# Patient Record
Sex: FEMALE | Race: WHITE | NOT HISPANIC OR LATINO | Employment: OTHER | URBAN - METROPOLITAN AREA
[De-identification: names, ages, dates, MRNs, and addresses within clinical notes are randomized per-mention and may not be internally consistent; named-entity substitution may affect disease eponyms.]

---

## 2017-01-03 ENCOUNTER — OFFICE VISIT (OUTPATIENT)
Dept: FAMILY MEDICINE | Facility: CLINIC | Age: 57
End: 2017-01-03
Payer: COMMERCIAL

## 2017-01-03 VITALS
HEIGHT: 61 IN | DIASTOLIC BLOOD PRESSURE: 78 MMHG | HEART RATE: 88 BPM | SYSTOLIC BLOOD PRESSURE: 128 MMHG | WEIGHT: 134 LBS | TEMPERATURE: 98.5 F | BODY MASS INDEX: 25.3 KG/M2

## 2017-01-03 DIAGNOSIS — Z11.59 NEED FOR HEPATITIS C SCREENING TEST: ICD-10-CM

## 2017-01-03 DIAGNOSIS — F32.5 MAJOR DEPRESSION IN COMPLETE REMISSION (H): Primary | ICD-10-CM

## 2017-01-03 DIAGNOSIS — Z23 NEED FOR PROPHYLACTIC VACCINATION AND INOCULATION AGAINST INFLUENZA: ICD-10-CM

## 2017-01-03 PROCEDURE — 99213 OFFICE O/P EST LOW 20 MIN: CPT | Performed by: FAMILY MEDICINE

## 2017-01-03 RX ORDER — BUPROPION HYDROCHLORIDE 300 MG/1
300 TABLET ORAL EVERY MORNING
Qty: 90 TABLET | Refills: 3 | Status: SHIPPED | OUTPATIENT
Start: 2017-01-03 | End: 2017-04-06

## 2017-01-03 ASSESSMENT — ANXIETY QUESTIONNAIRES
2. NOT BEING ABLE TO STOP OR CONTROL WORRYING: NOT AT ALL
5. BEING SO RESTLESS THAT IT IS HARD TO SIT STILL: NOT AT ALL
1. FEELING NERVOUS, ANXIOUS, OR ON EDGE: SEVERAL DAYS
7. FEELING AFRAID AS IF SOMETHING AWFUL MIGHT HAPPEN: NOT AT ALL
3. WORRYING TOO MUCH ABOUT DIFFERENT THINGS: SEVERAL DAYS
6. BECOMING EASILY ANNOYED OR IRRITABLE: NOT AT ALL
GAD7 TOTAL SCORE: 2

## 2017-01-03 ASSESSMENT — PATIENT HEALTH QUESTIONNAIRE - PHQ9: 5. POOR APPETITE OR OVEREATING: NOT AT ALL

## 2017-01-03 NOTE — MR AVS SNAPSHOT
After Visit Summary   1/3/2017    Lyn Santana    MRN: [id number]           Patient Information     Date Of Birth          1960        Visit Information        Provider Department      1/3/2017 1:00 PM Ez Corbett MD [facility]        Today's Diagnoses     Major depression in complete remission (H)    -  1     Need for hepatitis C screening test         Need for prophylactic vaccination and inoculation against influenza           Care Instructions    Orders Placed This Encounter     buPROPion (WELLBUTRIN XL) 300 MG 24 hr tablet     Sig: Take 1 tablet (300 mg) by mouth every morning     Dispense:  90 tablet     Refill:  3             Follow-ups after your visit        Who to contact     If you have questions or need follow up information about today's clinic visit or your schedule please contact [facility] directly at [phone].  Normal or non-critical lab and imaging results will be communicated to you by [facility], letter or phone within 4 business days after the clinic has received the results. If you do not hear from us within 7 days, please contact the clinic through [facility] [employer] or phone. If you have a critical or abnormal lab result, we will notify you by phone as soon as possible.  Submit refill requests through [employer] or call your pharmacy and they will forward the refill request to us. Please allow 3 business days for your refill to be completed.          Additional Information About Your Visit        MyChart Information     [employer] gives you secure access to your electronic health record. If you see a primary care provider, you can also send messages to your care team and make appointments. If you have questions, please call your primary care clinic.  If you do not have a primary care provider, please call [phone] and they will assist you.        Care EveryWhere ID     This is your Care EveryWhere ID. This could be used by other  "organizations to access your [facility] medical records  [id number]        Your Vitals Were     Pulse Temperature Height BMI (Body Mass Index)          88 98.5  F (36.9  C) (Oral) 5' 0.5\" (1.537 m) 25.73 kg/m2         Blood Pressure from Last 3 Encounters:   01/03/17 128/78   01/13/16 112/68   01/12/15 112/72    Weight from Last 3 Encounters:   01/03/17 134 lb (60.782 kg)   01/13/16 133 lb 8 oz (60.555 kg)   01/12/15 103 lb 12.8 oz (47.083 kg)              We Performed the Following     DEPRESSION ACTION PLAN (DAP) Order [[id number]]          Where to get your medicines      These medications were sent to [id number] - [place], [place] - [address] AT [facility] [address]  [address], [place] [address]     Phone:  [phone]    - buPROPion 300 MG 24 hr tablet       Primary Care Provider Office Phone # Fax #    Ez Corbett [phone]       [address] [address]        Thank you!     Thank you for choosing [facility]  for your care. Our goal is always to provide you with excellent care. Hearing back from our patients is one way we can continue to improve our services. Please take a few minutes to complete the written survey that you may receive in the mail after your visit with us. Thank you!             Your Updated Medication List - Protect others around you: Learn how to safely use, store and throw away your medicines at www.disposemymeds.org.          This list is accurate as of: 1/3/17  1:16 PM.  Always use your most recent med list.                   Brand Name Dispense Instructions for use    buPROPion 300 MG 24 hr tablet    WELLBUTRIN XL    90 tablet    Take 1 tablet (300 mg) by mouth every morning       CALCIUM + D PO      Take by mouth daily       MULTIVITAMINS PO      Take by mouth daily       vitamin D 2000 UNITS tablet     100 tablet    Take 2,000 Units by " mouth 2 times daily

## 2017-01-03 NOTE — PATIENT INSTRUCTIONS
Orders Placed This Encounter     buPROPion (WELLBUTRIN XL) 300 MG 24 hr tablet     Sig: Take 1 tablet (300 mg) by mouth every morning     Dispense:  90 tablet     Refill:  3

## 2017-01-03 NOTE — PROGRESS NOTES
SUBJECTIVE:                                                    Lyn Santana is a 56 year old female who presents to clinic today for the following health issues:    Patient reports eustress and distress secondary to grandson moving in, but otherwise doing well. Tolerating medications with no side effects. PHQ-9 was 2 and LEONOR-7 was 0.     Hep C screening recommendations and risk factors discussed with patient.     Mammogram on 8/1/2016 showed scattered fibroglandular densities. Recommend yearly mammograms.     Reviewed records from Colonoscopy in 2008. Normal findings and recommend repeat in 2018.     Past/recent records reviewed and discussed for -- immunizations (flu and shingles vaccine) and medications.        Depression and Anxiety Follow-Up    Status since last visit: No change    Other associated symptoms:None    Complicating factors:     Significant life event: Yes-  Son and grandson moved in     Current substance abuse: None    PHQ-9 SCORE 12/23/2014 2/19/2015 1/13/2016   Total Score 13 6 -   Total Score - - 0     LEONOR-7 SCORE 6/17/2014 10/24/2014 1/13/2016   Total Score 9 11 -   Total Score - - 0        PHQ-9  English      PHQ-9   Any Language     GAD7       Amount of exercise or physical activity: 3 days a week    Problems taking medications regularly: No    Medication side effects: none    Diet: regular (no restrictions)        Problem list and histories reviewed & adjusted, as indicated.  Additional history: as documented    Problem list, Medication list, Allergies, and Medical/Social/Surgical histories reviewed in EPIC and updated as appropriate.    ROS:  Constitutional, HEENT, cardiovascular, pulmonary, GI, , musculoskeletal, neuro, skin, endocrine and psych systems are negative, except as otherwise noted.    This document serves as a record of the services and decisions personally performed and made by Taqueria Corbett MD. It was created on their behalf by Leonid Zapata, a trained medical scribe. The  "creation of this document is based the provider's statements to the medical scribe.  Leonid Zapata January 3, 2017 1:02 PM         OBJECTIVE:                                                    /78 mmHg  Pulse 88  Temp(Src) 98.5  F (36.9  C) (Oral)  Ht 1.537 m (5' 0.5\")  Wt 60.782 kg (134 lb)  BMI 25.73 kg/m2  Body mass index is 25.73 kg/(m^2).  GENERAL: healthy, alert and no distress  HENT: ear canals and TM's normal, nose and mouth without ulcers or lesions  RESP: lungs clear to auscultation - no rales, rhonchi or wheezes  CV: regular rate and rhythm, normal S1 S2, no S3 or S4, no murmur, click or rub, no peripheral edema  PSYCH: mentation appears normal, affect normal/bright    Diagnostic Test Results:  none      ASSESSMENT/PLAN:                                                    (F32.5) Major depression in complete remission (H)  (primary encounter diagnosis)  Comment: stable  Plan: buPROPion (WELLBUTRIN XL) 300 MG 24 hr tablet        Continue present medications, medications refilled    (Z11.59) Need for hepatitis C screening test  Comment: risk factors reviewed, patient at low risk  Plan:     (Z23) Need for prophylactic vaccination and inoculation against influenza  Comment: patient given flu vaccine 8/2016  Plan:     Patient Instructions     Orders Placed This Encounter     buPROPion (WELLBUTRIN XL) 300 MG 24 hr tablet     Sig: Take 1 tablet (300 mg) by mouth every morning     Dispense:  90 tablet     Refill:  3         Ez Corbett MD  [facility]    "

## 2017-01-03 NOTE — NURSING NOTE
"Chief Complaint   Patient presents with     Depression       Initial /78 mmHg  Pulse 88  Temp(Src) 98.5  F (36.9  C) (Oral)  Ht 5' 0.5\" (1.537 m)  Wt 134 lb (60.782 kg)  BMI 25.73 kg/m2 Estimated body mass index is 25.73 kg/(m^2) as calculated from the following:    Height as of this encounter: 5' 0.5\" (1.537 m).    Weight as of this encounter: 134 lb (60.782 kg).  BP completed using cuff size: regular    Brigette Carver MA     "

## 2017-01-04 ASSESSMENT — ANXIETY QUESTIONNAIRES: GAD7 TOTAL SCORE: 2

## 2017-01-04 ASSESSMENT — PATIENT HEALTH QUESTIONNAIRE - PHQ9: SUM OF ALL RESPONSES TO PHQ QUESTIONS 1-9: 2

## 2017-03-01 ENCOUNTER — TELEPHONE (OUTPATIENT)
Dept: FAMILY MEDICINE | Facility: CLINIC | Age: 57
End: 2017-03-01

## 2017-03-01 NOTE — TELEPHONE ENCOUNTER
Reason for call:  Patient reporting a symptom    Symptom or request: Patient has not been sleeping for several weeks and has been experiencing a lot of anxiety lately. Patient would like to speak with someone from Dr Corbett's team.    Duration (how long have symptoms been present): 4 weeks of sleeping troubles, anxiety is reoccurring    Have you been treated for this before? Dr Corbett is aware of anxiety issues    Additional comments: Patient uses [employer] [address] in [place]    Phone Number patient can be reached at:  Home number on file [phone] (home)    Best Time:  any    Can we leave a detailed message on this number:  YES    Call taken on 3/1/2017 at 9:03 AM by Leighann Multani

## 2017-03-01 NOTE — TELEPHONE ENCOUNTER
"\"Certainly not in top form\".     Lyn Santana is a 56 year old female who calls with anxiety, trouble sleeping.    NURSING ASSESSMENT:  Description:  See below. Patient states \"I'm certainly not in top form, I think my wellbutrin may have stopped working.\" She denies hallucinations, paranoia, confusion, suicidal thoughts, palpitations, inability to function, extreme anxiety, hyperventilation, profuse sweating, persistent upset stomach, light-headedness, drug/alcohol abuse, recent abrupt cessation of medication/drugs/alcohol/caffiene  Onset/duration:  4 weeks  Precip. factors:  Pre-existing anxiety  Associated symptoms:  insomnia  Improves/worsens symptoms:  n/a  Allergies: No Known Allergies    MEDICATIONS:   Taking medication(s) as prescribed? Yes  Taking over the counter medication(s?) No  Any medication side effects? Not Applicable    Any barriers to taking medication(s) as prescribed?  No  Medication(s) improving/managing symptoms?  No  Medication reconciliation completed: No      NURSING PLAN: Nursing advice to patient schedule appointment to discuss medication changes    RECOMMENDED DISPOSITION:  See in 24 hours - appointment made  Will comply with recommendation: Yes  If further questions/concerns or if symptoms do not improve, worsen or new symptoms develop, call your PCP or [facility] as soon as possible.      Guideline used:  Telephone Triage Protocols for Nurses, Fifth Edition, Stephanie Mcgarry \"anxiety\"    Chapo Wall RN      "

## 2017-03-02 ENCOUNTER — OFFICE VISIT (OUTPATIENT)
Dept: FAMILY MEDICINE | Facility: CLINIC | Age: 57
End: 2017-03-02
Payer: COMMERCIAL

## 2017-03-02 VITALS
TEMPERATURE: 97.6 F | DIASTOLIC BLOOD PRESSURE: 78 MMHG | SYSTOLIC BLOOD PRESSURE: 124 MMHG | HEART RATE: 64 BPM | HEIGHT: 61 IN | BODY MASS INDEX: 23.79 KG/M2 | WEIGHT: 126 LBS

## 2017-03-02 DIAGNOSIS — F51.01 PRIMARY INSOMNIA: ICD-10-CM

## 2017-03-02 DIAGNOSIS — F32.0 MAJOR DEPRESSIVE DISORDER, SINGLE EPISODE, MILD (H): Primary | ICD-10-CM

## 2017-03-02 PROCEDURE — 99214 OFFICE O/P EST MOD 30 MIN: CPT | Performed by: FAMILY MEDICINE

## 2017-03-02 RX ORDER — BUSPIRONE HYDROCHLORIDE 5 MG/1
TABLET ORAL
Qty: 120 TABLET | Refills: 3 | Status: SHIPPED
Start: 2017-03-02 | End: 2017-12-12

## 2017-03-02 RX ORDER — TRAZODONE HYDROCHLORIDE 50 MG/1
50 TABLET, FILM COATED ORAL
Qty: 60 TABLET | Refills: 5 | Status: SHIPPED | OUTPATIENT
Start: 2017-03-02 | End: 2018-01-26

## 2017-03-02 ASSESSMENT — ANXIETY QUESTIONNAIRES
3. WORRYING TOO MUCH ABOUT DIFFERENT THINGS: MORE THAN HALF THE DAYS
2. NOT BEING ABLE TO STOP OR CONTROL WORRYING: MORE THAN HALF THE DAYS
5. BEING SO RESTLESS THAT IT IS HARD TO SIT STILL: SEVERAL DAYS
7. FEELING AFRAID AS IF SOMETHING AWFUL MIGHT HAPPEN: NOT AT ALL
6. BECOMING EASILY ANNOYED OR IRRITABLE: NOT AT ALL
GAD7 TOTAL SCORE: 10
1. FEELING NERVOUS, ANXIOUS, OR ON EDGE: NEARLY EVERY DAY

## 2017-03-02 ASSESSMENT — PATIENT HEALTH QUESTIONNAIRE - PHQ9: 5. POOR APPETITE OR OVEREATING: MORE THAN HALF THE DAYS

## 2017-03-02 NOTE — Clinical Note
Tony  Review this patient with me. She would benefit from seeing you again. Hopefully she will call and set up an appt.  Taqueria Corbett MD

## 2017-03-02 NOTE — PROGRESS NOTES
SUBJECTIVE:                                                    Lyn Santana is a 56 year old female who presents to clinic today for the following health issues:    Anxiety. Mrs Santana reports increased anxiety secondary to her son and grandson recently moving-in. She states that changes in routine and responsibility has made it hard to find time for herself, and that it is hard for her to express her emotions as she tends to internalize her thoughts. Also, notes that she gets extremely anxious and has difficulty problem solving around things at work. Most bothersome symptoms include difficulty falling asleep at night and waking up early in the morning. Taking trazodone does seem to help provide some mental clarity. Discussed doing cognitive therapy and adding Buspar to her medications.     Subjective:  PHQ-9 score was 13 and LEONOR-7 score was 10      Past/recent records reviewed and discussed for -- medications            Depression and Anxiety Follow-Up    Status since last visit: Worsened secondary to situational stressors    Other associated symptoms:insomnia    Complicating factors:     Significant life event: No     Current substance abuse: None    PHQ-9 SCORE 2/19/2015 1/13/2016 1/3/2017   Total Score 6 - -   Total Score - 0 2     LEONOR-7 SCORE 10/24/2014 1/13/2016 1/3/2017   Total Score 11 - -   Total Score - 0 2        PHQ-9  English      PHQ-9   Any Language     GAD7       Amount of exercise or physical activity: usually 3 days a week    Problems taking medications regularly: No    Medication side effects: trouble sleeping  Diet: regular (no restrictions)        Problem list and histories reviewed & adjusted, as indicated.  Additional history: as documented    Labs reviewed in EPIC    Reviewed and updated as needed this visit by clinical staff       Reviewed and updated as needed this visit by Provider         ROS:  Constitutional, HEENT, cardiovascular, pulmonary, GI, , musculoskeletal, neuro, skin,  "endocrine and psych systems are negative, except as otherwise noted.    This document serves as a record of the services and decisions personally performed and made by Taqueria Corbett MD. It was created on their behalf by Leonid Zapata, a trained medical scribe. The creation of this document is based the provider's statements to the medical scribe.  Leonid Zapata March 2, 2017 5:37 PM         OBJECTIVE:                                                    /78 (BP Location: Right arm, Cuff Size: Adult Regular)  Pulse 64  Temp 97.6  F (36.4  C) (Oral)  Ht 1.537 m (5' 0.5\")  Wt 57.2 kg (126 lb)  BMI 24.2 kg/m2  Body mass index is 24.2 kg/(m^2).  GENERAL: healthy, alert and no distress  RESP: lungs clear to auscultation - no rales, rhonchi or wheezes  CV: regular rate and rhythm, normal S1 S2, no S3 or S4, no murmur, click or rub, no peripheral edema   PSYCH: mentation appears normal, flat affect    Diagnostic Test Results:  none      ASSESSMENT/PLAN:                                                    (F32.0) Major depressive disorder, single episode, mild (H)  (primary encounter diagnosis)  Comment: increased anxiety secondary to situational stressors.   Plan: busPIRone (BUSPAR) 5 MG tablet        Communicate with Tony and start on Buspar       Call patient in 1 week to reassess symptoms    (F51.01) Primary insomnia  Comment: Information provided to help improve her sleep hygiene   Plan: traZODone (DESYREL) 50 MG tablet           Patient Instructions   -avoid staying in bed when you're having difficulty sleeping at night.     Try valerian root, lavender oil, or tryptophan    -Create and maintain a routine    -Try to make time (30-60 minutes) for some light physical activity (example: brisk walk)     -keeping diary could help you avoid internalizing your thoughts and feelings.     -you can take up to two tablets of trazdone at bedtime     Tony Lucas [phone]- [place] [facility]    General recommendations for sleep " problems (Insomnia)  Allow 2-4 weeks to see results   Establish a regular sleep schedule   Go to bed at same time each night   Get up at same time each day   Avoid sleeping-in on Sunday morning   Cut down time in bed (if not asleep, get up)   Avoid trying to force yourself to sleep   Use your bed only for sleep and sex   Do not read or watch Television in bed   Make the bedroom comfortable   Keep temperature in your bedroom comfortable   Keep bedroom quiet when sleeping   Consider ear plugs (silicon)   Keep bedroom dark enough   Use dark blinds or wear an eye mask if needed   Relax at bedtime   Relax each muscle group individually   Begin with your feet   Work toward your head   Deal with your worries before bedtime   Set aside a worry time for 30 minutes earlier   Listen to relaxation tapes   Classical Music or Nature sounds   Imagine a tranquil scene (e.g. waterfall or beach)   Back Massage   Gentle 5-minute back rub prior to bedtime   Perform measures to make you tired at bedtime   Get regular Exercise each day (6 hours before bedtime)   Take medications only as directed   Eat a light bedtime snack or warm drink   Warm milk   Warm herbal tea (non-caffeinated)   Special Therapy Measures   Sleep Restriction Therapy   Limit time in bed for 15 minutes more than sleep   Do not set limit under 4 hours   Increase time by 15 minutes per effective sleep trial   Effective sleep suggests >90% of bedtime asleep   Stimulus Control   Do not lie awake for more than 30 minutes   Get out of bed and perform a quiet activity   Return to bed when sleepy   Repeat as many times per night as needed   No Napping during day   Things to avoid   Do not Exercise just before bedtime   No overstimulating activities just before bed   No competitive games before bedtime   No exciting Television programs before bedtime   Avoid caffeine after lunchtime   Avoid chocolate   Avoid coffee, tea, or soda   Do not use alcohol to induce sleep (worsens  Insomnia)   Do not take someone else's sleeping pills   Do not look at the clock when awakening   Do not turn on light when getting up to use bathroom   Read the book Say God Night To Insomnia            Ez Corbett MD  [facility]

## 2017-03-02 NOTE — NURSING NOTE
"Chief Complaint   Patient presents with     Anxiety       Initial /78 (BP Location: Right arm, Cuff Size: Adult Regular)  Pulse 64  Temp 97.6  F (36.4  C) (Oral)  Ht 5' 0.5\" (1.537 m)  Wt 126 lb (57.2 kg)  BMI 24.2 kg/m2 Estimated body mass index is 24.2 kg/(m^2) as calculated from the following:    Height as of this encounter: 5' 0.5\" (1.537 m).    Weight as of this encounter: 126 lb (57.2 kg).  Medication Reconciliation: complete     Brigette Carver MA     "

## 2017-03-02 NOTE — PATIENT INSTRUCTIONS
-avoid staying in bed when you're having difficulty sleeping at night.     Try valerian root, lavender oil, or tryptophan    -Create and maintain a routine    -Try to make time (30-60 minutes) for some light physical activity (example: brisk walk)     -keeping diary could help you avoid internalizing your thoughts and feelings.     -you can take up to two tablets of trazdone at bedtime     Tony Lucas [phone]- [facility]    General recommendations for sleep problems (Insomnia)  Allow 2-4 weeks to see results   Establish a regular sleep schedule   Go to bed at same time each night   Get up at same time each day   Avoid sleeping-in on Sunday morning   Cut down time in bed (if not asleep, get up)   Avoid trying to force yourself to sleep   Use your bed only for sleep and sex   Do not read or watch Television in bed   Make the bedroom comfortable   Keep temperature in your bedroom comfortable   Keep bedroom quiet when sleeping   Consider ear plugs (silicon)   Keep bedroom dark enough   Use dark blinds or wear an eye mask if needed   Relax at bedtime   Relax each muscle group individually   Begin with your feet   Work toward your head   Deal with your worries before bedtime   Set aside a worry time for 30 minutes earlier   Listen to relaxation tapes   Classical Music or Nature sounds   Imagine a tranquil scene (e.g. waterfall or beach)   Back Massage   Gentle 5-minute back rub prior to bedtime   Perform measures to make you tired at bedtime   Get regular Exercise each day (6 hours before bedtime)   Take medications only as directed   Eat a light bedtime snack or warm drink   Warm milk   Warm herbal tea (non-caffeinated)   Special Therapy Measures   Sleep Restriction Therapy   Limit time in bed for 15 minutes more than sleep   Do not set limit under 4 hours   Increase time by 15 minutes per effective sleep trial   Effective sleep suggests >90% of bedtime asleep   Stimulus Control   Do not lie awake for  more than 30 minutes   Get out of bed and perform a quiet activity   Return to bed when sleepy   Repeat as many times per night as needed   No Napping during day   Things to avoid   Do not Exercise just before bedtime   No overstimulating activities just before bed   No competitive games before bedtime   No exciting Television programs before bedtime   Avoid caffeine after lunchtime   Avoid chocolate   Avoid coffee, tea, or soda   Do not use alcohol to induce sleep (worsens Insomnia)   Do not take someone else's sleeping pills   Do not look at the clock when awakening   Do not turn on light when getting up to use bathroom   Read the book Say God Night To Insomnia

## 2017-03-02 NOTE — MR AVS SNAPSHOT
After Visit Summary   3/2/2017    Lyn Santana    MRN: [id number]           Patient Information     Date Of Birth          1960        Visit Information        Provider Department      3/2/2017 5:40 PM Ez Corbett MD [facility]        Today's Diagnoses     Major depressive disorder, single episode, mild (H)    -  1    Primary insomnia          Care Instructions    -avoid staying in bed when you're having difficulty sleeping at night.     Try valerian root, lavender oil, or tryptophan    -Create and maintain a routine    -Try to make time (30-60 minutes) for some light physical activity (example: brisk walk)     -keeping diary could help you avoid internalizing your thoughts and feelings.     -you can take up to two tablets of trazdone at bedtime     Tony Lucas [phone]- [place] Scheduling    General recommendations for sleep problems (Insomnia)  Allow 2-4 weeks to see results   Establish a regular sleep schedule   Go to bed at same time each night   Get up at same time each day   Avoid sleeping-in on Sunday morning   Cut down time in bed (if not asleep, get up)   Avoid trying to force yourself to sleep   Use your bed only for sleep and sex   Do not read or watch Television in bed   Make the bedroom comfortable   Keep temperature in your bedroom comfortable   Keep bedroom quiet when sleeping   Consider ear plugs (silicon)   Keep bedroom dark enough   Use dark blinds or wear an eye mask if needed   Relax at bedtime   Relax each muscle group individually   Begin with your feet   Work toward your head   Deal with your worries before bedtime   Set aside a worry time for 30 minutes earlier   Listen to relaxation tapes   Classical Music or Nature sounds   Imagine a tranquil scene (e.g. waterfall or beach)   Back Massage   Gentle 5-minute back rub prior to bedtime   Perform measures to make you tired at bedtime   Get regular Exercise each day (6 hours before  bedtime)   Take medications only as directed   Eat a light bedtime snack or warm drink   Warm milk   Warm herbal tea (non-caffeinated)   Special Therapy Measures   Sleep Restriction Therapy   Limit time in bed for 15 minutes more than sleep   Do not set limit under 4 hours   Increase time by 15 minutes per effective sleep trial   Effective sleep suggests >90% of bedtime asleep   Stimulus Control   Do not lie awake for more than 30 minutes   Get out of bed and perform a quiet activity   Return to bed when sleepy   Repeat as many times per night as needed   No Napping during day   Things to avoid   Do not Exercise just before bedtime   No overstimulating activities just before bed   No competitive games before bedtime   No exciting Television programs before bedtime   Avoid caffeine after lunchtime   Avoid chocolate   Avoid coffee, tea, or soda   Do not use alcohol to induce sleep (worsens Insomnia)   Do not take someone else's sleeping pills   Do not look at the clock when awakening   Do not turn on light when getting up to use bathroom   Read the book Say God Night To Insomnia                  Follow-ups after your visit        Who to contact     If you have questions or need follow up information about today's clinic visit or your schedule please contact [facility] directly at [phone].  Normal or non-critical lab and imaging results will be communicated to you by MyChart, letter or phone within 4 business days after the clinic has received the results. If you do not hear from us within 7 days, please contact the clinic through "[employer]hart or phone. If you have a critical or abnormal lab result, we will notify you by phone as soon as possible.  Submit refill requests through [employer] or call your pharmacy and they will forward the refill request to us. Please allow 3 business days for your refill to be completed.          Additional Information About Your Visit        MyChart Information      "[employer] gives you secure access to your electronic health record. If you see a primary care provider, you can also send messages to your care team and make appointments. If you have questions, please call your primary care clinic.  If you do not have a primary care provider, please call [phone] and they will assist you.        Care EveryWhere ID     This is your Care EveryWhere ID. This could be used by other organizations to access your [facility] medical records  [id number]        Your Vitals Were     Pulse Temperature Height BMI (Body Mass Index)          64 97.6  F (36.4  C) (Oral) 5' 0.5\" (1.537 m) 24.2 kg/m2         Blood Pressure from Last 3 Encounters:   03/02/17 124/78   01/03/17 128/78   01/13/16 112/68    Weight from Last 3 Encounters:   03/02/17 126 lb (57.2 kg)   01/03/17 134 lb (60.8 kg)   01/13/16 133 lb 8 oz (60.6 kg)              Today, you had the following     No orders found for display         Today's Medication Changes          These changes are accurate as of: 3/2/17  6:04 PM.  If you have any questions, ask your nurse or doctor.               Start taking these medicines.        Dose/Directions    busPIRone 5 MG tablet   Commonly known as:  BUSPAR   Used for:  Major depressive disorder, single episode, mild (H)   Started by:  Ez Corbett MD        Start at 5 mg twice daily for 3 days, , then 10 mg (2 tabs) twice daily for 3 days, then 12.5 mg (2.5 tabs) twice daily for 3 days, then 15 mg (3 tabs) twice daily and stay at that dose   Quantity:  120 tablet   Refills:  3         These medicines have changed or have updated prescriptions.        Dose/Directions    * TRAZODONE HCL PO   This may have changed:  Another medication with the same name was added. Make sure you understand how and when to take each.   Changed by:  Ez Corbett MD        Refills:  0       * traZODone 50 MG tablet   Commonly known as:  DESYREL   This may have changed:  You were already taking a " medication with the same name, and this prescription was added. Make sure you understand how and when to take each.   Used for:  Primary insomnia   Changed by:  Ez Corbett MD        Dose:  50 mg   Take 1 tablet (50 mg) by mouth nightly as needed for sleep   Quantity:  60 tablet   Refills:  5       * Notice:  This list has 2 medication(s) that are the same as other medications prescribed for you. Read the directions carefully, and ask your doctor or other care provider to review them with you.         Where to get your medicines      These medications were sent to [id number] - [place], [place] - [address] AT [facility] [address]  [address], [place] [address]     Phone:  [phone]     busPIRone 5 MG tablet    traZODone 50 MG tablet                Primary Care Provider Office Phone # Fax #    Ez Corbett [phone]       [address] [address]        Thank you!     Thank you for choosing [facility]  for your care. Our goal is always to provide you with excellent care. Hearing back from our patients is one way we can continue to improve our services. Please take a few minutes to complete the written survey that you may receive in the mail after your visit with us. Thank you!             Your Updated Medication List - Protect others around you: Learn how to safely use, store and throw away your medicines at www.disposemymeds.org.          This list is accurate as of: 3/2/17  6:04 PM.  Always use your most recent med list.                   Brand Name Dispense Instructions for use    buPROPion 300 MG 24 hr tablet    WELLBUTRIN XL    90 tablet    Take 1 tablet (300 mg) by mouth every morning       busPIRone 5 MG tablet    BUSPAR    120 tablet    Start at 5 mg twice daily for 3 days, , then 10 mg (2 tabs) twice daily for 3 days, then 12.5 mg (2.5 tabs) twice  daily for 3 days, then 15 mg (3 tabs) twice daily and stay at that dose       CALCIUM + D PO      Take by mouth daily       MULTIVITAMINS PO      Take by mouth daily       * TRAZODONE HCL PO          * traZODone 50 MG tablet    DESYREL    60 tablet    Take 1 tablet (50 mg) by mouth nightly as needed for sleep       vitamin D 2000 UNITS tablet     100 tablet    Take 2,000 Units by mouth 2 times daily       * Notice:  This list has 2 medication(s) that are the same as other medications prescribed for you. Read the directions carefully, and ask your doctor or other care provider to review them with you.

## 2017-03-03 ASSESSMENT — ANXIETY QUESTIONNAIRES: GAD7 TOTAL SCORE: 10

## 2017-03-03 ASSESSMENT — PATIENT HEALTH QUESTIONNAIRE - PHQ9: SUM OF ALL RESPONSES TO PHQ QUESTIONS 1-9: 13

## 2017-04-03 ENCOUNTER — VIRTUAL VISIT (OUTPATIENT)
Dept: FAMILY MEDICINE | Facility: CLINIC | Age: 57
End: 2017-04-03
Payer: COMMERCIAL

## 2017-04-03 DIAGNOSIS — F33.0 MAJOR DEPRESSIVE DISORDER, RECURRENT EPISODE, MILD (H): ICD-10-CM

## 2017-04-03 DIAGNOSIS — F41.9 ANXIETY: ICD-10-CM

## 2017-04-03 PROCEDURE — 99441 ZZC PHYSICIAN TELEPHONE EVALUATION 5-10 MIN: CPT | Performed by: FAMILY MEDICINE

## 2017-04-03 RX ORDER — BUPROPION HYDROCHLORIDE 150 MG/1
150 TABLET, EXTENDED RELEASE ORAL 2 TIMES DAILY
Qty: 14 TABLET | Refills: 0 | Status: SHIPPED | OUTPATIENT
Start: 2017-04-03 | End: 2017-04-03 | Stop reason: DRUGHIGH

## 2017-04-03 RX ORDER — SERTRALINE HYDROCHLORIDE 25 MG/1
25 TABLET, FILM COATED ORAL DAILY
Qty: 90 TABLET | Refills: 3 | Status: SHIPPED | OUTPATIENT
Start: 2017-04-03 | End: 2017-06-13

## 2017-04-03 ASSESSMENT — ANXIETY QUESTIONNAIRES
GAD7 TOTAL SCORE: 15
5. BEING SO RESTLESS THAT IT IS HARD TO SIT STILL: MORE THAN HALF THE DAYS
3. WORRYING TOO MUCH ABOUT DIFFERENT THINGS: NEARLY EVERY DAY
2. NOT BEING ABLE TO STOP OR CONTROL WORRYING: NEARLY EVERY DAY
7. FEELING AFRAID AS IF SOMETHING AWFUL MIGHT HAPPEN: NOT AT ALL
6. BECOMING EASILY ANNOYED OR IRRITABLE: SEVERAL DAYS
1. FEELING NERVOUS, ANXIOUS, OR ON EDGE: NEARLY EVERY DAY

## 2017-04-03 ASSESSMENT — PATIENT HEALTH QUESTIONNAIRE - PHQ9: 5. POOR APPETITE OR OVEREATING: NEARLY EVERY DAY

## 2017-04-03 NOTE — Clinical Note
Call patient and make a follow up appointment in 3-4 weeks. It would be best to see her but if she can only do a phone visit that is ok  Taqueria Corbett MD

## 2017-04-03 NOTE — MR AVS SNAPSHOT
After Visit Summary   4/3/2017    Lyn Santana    MRN: [id number]           Patient Information     Date Of Birth          1960        Visit Information        Provider Department      4/3/2017 10:00 AM Ez Corbett MD [facility]        Today's Diagnoses     Anxiety        Major depressive disorder, recurrent episode, mild (H)           Follow-ups after your visit        Your next 10 appointments already scheduled     Apr 24, 2017  8:40 AM CDT   Office Visit with Ez Corbett MD   [facility] ([facility]    [address] [place] [id number] [phone]           Bring a current list of meds and any records pertaining to this visit.  For Physicals, please bring immunization records and any forms needing to be filled out.  Please arrive 10 minutes early to complete paperwork.            May 08, 2017  9:30 AM CDT   New Visit with [facility] Tr[facility] ([address] [id number] [phone]              Who to contact     If you have questions or need follow up information about today's clinic visit or your schedule please contact [facility] directly at [phone].  Normal or non-critical lab and imaging results will be communicated to you by MyChart, letter or phone within 4 business days after the clinic has received the results. If you do not hear from us within 7 days, please contact the clinic through MyChart or phone. If you have a critical or abnormal lab result, we will notify you by phone as soon as possible.  Submit refill requests through [employer] or call your pharmacy and they will forward the refill request to us. Please allow 3 business days for your refill to be completed.          Additional Information About Your Visit        MyChart Information     [employer] gives you  secure access to your electronic health record. If you see a primary care provider, you can also send messages to your care team and make appointments. If you have questions, please call your primary care clinic.  If you do not have a primary care provider, please call [phone] and they will assist you.        Care EveryWhere ID     This is your Care EveryWhere ID. This could be used by other organizations to access your [facility] medical records  [id number]         Blood Pressure from Last 3 Encounters:   03/02/17 124/78   01/03/17 128/78   01/13/16 112/68    Weight from Last 3 Encounters:   03/02/17 126 lb (57.2 kg)   01/03/17 134 lb (60.8 kg)   01/13/16 133 lb 8 oz (60.6 kg)              Today, you had the following     No orders found for display         Today's Medication Changes          These changes are accurate as of: 4/3/17  4:21 PM.  If you have any questions, ask your nurse or doctor.               Start taking these medicines.        Dose/Directions    sertraline 25 MG tablet   Commonly known as:  ZOLOFT   Used for:  Anxiety, Major depressive disorder, recurrent episode, mild (H)   Started by:  Ez Corbett MD        Dose:  25 mg   Take 1 tablet (25 mg) by mouth daily Start with 1/2 tablet per day for 3-4 weeks then increase to full tablet   Quantity:  90 tablet   Refills:  3         These medicines have changed or have updated prescriptions.        Dose/Directions    * buPROPion 300 MG 24 hr tablet   Commonly known as:  WELLBUTRIN XL   This may have changed:  Another medication with the same name was added. Make sure you understand how and when to take each.   Used for:  Major depression in complete remission (H)   Changed by:  Ez Corbett MD        Dose:  300 mg   Take 1 tablet (300 mg) by mouth every morning   Quantity:  90 tablet   Refills:  3       * buPROPion 150 MG 12 hr tablet   Commonly known as:  WELLBUTRIN SR   This may have changed:  You were already taking a  medication with the same name, and this prescription was added. Make sure you understand how and when to take each.   Used for:  Anxiety, Major depressive disorder, recurrent episode, mild (H)   Changed by:  Ez Corbett MD        Dose:  150 mg   Take 1 tablet (150 mg) by mouth 2 times daily Take 1 tablet per day for 1 week then 1/2 tablet pre day for 1 week then stop   Quantity:  14 tablet   Refills:  0       * Notice:  This list has 2 medication(s) that are the same as other medications prescribed for you. Read the directions carefully, and ask your doctor or other care provider to review them with you.         Where to get your medicines      These medications were sent to [id number] - [place], [place] - [address]  AT [facility] [address]  [facility] [address], [place] [address]     Phone:  [phone]     buPROPion 150 MG 12 hr tablet    sertraline 25 MG tablet                Primary Care Provider Office Phone # Fax #    Ez Corbett [phone]       [address] [address]        Thank you!     Thank you for choosing [facility]  for your care. Our goal is always to provide you with excellent care. Hearing back from our patients is one way we can continue to improve our services. Please take a few minutes to complete the written survey that you may receive in the mail after your visit with us. Thank you!             Your Updated Medication List - Protect others around you: Learn how to safely use, store and throw away your medicines at www.disposemymeds.org.          This list is accurate as of: 4/3/17  4:21 PM.  Always use your most recent med list.                   Brand Name Dispense Instructions for use    * buPROPion 300 MG 24 hr tablet    WELLBUTRIN XL    90 tablet    Take 1 tablet (300 mg) by mouth every morning       * buPROPion 150 MG 12 hr tablet     WELLBUTRIN SR    14 tablet    Take 1 tablet (150 mg) by mouth 2 times daily Take 1 tablet per day for 1 week then 1/2 tablet pre day for 1 week then stop       busPIRone 5 MG tablet    BUSPAR    120 tablet    Start at 5 mg twice daily for 3 days, , then 10 mg (2 tabs) twice daily for 3 days, then 12.5 mg (2.5 tabs) twice daily for 3 days, then 15 mg (3 tabs) twice daily and stay at that dose       CALCIUM + D PO      Take by mouth daily       MULTIVITAMINS PO      Take by mouth daily       sertraline 25 MG tablet    ZOLOFT    90 tablet    Take 1 tablet (25 mg) by mouth daily Start with 1/2 tablet per day for 3-4 weeks then increase to full tablet       traZODone 50 MG tablet    DESYREL    60 tablet    Take 1 tablet (50 mg) by mouth nightly as needed for sleep       vitamin D 2000 UNITS tablet     100 tablet    Take 2,000 Units by mouth 2 times daily       * Notice:  This list has 2 medication(s) that are the same as other medications prescribed for you. Read the directions carefully, and ask your doctor or other care provider to review them with you.

## 2017-04-03 NOTE — TELEPHONE ENCOUNTER
Reason for Call:  Other prescription    Detailed comments:  buPROPion (WELLBUTRIN SR) 150 MG 12 hr tablet, calling for clarification on directions, also pill is a coated pill and can not be split.    Phone Number Patient can be reached at: Other phone number:  Aydin, Pharmacist at [facility]: [phone]    Best Time: any    Can we leave a detailed message on this number? NO, please speak with pharmacy team    Call taken on 4/3/2017 at 11:05 AM by Gisselle Morfin

## 2017-04-03 NOTE — PROGRESS NOTES
Orders Placed This Encounter     sertraline (ZOLOFT) 25 MG tablet     Sig: Take 1 tablet (25 mg) by mouth daily Start with 1/2 tablet per day for 3-4 weeks then increase to full tablet     Dispense:  90 tablet     Refill:  3     buPROPion (WELLBUTRIN SR) 150 MG 12 hr tablet     Sig: Take 1 tablet (150 mg) by mouth 2 times daily Take 1 tablet per day for 1 week then 1/2 tablet pre day for 1 week then stop     Dispense:  14 tablet     Refill:  0

## 2017-04-03 NOTE — PROGRESS NOTES
"Lyn Santana is a 57 year old female who is being evaluated via a telephone visit.      The patient has been notified of following:     \"This telephone visit will be conducted via a call between you and your physician/provider. We have found that certain health care needs can be provided without the need for a physical exam.  This service lets us provide the care you need with a short phone conversation.  If a prescription is necessary we can send it directly to your pharmacy.  If lab work is needed we can place an order for that and you can then stop by our lab to have the test done at a later time.    We will bill your insurance company for this service.  Please check with your medical insurance if this type of visit is covered. You may be responsible for the cost of this type of visit if insurance coverage is denied.  The typical cost is $30 (10min), $59 (11-20min) and $85 (21-30min).  Most often these visits are shorter than 10 minutes.    If during the course of the call the physician/provider feels a telephone visit is not appropriate, you will not be charged for this service.\"       Consent has been obtained for this service by 1 care team member: yes. See the scanned image in the medical record.    Lyn Santana complains of  Anxiety      I have reviewed and updated the patient's Past Medical History, Social History, Family History and Medication List.    ALLERGIES  Review of patient's allergies indicates no known allergies.    Brigette Carver MA  (MA signature)    Additional provider notes:   Patient feels no improvement over pasts month . No suicidal concerns. Is isolating herself. Has appt next month with counselor.    Reviewed meds. Zoloft had worked in the past    After review plan will be to  1. Stop Buspar  2. Taper Wellbuttin  3. Restart Zoloft and increase over the next month    Recheck in 3-4 weeks.     Assessment/Plan:  No diagnosis found.    I have reviewed the note as documented above.  " This accurately captures the substance of my conversation with the patient,  Lyn Santana        Total time of call between patient and provider was 8 minutes

## 2017-04-04 ASSESSMENT — PATIENT HEALTH QUESTIONNAIRE - PHQ9: SUM OF ALL RESPONSES TO PHQ QUESTIONS 1-9: 19

## 2017-04-04 ASSESSMENT — ANXIETY QUESTIONNAIRES: GAD7 TOTAL SCORE: 15

## 2017-04-04 NOTE — TELEPHONE ENCOUNTER
After review she can take the 150 mg for 2 weeks then discontinue    Order pended. Please call pharmacy and notify them of change and ok to send RX that is pended.    Taqueria Corbett MD

## 2017-04-05 ENCOUNTER — DOCUMENTATION ONLY (OUTPATIENT)
Dept: LAB | Facility: CLINIC | Age: 57
End: 2017-04-05

## 2017-04-05 DIAGNOSIS — Z13.6 CARDIOVASCULAR SCREENING; LDL GOAL LESS THAN 130: Primary | ICD-10-CM

## 2017-04-05 DIAGNOSIS — F32.0 MAJOR DEPRESSIVE DISORDER, SINGLE EPISODE, MILD (H): ICD-10-CM

## 2017-04-05 DIAGNOSIS — Z13.1 SCREENING FOR DIABETES MELLITUS: ICD-10-CM

## 2017-04-05 NOTE — TELEPHONE ENCOUNTER
Pharmacy called to follow up on clarification of medication. Please call back, ok to leave message.    .Chapo Harvey  Patient Representative

## 2017-04-05 NOTE — PROGRESS NOTES
This patient has a future lab only appointment on 04/10/2017 and needs orders. Please sign the pended labs taken from the overdue  and make any needed changes. Thanks dejah

## 2017-04-06 RX ORDER — BUPROPION HYDROCHLORIDE 150 MG/1
TABLET ORAL
Qty: 14 TABLET | Refills: 1 | Status: SHIPPED | OUTPATIENT
Start: 2017-04-06 | End: 2017-04-24

## 2017-04-07 ENCOUNTER — MYC MEDICAL ADVICE (OUTPATIENT)
Dept: FAMILY MEDICINE | Facility: CLINIC | Age: 57
End: 2017-04-07

## 2017-04-10 DIAGNOSIS — Z13.6 CARDIOVASCULAR SCREENING; LDL GOAL LESS THAN 130: ICD-10-CM

## 2017-04-10 DIAGNOSIS — Z13.1 SCREENING FOR DIABETES MELLITUS: ICD-10-CM

## 2017-04-10 DIAGNOSIS — F32.0 MAJOR DEPRESSIVE DISORDER, SINGLE EPISODE, MILD (H): ICD-10-CM

## 2017-04-10 LAB
ANION GAP SERPL CALCULATED.3IONS-SCNC: 6 MMOL/L (ref 3–14)
BUN SERPL-MCNC: 23 MG/DL (ref 7–30)
CALCIUM SERPL-MCNC: 9.2 MG/DL (ref 8.5–10.1)
CHLORIDE SERPL-SCNC: 105 MMOL/L (ref 94–109)
CHOLEST SERPL-MCNC: 222 MG/DL
CO2 SERPL-SCNC: 29 MMOL/L (ref 20–32)
CREAT SERPL-MCNC: 0.86 MG/DL (ref 0.52–1.04)
GFR SERPL CREATININE-BSD FRML MDRD: 68 ML/MIN/1.7M2
GLUCOSE SERPL-MCNC: 91 MG/DL (ref 70–99)
HDLC SERPL-MCNC: 117 MG/DL
LDLC SERPL CALC-MCNC: 94 MG/DL
NONHDLC SERPL-MCNC: 105 MG/DL
POTASSIUM SERPL-SCNC: 4.3 MMOL/L (ref 3.4–5.3)
SODIUM SERPL-SCNC: 140 MMOL/L (ref 133–144)
TRIGL SERPL-MCNC: 53 MG/DL
TSH SERPL DL<=0.005 MIU/L-ACNC: 2.22 MU/L (ref 0.4–4)

## 2017-04-10 PROCEDURE — 80048 BASIC METABOLIC PNL TOTAL CA: CPT | Performed by: FAMILY MEDICINE

## 2017-04-10 PROCEDURE — 36415 COLL VENOUS BLD VENIPUNCTURE: CPT | Performed by: FAMILY MEDICINE

## 2017-04-10 PROCEDURE — 84443 ASSAY THYROID STIM HORMONE: CPT | Performed by: FAMILY MEDICINE

## 2017-04-10 PROCEDURE — 80061 LIPID PANEL: CPT | Performed by: FAMILY MEDICINE

## 2017-04-17 ENCOUNTER — MYC MEDICAL ADVICE (OUTPATIENT)
Dept: FAMILY MEDICINE | Facility: CLINIC | Age: 57
End: 2017-04-17

## 2017-04-18 NOTE — TELEPHONE ENCOUNTER
Will route to provider to clarify. This seems correct in tapering down the bupropion and then slowly increasing sertraline, but wanted to double check.    Chapo Wall RN

## 2017-04-24 ENCOUNTER — OFFICE VISIT (OUTPATIENT)
Dept: FAMILY MEDICINE | Facility: CLINIC | Age: 57
End: 2017-04-24
Payer: COMMERCIAL

## 2017-04-24 VITALS
HEIGHT: 61 IN | BODY MASS INDEX: 22.47 KG/M2 | HEART RATE: 80 BPM | DIASTOLIC BLOOD PRESSURE: 70 MMHG | SYSTOLIC BLOOD PRESSURE: 128 MMHG | WEIGHT: 119 LBS | TEMPERATURE: 98.1 F

## 2017-04-24 DIAGNOSIS — F41.9 ANXIETY: Primary | ICD-10-CM

## 2017-04-24 PROCEDURE — 99213 OFFICE O/P EST LOW 20 MIN: CPT | Performed by: FAMILY MEDICINE

## 2017-04-24 ASSESSMENT — PATIENT HEALTH QUESTIONNAIRE - PHQ9: 5. POOR APPETITE OR OVEREATING: MORE THAN HALF THE DAYS

## 2017-04-24 ASSESSMENT — ANXIETY QUESTIONNAIRES
GAD7 TOTAL SCORE: 14
6. BECOMING EASILY ANNOYED OR IRRITABLE: SEVERAL DAYS
2. NOT BEING ABLE TO STOP OR CONTROL WORRYING: NEARLY EVERY DAY
7. FEELING AFRAID AS IF SOMETHING AWFUL MIGHT HAPPEN: NOT AT ALL
5. BEING SO RESTLESS THAT IT IS HARD TO SIT STILL: MORE THAN HALF THE DAYS
1. FEELING NERVOUS, ANXIOUS, OR ON EDGE: NEARLY EVERY DAY
3. WORRYING TOO MUCH ABOUT DIFFERENT THINGS: NEARLY EVERY DAY

## 2017-04-24 NOTE — PROGRESS NOTES
SUBJECTIVE:                                                    Lyn Santana is a 57 year old female who presents to clinic today for the following health issues:      Anxiety Follow-Up    Status since last visit: No change    Other associated symptoms: still feels tense    Complicating factors:   Significant life event: No   Current substance abuse: None  Depression symptoms: Yes  Has been taking half of a 25 MG tablet of Zoloft. This change of dose happened about 2 weeks ago because full tablet was interfering with her sleep.  She recently discontinued Wellbutrin, had no issues getting off of this and since then has not noticed any worsening of symptoms.    LEONOR-7 SCORE 3/2/2017 4/3/2017 4/24/2017   Total Score - - -   Total Score 10 15 14        GAD7         Amount of exercise or physical activity: 2-3 days/week     Problems taking medications regularly: No    Medication side effects: still has insomnia    Diet: regular (no restrictions)      Problem list and histories reviewed & adjusted, as indicated.  Additional history: as documented    Patient Active Problem List   Diagnosis     CARDIOVASCULAR SCREENING; LDL GOAL LESS THAN 130     Mild major depression (H)     Insomnia     Anxiety     Advanced directives, counseling/discussion     Major depression in complete remission (H)     Past Surgical History:   Procedure Laterality Date     SURGICAL HISTORY OF -       cryotherapy for abnormal pap(many years ago)       Social History   Substance Use Topics     Smoking status: Former Smoker     Smokeless tobacco: Never Used     Alcohol use Yes      Comment: 4-6 per week     Family History   Problem Relation Age of Onset     CANCER Mother      blood cancer     Alcohol/Drug Mother      alcoholic     Substance Abuse Mother      C.A.D. Father      Hypertension Father      Psychotic Disorder Father      Bipolar     Bipolar Disorder Father      CANCER Maternal Grandmother      brain cancer     CANCER Maternal Grandfather   "    mouth or throat cancer     HEART DISEASE Paternal Grandfather      Heart attack  from this in 60's     Hypertension Brother      Cardiovascular Brother      Depression Brother      Alcohol/Drug Brother      drug abuse     Suicide Paternal Aunt      Depression Paternal Aunt          Current Outpatient Prescriptions   Medication Sig Dispense Refill     sertraline (ZOLOFT) 25 MG tablet Take 1 tablet (25 mg) by mouth daily Start with 1/2 tablet per day for 3-4 weeks then increase to full tablet 90 tablet 3     traZODone (DESYREL) 50 MG tablet Take 1 tablet (50 mg) by mouth nightly as needed for sleep 60 tablet 5     busPIRone (BUSPAR) 5 MG tablet Start at 5 mg twice daily for 3 days, , then 10 mg (2 tabs) twice daily for 3 days, then 12.5 mg (2.5 tabs) twice daily for 3 days, then 15 mg (3 tabs) twice daily and stay at that dose 120 tablet 3     Cholecalciferol (VITAMIN D) 2000 UNITS tablet Take 2,000 Units by mouth 2 times daily (Patient not taking: Reported on 4/3/2017) 100 tablet 3     Multiple Vitamin (MULTIVITAMINS PO) Take by mouth daily       No Known Allergies    Reviewed and updated as needed this visit by clinical staff  Tobacco  Allergies  Meds  Med Hx  Surg Hx  Fam Hx  Soc Hx      Reviewed and updated as needed this visit by Provider         ROS:  Constitutional, HEENT, cardiovascular, pulmonary, gi and gu systems are negative, except as otherwise noted.    This document serves as a record of the services and decisions personally performed and made by Ez Corbett MD. It was created on his behalf by Debra Yanez, a trained medical scribe. The creation of this document is based the provider's statements to the medical scribe.  Debra Yanez 9:27 AM 2017    OBJECTIVE:                                                    /70  Pulse 80  Temp 98.1  F (36.7  C) (Oral)  Ht 5' 0.5\" (1.537 m)  Wt 119 lb (54 kg)  BMI 22.86 kg/m2  Body mass index is 22.86 kg/(m^2).  GENERAL: " healthy, alert and no distress  CV: regular rate and rhythm, normal S1 S2, no S3 or S4, no murmur, click or rub, no peripheral edema and peripheral pulses strong  PSYCH: mentation appears normal, affect normal/bright but slightly anxious     Diagnostic Test Results:  none      ASSESSMENT/PLAN:                                                    (F41.9) Anxiety  (primary encounter diagnosis)  Comment: Still feels tense, has only been taking half a tablet of Zoloft because full 25 MG tablet was interfering with sleep.  Plan: Recommended to go back to full tablet daily in the morning and update me by the end of the week. If Zoloft continue to interfere with sleep will consider changing to another medication.    Patient Instructions   Restart taking Zoloft full dose (25mg daily)  Update me on how you are doing by the end of the week.      The information in this document, created by the medical scribe for me, accurately reflects the services I personally performed and the decisions made by me. I have reviewed and approved this document for accuracy prior to leaving the patient care area.  Ez Corbett MD, MD  [facility]

## 2017-04-24 NOTE — NURSING NOTE
"Chief Complaint   Patient presents with     Anxiety     f/u       Initial /70  Pulse 80  Temp 98.1  F (36.7  C) (Oral)  Ht 5' 0.5\" (1.537 m)  Wt 119 lb (54 kg)  BMI 22.86 kg/m2 Estimated body mass index is 22.86 kg/(m^2) as calculated from the following:    Height as of this encounter: 5' 0.5\" (1.537 m).    Weight as of this encounter: 119 lb (54 kg).  Medication Reconciliation: complete    "

## 2017-04-24 NOTE — MR AVS SNAPSHOT
After Visit Summary   4/24/2017    Lyn Santana    MRN: [id number]           Patient Information     Date Of Birth          1960        Visit Information        Provider Department      4/24/2017 8:40 AM Ez Corbett MD [facility]        Today's Diagnoses     Anxiety    -  1      Care Instructions    Restart taking Zoloft full dose (25mg daily)  Update me on how you are doing by the end of the week.        Follow-ups after your visit        Your next 10 appointments already scheduled     May 08, 2017  9:30 AM CDT   New Visit with John Tr[facility]    [address] [place] [id number] [phone]              Who to contact     If you have questions or need follow up information about today's clinic visit or your schedule please contact [facility] directly at [phone].  Normal or non-critical lab and imaging results will be communicated to you by MyChart, letter or phone within 4 business days after the clinic has received the results. If you do not hear from us within 7 days, please contact the clinic through [employer] or phone. If you have a critical or abnormal lab result, we will notify you by phone as soon as possible.  Submit refill requests through [employer] or call your pharmacy and they will forward the refill request to us. Please allow 3 business days for your refill to be completed.          Additional Information About Your Visit        MyChart Information     [employer] gives you secure access to your electronic health record. If you see a primary care provider, you can also send messages to your care team and make appointments. If you have questions, please call your primary care clinic.  If you do not have a primary care provider, please call [phone] and they will assist you.        Care EveryWhere ID     This is your Care EveryWhere ID.  "This could be used by other organizations to access your [facility] medical records  [id number]        Your Vitals Were     Pulse Temperature Height BMI (Body Mass Index)          80 98.1  F (36.7  C) (Oral) 5' 0.5\" (1.537 m) 22.86 kg/m2         Blood Pressure from Last 3 Encounters:   04/24/17 128/70   03/02/17 124/78   01/03/17 128/78    Weight from Last 3 Encounters:   04/24/17 119 lb (54 kg)   03/02/17 126 lb (57.2 kg)   01/03/17 134 lb (60.8 kg)              Today, you had the following     No orders found for display       Primary Care Provider Office Phone # Fax #    Ez Corbett [phone]       [address] [address]        Thank you!     Thank you for choosing [facility]  for your care. Our goal is always to provide you with excellent care. Hearing back from our patients is one way we can continue to improve our services. Please take a few minutes to complete the written survey that you may receive in the mail after your visit with us. Thank you!             Your Updated Medication List - Protect others around you: Learn how to safely use, store and throw away your medicines at www.disposemymeds.org.          This list is accurate as of: 4/24/17  3:20 PM.  Always use your most recent med list.                   Brand Name Dispense Instructions for use    busPIRone 5 MG tablet    BUSPAR    120 tablet    Start at 5 mg twice daily for 3 days, , then 10 mg (2 tabs) twice daily for 3 days, then 12.5 mg (2.5 tabs) twice daily for 3 days, then 15 mg (3 tabs) twice daily and stay at that dose       MULTIVITAMINS PO      Take by mouth daily       sertraline 25 MG tablet    ZOLOFT    90 tablet    Take 1 tablet (25 mg) by mouth daily Start with 1/2 tablet per day for 3-4 weeks then increase to full tablet       traZODone 50 MG tablet    DESYREL    60 tablet    Take 1 tablet (50 mg) by mouth nightly as needed for sleep    "    vitamin D 2000 UNITS tablet     100 tablet    Take 2,000 Units by mouth 2 times daily

## 2017-04-25 ASSESSMENT — ANXIETY QUESTIONNAIRES: GAD7 TOTAL SCORE: 14

## 2017-04-25 ASSESSMENT — PATIENT HEALTH QUESTIONNAIRE - PHQ9: SUM OF ALL RESPONSES TO PHQ QUESTIONS 1-9: 19

## 2017-05-07 ENCOUNTER — MYC MEDICAL ADVICE (OUTPATIENT)
Dept: FAMILY MEDICINE | Facility: CLINIC | Age: 57
End: 2017-05-07

## 2017-05-08 ENCOUNTER — OFFICE VISIT (OUTPATIENT)
Dept: BEHAVIORAL HEALTH | Facility: CLINIC | Age: 57
End: 2017-05-08
Payer: COMMERCIAL

## 2017-05-08 DIAGNOSIS — F32.0 MILD MAJOR DEPRESSION (H): Primary | ICD-10-CM

## 2017-05-08 PROCEDURE — 90791 PSYCH DIAGNOSTIC EVALUATION: CPT | Performed by: SOCIAL WORKER

## 2017-05-08 ASSESSMENT — ANXIETY QUESTIONNAIRES
3. WORRYING TOO MUCH ABOUT DIFFERENT THINGS: SEVERAL DAYS
1. FEELING NERVOUS, ANXIOUS, OR ON EDGE: SEVERAL DAYS
2. NOT BEING ABLE TO STOP OR CONTROL WORRYING: SEVERAL DAYS
5. BEING SO RESTLESS THAT IT IS HARD TO SIT STILL: SEVERAL DAYS
GAD7 TOTAL SCORE: 5
7. FEELING AFRAID AS IF SOMETHING AWFUL MIGHT HAPPEN: NOT AT ALL
IF YOU CHECKED OFF ANY PROBLEMS ON THIS QUESTIONNAIRE, HOW DIFFICULT HAVE THESE PROBLEMS MADE IT FOR YOU TO DO YOUR WORK, TAKE CARE OF THINGS AT HOME, OR GET ALONG WITH OTHER PEOPLE: SOMEWHAT DIFFICULT
6. BECOMING EASILY ANNOYED OR IRRITABLE: NOT AT ALL

## 2017-05-08 ASSESSMENT — PATIENT HEALTH QUESTIONNAIRE - PHQ9: 5. POOR APPETITE OR OVEREATING: SEVERAL DAYS
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
## 2017-05-08 NOTE — MR AVS SNAPSHOT
MRN:[id number]                      After Visit Summary   5/8/2017    Lyn Santana    MRN: [id number]           Visit Information        Provider Department      5/8/2017 9:30 AM John Tran, [facility]      Your next 10 appointments already scheduled     May 26, 2017  9:30 AM CDT   Return Visit with KAILA [facility]   [facility] ([facility])    [address] [place] [id number] [phone]              MyChart Information     [employer] gives you secure access to your electronic health record. If you see a primary care provider, you can also send messages to your care team and make appointments. If you have questions, please call your primary care clinic.  If you do not have a primary care provider, please call [phone] and they will assist you.        Care EveryWhere ID     This is your Care EveryWhere ID. This could be used by other organizations to access your [place] medical records  [id number]

## 2017-05-09 ASSESSMENT — PATIENT HEALTH QUESTIONNAIRE - PHQ9: SUM OF ALL RESPONSES TO PHQ QUESTIONS 1-9: 8

## 2017-05-09 ASSESSMENT — ANXIETY QUESTIONNAIRES: GAD7 TOTAL SCORE: 5

## 2017-05-26 ENCOUNTER — OFFICE VISIT (OUTPATIENT)
Dept: BEHAVIORAL HEALTH | Facility: CLINIC | Age: 57
End: 2017-05-26
Payer: COMMERCIAL

## 2017-05-26 DIAGNOSIS — F32.0 MILD MAJOR DEPRESSION (H): Primary | ICD-10-CM

## 2017-05-26 PROCEDURE — 90834 PSYTX W PT 45 MINUTES: CPT | Performed by: SOCIAL WORKER

## 2017-05-26 NOTE — PROGRESS NOTES
"                                             Progress Note    Client Name: Lyn Santana  Date: 5/26/2017          Service Type: Individual      Session Start Time: 930  Session End Time: 1015      Session Length: 45     Session #: 2     Attendees: Client attended alone    Treatment Plan Last Reviewed: 5/26/2017   PHQ-9 / LEONOR-7 :      DATA      Progress Since Last Session (Related to Symptoms / Goals / Homework):   Symptoms: Stable    Homework: Partially completed      Episode of Care Goals: Satisfactory progress - PREPARATION (Decided to change - considering how); Intervened by negotiating a change plan and determining options / strategies for behavior change, identifying triggers, exploring social supports, and working towards setting a date to begin behavior change     Current / Ongoing Stressors and Concerns:   Client notes that she has done a few gym visits this week and has done some cardio while she was there.  Also has managed to do some deep breathing most nights before bed.  Has been trying to be more social lately and went out for lunch with a friend; feels like she was very \"negative\" during this interaction.  Does note that she has been having more negative thoughts lately.          Discussed mindfulness as being aware of what we are experiencing while we are experiencing it.  Contrasted this with avoidance and rumination.  Explored the role of mindfulness as an overall coping strategy for managing depression, anxiety, and strong emotions.  Explained concept of state of mind using SIFT (sensations, images, feelings, thoughts) pneumonic.  Led client in a guided mindfulness exercise focusing on sensations, images, feelings, and thoughts.  Discussed mindfulness as a tool to intentionally shift our awareness and focus as needed.             Treatment Objective(s) Addressed in This Session:    Client will use identified behavioral and cognitive skills to challenge negative self talk 90% of the " time.     Intervention:   CBT: -   Discussed cognitive restructuring and behavioral activation.  Explored the connection between thoughts, feelings, and actions by using examples relative to client's presenting concerns.  Explained major domains of symptoms (cognitive, emotional, somatic, behavioral, interpersonal) and importance of targeted and specific interventions to reduce symptoms of anxiety and depression.  Discussed role of symptom monitoring in cognitive behavioral treatment.       ASSESSMENT: Current Emotional / Mental Status (status of significant symptoms):   Risk status (Self / Other harm or suicidal ideation)   Client denies current fears or concerns for personal safety.   Client denies current or recent suicidal ideation or behaviors.   Client denies current or recent homicidal ideation or behaviors.   Client denies current or recent self injurious behavior or ideation.   Client denies other safety concerns.   A safety and risk management plan has not been developed at this time, however client was given the after-hours number / 911 should there be a change in any of these risk factors.     Appearance:   Appropriate    Eye Contact:   Good    Psychomotor Behavior: Normal    Attitude:   Cooperative    Orientation:   All   Speech    Rate / Production: Normal     Volume:  Normal    Mood:    Depressed    Affect:    Appropriate    Thought Content:  Clear    Thought Form:  Coherent  Logical    Insight:    Good      Medication Review:   No changes to current psychiatric medication(s)     Medication Compliance:   Yes     Changes in Health Issues:   None reported     Chemical Use Review:   Substance Use: Chemical use reviewed, no active concerns identified      Tobacco Use: No current tobacco use.       Collateral Reports Completed:   Not Applicable    PLAN: (Client Tasks / Therapist Tasks / Other)  1.  Client will engage in one mindfulness exercise per day using SIFT method by next session.  Client will also  rate mood and intensity of mood on a SUDS scale (1-10) before and after exercise at least once by next session.  2.  Meet next           Randy Contreras, KAILA                                                         ________________________________________________________________________    Treatment Plan    Client's Name: Lyn Santana  YOB: 1960    Date: 5/26/2017     DSM5 Diagnoses: (Sustained by DSM5 Criteria Listed Above)  Diagnoses: 296.31 Major Depressive Disorder, Recurrent Episode, Mild _  300.02 (F41.1) Generalized Anxiety Disorder  Psychosocial & Contextual Factors: good social support  WHODAS 2.0 (12 item)            This questionnaire asks about difficulties due to health conditions. Health conditions  include  disease or illnesses, other health problems that may be short or long lasting,  injuries, mental health or emotional problems, and problems with alcohol or drugs.                     Think back over the past 30 days and answer these questions, thinking about how much  difficulty you had doing the following activities. For each question, please [place] only  one response.    S1 Standing for long periods such as 30 minutes? None =         1   S2 Taking care of household responsibilities? Moderate =   3   S3 Learning a new task, for example, learning how to get to a new place? Mild =           2   S4 How much of a problem do you have joining community activities (for example, festivals, Christian or other activities) in the same way as anyone else can? Severe =       4   S5 How much have you been emotionally affected by your health problems? Severe =       4     In the past 30 days, how much difficulty did you have in:   S6 Concentrating on doing something for ten minutes? Moderate =   3   S7 Walking a long distance such as a kilometer (or equivalent)? None =         1   S8 Washing your whole body? None =         1   S9 Getting dressed? Mild =           2   S10 Dealing with people  you do not know? Moderate =   3   S11 Maintaining a friendship? Severe =       4   S12 Your day to day work? Severe =       4     H1 Overall, in the past 30 days, how many days were these difficulties present? Record number of days 20   H2 In the past 30 days, for how many days were you totally unable to carry out your usual activities or work because of any health condition? Record number of days  0   H3 In the past 30 days, not counting the days that you were totally unable, for how many days did you cut back or reduce your usual activities or work because of any health condition? Record number of days 15       Referral / Collaboration:  Referral to another professional/service is not indicated at this time..    Anticipated number of session or this episode of care: 12-18      MeasurableTreatment Goal(s) related to diagnosis / functional impairment(s)  Goal-Depression: Client will decrease depressed mood    I will know I've met my goal when I am less depressed.      Objective #A (Client Action)    Status: New - Date: 5/26/2017    Client will use identified behavioral and cognitive skills to challenge negative self talk 90% of the time.    Intervention(s)  Therapist will provide psychoeducation, behavioral activation, and cognitive restructuring.      Objective #B  Client will complete at least 10 minutes of self-regulation practice (e.g.: yoga, meditation, relaxation breathing, etc.) per day.    Status: New - Date: 5/26/2017    Intervention(s)  Therapist will provide psychoeducation, behavioral activation, and cognitive restructuring.      Objective #C  Client will exercise 30 minutes 36 times in the next 12 weeks.  Status: New - Date: 5/26/2017    Intervention(s)  Therapist will provide psychoeducation, behavioral activation, and cognitive restructuring.              Client has reviewed and agreed to the above plan.      Randy Contrersa, [facility]  May 26, 2017

## 2017-05-26 NOTE — MR AVS SNAPSHOT
MRN:[id number]                      After Visit Summary   5/26/2017    Lyn Santana    MRN: [id number]           Visit Information        Provider Department      5/26/2017 9:30 AM John Tran, KAILA [facility]      Your next 10 appointments already scheduled     Jun 26, 2017  8:30 AM CDT   Return Visit with KAILA Whitehead   [facility] ([facility])    [address] [place] [id number] [phone]              MyChart Information     [employer] gives you secure access to your electronic health record. If you see a primary care provider, you can also send messages to your care team and make appointments. If you have questions, please call your primary care clinic.  If you do not have a primary care provider, please call [phone] and they will assist you.        Care EveryWhere ID     This is your Care EveryWhere ID. This could be used by other organizations to access your [place] medical records  [id number]

## 2017-06-10 DIAGNOSIS — F33.0 MAJOR DEPRESSIVE DISORDER, RECURRENT EPISODE, MILD (H): ICD-10-CM

## 2017-06-10 DIAGNOSIS — F41.9 ANXIETY: ICD-10-CM

## 2017-06-12 ENCOUNTER — MYC MEDICAL ADVICE (OUTPATIENT)
Dept: FAMILY MEDICINE | Facility: CLINIC | Age: 57
End: 2017-06-12

## 2017-06-12 DIAGNOSIS — F41.9 ANXIETY: ICD-10-CM

## 2017-06-12 DIAGNOSIS — F33.0 MAJOR DEPRESSIVE DISORDER, RECURRENT EPISODE, MILD (H): ICD-10-CM

## 2017-06-12 RX ORDER — SERTRALINE HYDROCHLORIDE 25 MG/1
TABLET, FILM COATED ORAL
Refills: 0 | OUTPATIENT
Start: 2017-06-12

## 2017-06-12 NOTE — TELEPHONE ENCOUNTER
Sertraline 25mg tablets     Last Written Prescription Date: 4/3/17  Last Fill Quantity: 90, # refills: 3  Last Office Visit with G primary care provider:  4/24/17   Next 5 appointments (look out 90 days)     Jun 26, 2017  8:30 AM CDT   Return Visit with John Tran [facility] ([facility])    [address] [place] [id number] [phone]                   Last PHQ-9 score on record=   PHQ-9 SCORE 5/8/2017   Total Score 8     Duplicate - a year's worth of medication was sent in April. Rx refused with message to pharmacy.     Lydia Shah RN

## 2017-06-13 RX ORDER — SERTRALINE HYDROCHLORIDE 25 MG/1
37.5 TABLET, FILM COATED ORAL DAILY
Qty: 135 TABLET | Refills: 3 | Status: SHIPPED | OUTPATIENT
Start: 2017-06-13 | End: 2017-06-13

## 2017-06-13 RX ORDER — SERTRALINE HYDROCHLORIDE 25 MG/1
37.5 TABLET, FILM COATED ORAL DAILY
Qty: 135 TABLET | Refills: 3 | Status: SHIPPED | OUTPATIENT
Start: 2017-06-13 | End: 2018-03-23

## 2017-06-26 ENCOUNTER — OFFICE VISIT (OUTPATIENT)
Dept: BEHAVIORAL HEALTH | Facility: CLINIC | Age: 57
End: 2017-06-26
Payer: COMMERCIAL

## 2017-06-26 DIAGNOSIS — F32.0 MILD MAJOR DEPRESSION (H): Primary | ICD-10-CM

## 2017-06-26 PROCEDURE — 90834 PSYTX W PT 45 MINUTES: CPT | Performed by: SOCIAL WORKER

## 2017-06-26 NOTE — MR AVS SNAPSHOT
MRN:[id number]                      After Visit Summary   6/26/2017    Lyn Santana    MRN: [id number]           Visit Information        Provider Department      6/26/2017 8:30 AM John Tran, KAILA [facility]      Your next 10 appointments already scheduled     Jul 17, 2017  8:30 AM CDT   Return Visit with KAILA Whitehead   [facility] ([facility])    [address] [place] [id number] [phone]              MyChart Information     [employer] gives you secure access to your electronic health record. If you see a primary care provider, you can also send messages to your care team and make appointments. If you have questions, please call your primary care clinic.  If you do not have a primary care provider, please call [phone] and they will assist you.        Care EveryWhere ID     This is your Care EveryWhere ID. This could be used by other organizations to access your [place] medical records  [id number]        Equal Access to Services     AMITA SQUIRES : Hadii brenda petersen hadasho Soolayinkaali, waaxda luqadaha, qaybta kaalmada adeegyasusanne, trang stover. So [facility] [phone].    ATENCIÓN: Si habla español, tiene a day disposición servicios gratuitos de asistencia lingüística. Llame al [phone].    We comply with applicable federal civil rights laws and [place] laws. We do not discriminate on the basis of race, color, national origin, age, disability sex, sexual orientation or gender identity.

## 2017-07-17 ENCOUNTER — OFFICE VISIT (OUTPATIENT)
Dept: BEHAVIORAL HEALTH | Facility: CLINIC | Age: 57
End: 2017-07-17
Payer: COMMERCIAL

## 2017-07-17 DIAGNOSIS — F32.0 MILD MAJOR DEPRESSION (H): Primary | ICD-10-CM

## 2017-07-17 PROCEDURE — 90834 PSYTX W PT 45 MINUTES: CPT | Performed by: SOCIAL WORKER

## 2017-07-17 NOTE — PROGRESS NOTES
"                                             Progress Note    Client Name: Lyn Santana  Date: 7/17/2017          Service Type: Individual      Session Start Time: 830  Session End Time: 915      Session Length: 45     Session #: 3     Attendees: Client attended alone    Treatment Plan Last Reviewed: 5/26/2017   PHQ-9 / LEONOR-7 :      DATA      Progress Since Last Session (Related to Symptoms / Goals / Homework):   Symptoms: Stable    Homework: Partially completed      Episode of Care Goals: Satisfactory progress - PREPARATION (Decided to change - considering how); Intervened by negotiating a change plan and determining options / strategies for behavior change, identifying triggers, exploring social supports, and working towards setting a date to begin behavior change     Current / Ongoing Stressors and Concerns:   Client notes that things have been \"not very good.\"  Feeling \"empty\", tried to work on her ACEs last week a bit and thinks that this did help.  Would like to get to the point where she is doing her ACEs each day.  Long discussion today about CBT and detailed the use of the CBT thought record sheet.  Discussed recent activating event for client and processed it in session today linking emotions, sensations, negative thoughts and underlying core myths, evidence for and against the beliefs, and alternative explanations.             Treatment Objective(s) Addressed in This Session:    Client will use identified behavioral and cognitive skills to challenge negative self talk 90% of the time.     Intervention:   CBT: -   Discussed cognitive restructuring and behavioral activation.  Explored the connection between thoughts, feelings, and actions by using examples relative to client's presenting concerns.  Explained major domains of symptoms (cognitive, emotional, somatic, behavioral, interpersonal) and importance of targeted and specific interventions to reduce symptoms of anxiety and depression.  Discussed " role of symptom monitoring in cognitive behavioral treatment.       ASSESSMENT: Current Emotional / Mental Status (status of significant symptoms):   Risk status (Self / Other harm or suicidal ideation)   Client denies current fears or concerns for personal safety.   Client denies current or recent suicidal ideation or behaviors.   Client denies current or recent homicidal ideation or behaviors.   Client denies current or recent self injurious behavior or ideation.   Client denies other safety concerns.   A safety and risk management plan has not been developed at this time, however client was given the after-hours number / 911 should there be a change in any of these risk factors.     Appearance:   Appropriate    Eye Contact:   Good    Psychomotor Behavior: Normal    Attitude:   Cooperative    Orientation:   All   Speech    Rate / Production: Normal     Volume:  Normal    Mood:    Depressed    Affect:    Appropriate    Thought Content:  Clear    Thought Form:  Coherent  Logical    Insight:    Good      Medication Review:   No changes to current psychiatric medication(s)     Medication Compliance:   Yes     Changes in Health Issues:   None reported     Chemical Use Review:   Substance Use: Chemical use reviewed, no active concerns identified      Tobacco Use: No current tobacco use.       Collateral Reports Completed:   Not Applicable    PLAN: (Client Tasks / Therapist Tasks / Other)  1.  Client will engage in one mindfulness exercise per day using SIFT method by next session.  Client will also rate mood and intensity of mood on a SUDS scale (1-10) before and after exercise at least once by next session.  2.  Thought record sheet at next session  3.  Meet next           KAILA Quintero                                                         ________________________________________________________________________    Treatment Plan    Client's Name: Lyn Santana  YOB: 1960    Date:  5/26/2017     DSM5 Diagnoses: (Sustained by DSM5 Criteria Listed Above)  Diagnoses: 296.31 Major Depressive Disorder, Recurrent Episode, Mild _  300.02 (F41.1) Generalized Anxiety Disorder  Psychosocial & Contextual Factors: good social support  WHODAS 2.0 (12 item)            This questionnaire asks about difficulties due to health conditions. Health conditions  include  disease or illnesses, other health problems that may be short or long lasting,  injuries, mental health or emotional problems, and problems with alcohol or drugs.                     Think back over the past 30 days and answer these questions, thinking about how much  difficulty you had doing the following activities. For each question, please [place] only  one response.    S1 Standing for long periods such as 30 minutes? None =         1   S2 Taking care of household responsibilities? Moderate =   3   S3 Learning a new task, for example, learning how to get to a new place? Mild =           2   S4 How much of a problem do you have joining community activities (for example, festivals, Taoism or other activities) in the same way as anyone else can? Severe =       4   S5 How much have you been emotionally affected by your health problems? Severe =       4     In the past 30 days, how much difficulty did you have in:   S6 Concentrating on doing something for ten minutes? Moderate =   3   S7 Walking a long distance such as a kilometer (or equivalent)? None =         1   S8 Washing your whole body? None =         1   S9 Getting dressed? Mild =           2   S10 Dealing with people you do not know? Moderate =   3   S11 Maintaining a friendship? Severe =       4   S12 Your day to day work? Severe =       4     H1 Overall, in the past 30 days, how many days were these difficulties present? Record number of days 20   H2 In the past 30 days, for how many days were you totally unable to carry out your usual activities or work because of any health condition?  Record number of days  0   H3 In the past 30 days, not counting the days that you were totally unable, for how many days did you cut back or reduce your usual activities or work because of any health condition? Record number of days 15       Referral / Collaboration:  Referral to another professional/service is not indicated at this time..    Anticipated number of session or this episode of care: 12-18      MeasurableTreatment Goal(s) related to diagnosis / functional impairment(s)  Goal-Depression: Client will decrease depressed mood    I will know I've met my goal when I am less depressed.      Objective #A (Client Action)    Status: New - Date: 5/26/2017    Client will use identified behavioral and cognitive skills to challenge negative self talk 90% of the time.    Intervention(s)  Therapist will provide psychoeducation, behavioral activation, and cognitive restructuring.      Objective #B  Client will complete at least 10 minutes of self-regulation practice (e.g.: yoga, meditation, relaxation breathing, etc.) per day.    Status: New - Date: 5/26/2017    Intervention(s)  Therapist will provide psychoeducation, behavioral activation, and cognitive restructuring.      Objective #C  Client will exercise 30 minutes 36 times in the next 12 weeks.  Status: New - Date: 5/26/2017    Intervention(s)  Therapist will provide psychoeducation, behavioral activation, and cognitive restructuring.              Client has reviewed and agreed to the above plan.      Randy Contreras, [facility]  May 26, 2017

## 2017-07-17 NOTE — MR AVS SNAPSHOT
MRN:[id number]                      After Visit Summary   7/17/2017    Lyn Santana    MRN: [id number]           Visit Information        Provider Department      7/17/2017 8:30 AM Jhon Tran, KAILA [facility]      Your next 10 appointments already scheduled     Aug 01, 2017  7:30 AM CDT   Return Visit with KAILA Whitehead   [facility] ([facility])    [address] [place] [id number] [phone]              MyChart Information     [employer] gives you secure access to your electronic health record. If you see a primary care provider, you can also send messages to your care team and make appointments. If you have questions, please call your primary care clinic.  If you do not have a primary care provider, please call [phone] and they will assist you.        Care EveryWhere ID     This is your Care EveryWhere ID. This could be used by other organizations to access your [facility] medical records  [id number]        Equal Access to Services     AMITA SQUIRES : Hadii brenda petersen hadasho Soolayinkaali, waaxda luqadaha, qaybta kaalmada adeegyasusanne, trang iglesias . So [facility] [phone].    ATENCIÓN: Si habla español, tiene a day disposición servicios gratuitos de asistencia lingüística. Llame al [phone].    We comply with applicable federal civil rights laws and [place] laws. We do not discriminate on the basis of race, color, national origin, age, disability sex, sexual orientation or gender identity.

## 2017-08-07 ENCOUNTER — OFFICE VISIT (OUTPATIENT)
Dept: BEHAVIORAL HEALTH | Facility: CLINIC | Age: 57
End: 2017-08-07
Payer: COMMERCIAL

## 2017-08-07 DIAGNOSIS — F32.5 MAJOR DEPRESSION IN COMPLETE REMISSION (H): Primary | ICD-10-CM

## 2017-08-07 PROCEDURE — 90834 PSYTX W PT 45 MINUTES: CPT | Performed by: SOCIAL WORKER

## 2017-08-07 NOTE — MR AVS SNAPSHOT
MRN:[id number]                      After Visit Summary   8/7/2017    Lyn Santana    MRN: [id number]           Visit Information        Provider Department      8/7/2017 8:30 AM John Tran, KAILA [facility]      Your next 10 appointments already scheduled     Aug 28, 2017  8:30 AM CDT   Return Visit with KAILA Whitehead   [facility] ([facility]    [address] [place] [address] [phone]              MyChart Information     [employer] gives you secure access to your electronic health record. If you see a primary care provider, you can also send messages to your care team and make appointments. If you have questions, please call your primary care clinic.  If you do not have a primary care provider, please call [phone] and they will assist you.        Care EveryWhere ID     This is your Care EveryWhere ID. This could be used by other organizations to access your [place] medical records  [id number]        Equal Access to Services     AMITA SQUIRES : Hadii brenda petersen hadasho Soolayinkaali, waaxda luqadaha, qaybta kaalmada adeegyasusanne, trang iglesias . So [facility] [phone].    ATENCIÓN: Si habla español, tiene a day disposición servicios gratuitos de asistencia lingüística. Llame al [phone].    We comply with applicable federal civil rights laws and [place] laws. We do not discriminate on the basis of race, color, national origin, age, disability sex, sexual orientation or gender identity.

## 2017-08-07 NOTE — PROGRESS NOTES
"                                             Progress Note    Client Name: Lyn Santana  Date: 8/7/2017          Service Type: Individual      Session Start Time: 830  Session End Time: 915      Session Length: 45     Session #: 4     Attendees: Client attended alone    Treatment Plan Last Reviewed: 5/26/2017   PHQ-9 / LEONOR-7 :      DATA      Progress Since Last Session (Related to Symptoms / Goals / Homework):   Symptoms: Stable    Homework: Partially completed   1.  Client will engage in one mindfulness exercise per day using SIFT method by next session.  Client will also rate mood and intensity of mood on a SUDS scale (1-10) before and after exercise at least once by next session.  2.  Thought record sheet at next session  3.  Meet next         Episode of Care Goals: Satisfactory progress - PREPARATION (Decided to change - considering how); Intervened by negotiating a change plan and determining options / strategies for behavior change, identifying triggers, exploring social supports, and working towards setting a date to begin behavior change     Current / Ongoing Stressors and Concerns:    Client notes that things have been \"not sure that things are getting much better.\"  Still struggling with low mood and motivation.  Also having some trouble with negative thinking.  Discussed mindfulness as being aware of what we are experiencing while we are experiencing it.  Contrasted this with avoidance and rumination.  Explored the role of mindfulness as an overall coping strategy for managing depression, anxiety, and strong emotions.  Explained concept of state of mind using SIFT (sensations, images, feelings, thoughts) pneumonic.  Led client in a guided mindfulness exercise focusing on sensations, images, feelings, and thoughts.  Discussed mindfulness as a tool to intentionally shift our awareness and focus as needed.               Treatment Objective(s) Addressed in This Session:    Client will use identified " behavioral and cognitive skills to challenge negative self talk 90% of the time.     Intervention:   CBT: -   Discussed cognitive restructuring and behavioral activation.  Explored the connection between thoughts, feelings, and actions by using examples relative to client's presenting concerns.  Explained major domains of symptoms (cognitive, emotional, somatic, behavioral, interpersonal) and importance of targeted and specific interventions to reduce symptoms of anxiety and depression.  Discussed role of symptom monitoring in cognitive behavioral treatment.       ASSESSMENT: Current Emotional / Mental Status (status of significant symptoms):   Risk status (Self / Other harm or suicidal ideation)   Client denies current fears or concerns for personal safety.   Client denies current or recent suicidal ideation or behaviors.   Client denies current or recent homicidal ideation or behaviors.   Client denies current or recent self injurious behavior or ideation.   Client denies other safety concerns.   A safety and risk management plan has not been developed at this time, however client was given the after-hours number / 911 should there be a change in any of these risk factors.     Appearance:   Appropriate    Eye Contact:   Good    Psychomotor Behavior: Normal    Attitude:   Cooperative    Orientation:   All   Speech    Rate / Production: Normal     Volume:  Normal    Mood:    Depressed    Affect:    Appropriate    Thought Content:  Clear    Thought Form:  Coherent  Logical    Insight:    Good      Medication Review:   No changes to current psychiatric medication(s)     Medication Compliance:   Yes     Changes in Health Issues:   None reported     Chemical Use Review:   Substance Use: Chemical use reviewed, no active concerns identified      Tobacco Use: No current tobacco use.       Collateral Reports Completed:   Not Applicable    PLAN: (Client Tasks / Therapist Tasks / Other)  1.  Client will engage in one  mindfulness exercise per day using SIFT method by next session.  Client will also rate mood and intensity of mood on a SUDS scale (1-10) before and after exercise at least once by next session.  2.  Thought record sheet at next session  3.  Meet next           KAILA Quintero                                                         ________________________________________________________________________    Treatment Plan    Client's Name: Lyn Santana  YOB: 1960    Date: 5/26/2017     DSM5 Diagnoses: (Sustained by DSM5 Criteria Listed Above)  Diagnoses: 296.31 Major Depressive Disorder, Recurrent Episode, Mild _  300.02 (F41.1) Generalized Anxiety Disorder  Psychosocial & Contextual Factors: good social support  WHODAS 2.0 (12 item)            This questionnaire asks about difficulties due to health conditions. Health conditions  include  disease or illnesses, other health problems that may be short or long lasting,  injuries, mental health or emotional problems, and problems with alcohol or drugs.                     Think back over the past 30 days and answer these questions, thinking about how much  difficulty you had doing the following activities. For each question, please [place] only  one response.    S1 Standing for long periods such as 30 minutes? None =         1   S2 Taking care of household responsibilities? Moderate =   3   S3 Learning a new task, for example, learning how to get to a new place? Mild =           2   S4 How much of a problem do you have joining community activities (for example, festivals, L[employer] day or other activities) in the same way as anyone else can? Severe =       4   S5 How much have you been emotionally affected by your health problems? Severe =       4     In the past 30 days, how much difficulty did you have in:   S6 Concentrating on doing something for ten minutes? Moderate =   3   S7 Walking a long distance such as a kilometer (or equivalent)? None =          1   S8 Washing your whole body? None =         1   S9 Getting dressed? Mild =           2   S10 Dealing with people you do not know? Moderate =   3   S11 Maintaining a friendship? Severe =       4   S12 Your day to day work? Severe =       4     H1 Overall, in the past 30 days, how many days were these difficulties present? Record number of days 20   H2 In the past 30 days, for how many days were you totally unable to carry out your usual activities or work because of any health condition? Record number of days  0   H3 In the past 30 days, not counting the days that you were totally unable, for how many days did you cut back or reduce your usual activities or work because of any health condition? Record number of days 15       Referral / Collaboration:  Referral to another professional/service is not indicated at this time..    Anticipated number of session or this episode of care: 12-18      MeasurableTreatment Goal(s) related to diagnosis / functional impairment(s)  Goal-Depression: Client will decrease depressed mood    I will know I've met my goal when I am less depressed.      Objective #A (Client Action)    Status: New - Date: 5/26/2017    Client will use identified behavioral and cognitive skills to challenge negative self talk 90% of the time.    Intervention(s)  Therapist will provide psychoeducation, behavioral activation, and cognitive restructuring.      Objective #B  Client will complete at least 10 minutes of self-regulation practice (e.g.: yoga, meditation, relaxation breathing, etc.) per day.    Status: New - Date: 5/26/2017    Intervention(s)  Therapist will provide psychoeducation, behavioral activation, and cognitive restructuring.      Objective #C  Client will exercise 30 minutes 36 times in the next 12 weeks.  Status: New - Date: 5/26/2017    Intervention(s)  Therapist will provide psychoeducation, behavioral activation, and cognitive restructuring.              Client has reviewed and  agreed to the above plan.      Randy Contreras, [facility]  May 26, 2017

## 2017-08-28 ENCOUNTER — OFFICE VISIT (OUTPATIENT)
Dept: BEHAVIORAL HEALTH | Facility: CLINIC | Age: 57
End: 2017-08-28
Payer: COMMERCIAL

## 2017-08-28 DIAGNOSIS — F32.0 MILD MAJOR DEPRESSION (H): Primary | ICD-10-CM

## 2017-08-28 PROCEDURE — 90834 PSYTX W PT 45 MINUTES: CPT | Performed by: SOCIAL WORKER

## 2017-08-28 NOTE — PROGRESS NOTES
"                                             Progress Note    Client Name: Lyn Santana  Date: 8/28/2017          Service Type: Individual      Session Start Time: 830  Session End Time: 915      Session Length: 45     Session #: 5     Attendees: Client attended alone    Treatment Plan Last Reviewed: 5/26/2017   PHQ-9 / LEONOR-7 :      DATA      Progress Since Last Session (Related to Symptoms / Goals / Homework):   Symptoms: Stable    Homework: Partially completed   1.  Client will engage in one mindfulness exercise per day using SIFT method by next session.  Client will also rate mood and intensity of mood on a SUDS scale (1-10) before and after exercise at least once by next session.  2.  Thought record sheet at next session  3.  Meet next         Episode of Care Goals: Satisfactory progress - PREPARATION (Decided to change - considering how); Intervened by negotiating a change plan and determining options / strategies for behavior change, identifying triggers, exploring social supports, and working towards setting a date to begin behavior change     Current / Ongoing Stressors and Concerns:    Client notes that things have been \"I am not doing the ACE that I should be doing.\"  Finds that she looks back at the end of the day and realizes that she has not accomplished much.  Also has not been doing journaling to keep track of her activities which she knows that she should be.  Closeness and enjoyment seem to be elusive.  Client did go on vacation with her brother and sister-in-law, both of them have some medical limitations.         Treatment Objective(s) Addressed in This Session:    Client will use identified behavioral and cognitive skills to challenge negative self talk 90% of the time.     Intervention:   CBT: -   Discussed cognitive restructuring and behavioral activation.  Explored the connection between thoughts, feelings, and actions by using examples relative to client's presenting concerns.  Explained " major domains of symptoms (cognitive, emotional, somatic, behavioral, interpersonal) and importance of targeted and specific interventions to reduce symptoms of anxiety and depression.  Discussed role of symptom monitoring in cognitive behavioral treatment.       ASSESSMENT: Current Emotional / Mental Status (status of significant symptoms):   Risk status (Self / Other harm or suicidal ideation)   Client denies current fears or concerns for personal safety.   Client denies current or recent suicidal ideation or behaviors.   Client denies current or recent homicidal ideation or behaviors.   Client denies current or recent self injurious behavior or ideation.   Client denies other safety concerns.   A safety and risk management plan has not been developed at this time, however client was given the after-hours number / 911 should there be a change in any of these risk factors.     Appearance:   Appropriate    Eye Contact:   Good    Psychomotor Behavior: Normal    Attitude:   Cooperative    Orientation:   All   Speech    Rate / Production: Normal     Volume:  Normal    Mood:    Depressed    Affect:    Appropriate    Thought Content:  Clear    Thought Form:  Coherent  Logical    Insight:    Good      Medication Review:   No changes to current psychiatric medication(s)     Medication Compliance:   Yes     Changes in Health Issues:   None reported     Chemical Use Review:   Substance Use: Chemical use reviewed, no active concerns identified      Tobacco Use: No current tobacco use.       Collateral Reports Completed:   Not Applicable    PLAN: (Client Tasks / Therapist Tasks / Other)  1.  Client will engage in one mindfulness exercise per day using SIFT method by next session.  Client will also rate mood and intensity of mood on a SUDS scale (1-10) before and after exercise at least once by next session.  2.  Thought record sheet at next session  3.  Meet next           KAILA Quintero                                                          ________________________________________________________________________    Treatment Plan    Client's Name: Lyn Santana  YOB: 1960    Date: 5/26/2017     DSM5 Diagnoses: (Sustained by DSM5 Criteria Listed Above)  Diagnoses: 296.31 Major Depressive Disorder, Recurrent Episode, Mild _  300.02 (F41.1) Generalized Anxiety Disorder  Psychosocial & Contextual Factors: good social support  WHODAS 2.0 (12 item)            This questionnaire asks about difficulties due to health conditions. Health conditions  include  disease or illnesses, other health problems that may be short or long lasting,  injuries, mental health or emotional problems, and problems with alcohol or drugs.                     Think back over the past 30 days and answer these questions, thinking about how much  difficulty you had doing the following activities. For each question, please [place] only  one response.    S1 Standing for long periods such as 30 minutes? None =         1   S2 Taking care of household responsibilities? Moderate =   3   S3 Learning a new task, for example, learning how to get to a new place? Mild =           2   S4 How much of a problem do you have joining community activities (for example, festivals, [employer] or other activities) in the same way as anyone else can? Severe =       4   S5 How much have you been emotionally affected by your health problems? Severe =       4     In the past 30 days, how much difficulty did you have in:   S6 Concentrating on doing something for ten minutes? Moderate =   3   S7 Walking a long distance such as a kilometer (or equivalent)? None =         1   S8 Washing your whole body? None =         1   S9 Getting dressed? Mild =           2   S10 Dealing with people you do not know? Moderate =   3   S11 Maintaining a friendship? Severe =       4   S12 Your day to day work? Severe =       4     H1 Overall, in the past 30 days, how many days were these  difficulties present? Record number of days 20   H2 In the past 30 days, for how many days were you totally unable to carry out your usual activities or work because of any health condition? Record number of days  0   H3 In the past 30 days, not counting the days that you were totally unable, for how many days did you cut back or reduce your usual activities or work because of any health condition? Record number of days 15       Referral / Collaboration:  Referral to another professional/service is not indicated at this time..    Anticipated number of session or this episode of care: 12-18      MeasurableTreatment Goal(s) related to diagnosis / functional impairment(s)  Goal-Depression: Client will decrease depressed mood    I will know I've met my goal when I am less depressed.      Objective #A (Client Action)    Status: New - Date: 5/26/2017    Client will use identified behavioral and cognitive skills to challenge negative self talk 90% of the time.    Intervention(s)  Therapist will provide psychoeducation, behavioral activation, and cognitive restructuring.      Objective #B  Client will complete at least 10 minutes of self-regulation practice (e.g.: yoga, meditation, relaxation breathing, etc.) per day.    Status: New - Date: 5/26/2017    Intervention(s)  Therapist will provide psychoeducation, behavioral activation, and cognitive restructuring.      Objective #C  Client will exercise 30 minutes 36 times in the next 12 weeks.  Status: New - Date: 5/26/2017    Intervention(s)  Therapist will provide psychoeducation, behavioral activation, and cognitive restructuring.              Client has reviewed and agreed to the above plan.      Randy Contreras, [facility]  May 26, 2017

## 2017-08-28 NOTE — MR AVS SNAPSHOT
MRN:[id number]                      After Visit Summary   8/28/2017    Lyn Santana    MRN: [id number]           Visit Information        Provider Department      8/28/2017 8:30 AM John Tran, KAILA [facility]      Your next 10 appointments already scheduled     Sep 18, 2017  8:30 AM CDT   Return Visit with KAILA [place]   [facility] ([facility])    [address] [place] [id number] [phone]              MyChart Information     [employer] gives you secure access to your electronic health record. If you see a primary care provider, you can also send messages to your care team and make appointments. If you have questions, please call your primary care clinic.  If you do not have a primary care provider, please call [phone] and they will assist you.        Care EveryWhere ID     This is your Care EveryWhere ID. This could be used by other organizations to access your [place] medical records  [id number]        Equal Access to Services     AMITA SQUIRES : Hadii brenda petersen hadasho Soolayinkaali, waaxda luqadaha, qaybta kaalmada adeegyasusanne, trang stover. So [facility] [phone].    ATENCIÓN: Si habla español, tiene a day disposición servicios gratuitos de asistencia lingüística. Llame al [phone].    We comply with applicable federal civil rights laws and [place] laws. We do not discriminate on the basis of race, color, national origin, age, disability sex, sexual orientation or gender identity.

## 2017-09-15 ENCOUNTER — TELEPHONE (OUTPATIENT)
Dept: FAMILY MEDICINE | Facility: CLINIC | Age: 57
End: 2017-09-15

## 2017-09-15 NOTE — TELEPHONE ENCOUNTER
Please repeat PHQ-9.  Index date: 3/2/17  Follow up start date:  8/2/17  Follow up end date:  10/2/17    Carolina Lezama MA  '

## 2017-10-02 NOTE — TELEPHONE ENCOUNTER
Chart reviewed. MY CHART message with PHQ9 questionnaire has not been opened by Lyn.  Telephone call to Lyn at home / cell # and left a message asking that she complete the questionnaire that had been sent to her via MY CHART or call back to clinic to speak with a triage nurse to assist her.  Krystina Acosta RN

## 2017-10-04 NOTE — TELEPHONE ENCOUNTER
Chart reviewed. The MY CHART message has not been opened/read.  Krystina Acosta RN  Triage  [facility]

## 2017-10-25 NOTE — TELEPHONE ENCOUNTER
PHQ9 overall unchanged. Slightly worse. She is working with counselor. I sent a my chart message for her to update me on how she thinks she is doing.     Taqueria Corbett MD

## 2017-12-01 ENCOUNTER — OFFICE VISIT (OUTPATIENT)
Dept: OPHTHALMOLOGY | Facility: CLINIC | Age: 57
End: 2017-12-01
Payer: COMMERCIAL

## 2017-12-01 DIAGNOSIS — H43.811 POSTERIOR VITREOUS DETACHMENT OF RIGHT EYE: Primary | ICD-10-CM

## 2017-12-01 DIAGNOSIS — H52.4 PRESBYOPIA: ICD-10-CM

## 2017-12-01 PROCEDURE — 92004 COMPRE OPH EXAM NEW PT 1/>: CPT | Performed by: STUDENT IN AN ORGANIZED HEALTH CARE EDUCATION/TRAINING PROGRAM

## 2017-12-01 PROCEDURE — 92015 DETERMINE REFRACTIVE STATE: CPT | Performed by: STUDENT IN AN ORGANIZED HEALTH CARE EDUCATION/TRAINING PROGRAM

## 2017-12-01 ASSESSMENT — REFRACTION_WEARINGRX
OD_AXIS: 150
OD_CYLINDER: +0.75
OS_SPHERE: -2.75
OS_AXIS: 027
OD_SPHERE: -1.50
OD_ADD: +2.50
SPECS_TYPE: PAL
OS_ADD: +2.50
OS_CYLINDER: +0.75

## 2017-12-01 ASSESSMENT — REFRACTION_MANIFEST
OS_ADD: +2.50
OD_SPHERE: -1.50
OD_CYLINDER: +0.75
OS_CYLINDER: +0.75
OD_ADD: +2.50
OD_AXIS: 157
OS_AXIS: 025
OS_SPHERE: -2.75

## 2017-12-01 ASSESSMENT — TONOMETRY
OD_IOP_MMHG: 16
OS_IOP_MMHG: 16
IOP_METHOD: APPLANATION

## 2017-12-01 ASSESSMENT — VISUAL ACUITY
OD_CC: 20/20
OS_CC: 20/20
METHOD: SNELLEN - LINEAR
OD_CC: J1
OS_CC: J1
CORRECTION_TYPE: GLASSES

## 2017-12-01 ASSESSMENT — SLIT LAMP EXAM - LIDS
COMMENTS: NORMAL
COMMENTS: NORMAL

## 2017-12-01 ASSESSMENT — CONF VISUAL FIELD
OD_NORMAL: 1
OS_NORMAL: 1

## 2017-12-01 ASSESSMENT — CUP TO DISC RATIO
OS_RATIO: 0.3
OD_RATIO: 0.25

## 2017-12-01 NOTE — PROGRESS NOTES
" Current Eye Medications: Naphcon A prn(for redness)     Subjective:  Comprehensive eye exam.  Pt reports she is seeing well, however about a week ago notice a floater in her right eye. She still has this floater but now it is smaller. Pt thinks she may have noticed some flashing, but not entirely sure.     Objective:  See Ophthalmology Exam.      Assessment:  Lyn Santana is a 57 year old female who presents with:     Posterior vitreous detachment of right eye        Plan:  Glasses prescription given - optional   Use artificial tears up to 4 times per day (Refresh Plus, Systane Balance, or generic artificial tears are ok. Avoid \"get the red out\" drops).     Mitra Griffiths MD  [phone]           "

## 2017-12-01 NOTE — PATIENT INSTRUCTIONS
"Glasses prescription given - optional   Possible posterior vitreous detachment (sudden onset large floater and/or flashing lights) discussed. Left eye   Use artificial tears up to 4 times per day (Refresh Plus, Systane Balance, or generic artificial tears are ok. Avoid \"get the red out\" drops).     Mitra Griffiths MD  [phone]    "

## 2017-12-01 NOTE — MR AVS SNAPSHOT
"              After Visit Summary   12/1/2017    Lyn Santana    MRN: [id number]           Patient Information     Date Of Birth          1960        Visit Information        Provider Department      12/1/2017 2:30 PM Mitra Griffiths MD [facility]        Today's Diagnoses     Posterior vitreous detachment of right eye    -  1    Presbyopia          Care Instructions    Glasses prescription given - optional   Possible posterior vitreous detachment (sudden onset large floater and/or flashing lights) discussed. Left eye   Use artificial tears up to 4 times per day (Refresh Plus, Systane Balance, or generic artificial tears are ok. Avoid \"get the red out\" drops).     Mitra Griffiths MD  [phone]            Follow-ups after your visit        Follow-up notes from your care team     Return in about 1 year (around 12/1/2018) for Complete Exam.      Who to contact     If you have questions or need follow up information about today's clinic visit or your schedule please contact [facility] directly at [phone].  Normal or non-critical lab and imaging results will be communicated to you by [employer], letter or phone within 4 business days after the clinic has received the results. If you do not hear from us within 7 days, please contact the clinic through [employer] or phone. If you have a critical or abnormal lab result, we will notify you by phone as soon as possible.  Submit refill requests through [employer] or call your pharmacy and they will forward the refill request to us. Please allow 3 business days for your refill to be completed.          Additional Information About Your Visit        MyChart Information     [employer] gives you secure access to your electronic health record. If you see a primary care provider, you can also send messages to your care team and make appointments. If you have questions, please call your primary care clinic.  If you do not have a primary care " provider, please call [phone] and they will assist you.        Care EveryWhere ID     This is your Care EveryWhere ID. This could be used by other organizations to access your [place] medical records  [id number]         Blood Pressure from Last 3 Encounters:   04/24/17 128/70   03/02/17 124/78   01/03/17 128/78    Weight from Last 3 Encounters:   04/24/17 54 kg (119 lb)   03/02/17 57.2 kg (126 lb)   01/03/17 60.8 kg (134 lb)              We Performed the Following     EYE EXAM (SIMPLE-NONBILLABLE)     REFRACTIVE STATUS        Primary Care Provider Office Phone # Fax #    Ez Corbett [phone]       [facility] [address]        Equal Access to Services     AMITA SQUIRES : Hadii aad ku hadasho Somauricio, waaxda luqadaha, qaybta kaalmada adeegyada, trang iglesias . So [facility] [phone].    ATENCIÓN: Si habla español, tiene a day disposición servicios gratuitos de asistencia lingüística. [place] al [phone].    We comply with applicable federal civil rights laws and [place] laws. We do not discriminate on the basis of race, color, national origin, age, disability, sex, sexual orientation, or gender identity.            Thank you!     Thank you for choosing [facility]  for your care. Our goal is always to provide you with excellent care. Hearing back from our patients is one way we can continue to improve our services. Please take a few minutes to complete the written survey that you may receive in the mail after your visit with us. Thank you!             Your Updated Medication List - Protect others around you: Learn how to safely use, store and throw away your medicines at www.disposemymeds.org.          This list is accurate as of: 12/1/17  3:52 PM.  Always use your most recent med list.                   Brand Name Dispense Instructions for use Diagnosis    busPIRone 5 MG tablet    BUSPAR    120 tablet    Start at 5 mg  twice daily for 3 days, , then 10 mg (2 tabs) twice daily for 3 days, then 12.5 mg (2.5 tabs) twice daily for 3 days, then 15 mg (3 tabs) twice daily and stay at that dose    Major depressive disorder, single episode, mild (H)       MULTIVITAMINS PO      Take by mouth daily        sertraline 25 MG tablet    ZOLOFT    135 tablet    Take 1.5 tablets (37.5 mg) by mouth daily    Anxiety, Major depressive disorder, recurrent episode, mild (H)       traZODone 50 MG tablet    DESYREL    60 tablet    Take 1 tablet (50 mg) by mouth nightly as needed for sleep    Primary insomnia       vitamin D 2000 UNITS tablet     100 tablet    Take 2,000 Units by mouth 2 times daily    Anxiety, Mild major depression (H)

## 2017-12-04 ENCOUNTER — MYC MEDICAL ADVICE (OUTPATIENT)
Dept: FAMILY MEDICINE | Facility: CLINIC | Age: 57
End: 2017-12-04

## 2017-12-04 NOTE — TELEPHONE ENCOUNTER
See bote sent to patient. Please call and make follow up appt.      We may change. It would be good to review. I will have our team call to set up an appointment. This could be either a phone encounter or an office visit.  There is also new testing that can be done based on genetic that helps with medication selection. We cannot do it here but I could refer.     Taqueria Corbett MD

## 2017-12-12 ENCOUNTER — VIRTUAL VISIT (OUTPATIENT)
Dept: FAMILY MEDICINE | Facility: CLINIC | Age: 57
End: 2017-12-12
Payer: COMMERCIAL

## 2017-12-12 DIAGNOSIS — F32.0 MAJOR DEPRESSIVE DISORDER, SINGLE EPISODE, MILD (H): ICD-10-CM

## 2017-12-12 PROCEDURE — 99441 ZZC PHYSICIAN TELEPHONE EVALUATION 5-10 MIN: CPT | Performed by: FAMILY MEDICINE

## 2017-12-12 RX ORDER — BUSPIRONE HYDROCHLORIDE 5 MG/1
TABLET ORAL
Qty: 120 TABLET | Refills: 3 | Status: SHIPPED | OUTPATIENT
Start: 2017-12-12 | End: 2018-01-26 | Stop reason: DRUGHIGH

## 2017-12-12 ASSESSMENT — PATIENT HEALTH QUESTIONNAIRE - PHQ9
5. POOR APPETITE OR OVEREATING: NEARLY EVERY DAY
SUM OF ALL RESPONSES TO PHQ QUESTIONS 1-9: 14

## 2017-12-12 ASSESSMENT — ANXIETY QUESTIONNAIRES
1. FEELING NERVOUS, ANXIOUS, OR ON EDGE: NEARLY EVERY DAY
2. NOT BEING ABLE TO STOP OR CONTROL WORRYING: NEARLY EVERY DAY
5. BEING SO RESTLESS THAT IT IS HARD TO SIT STILL: SEVERAL DAYS
7. FEELING AFRAID AS IF SOMETHING AWFUL MIGHT HAPPEN: SEVERAL DAYS
3. WORRYING TOO MUCH ABOUT DIFFERENT THINGS: NEARLY EVERY DAY
6. BECOMING EASILY ANNOYED OR IRRITABLE: SEVERAL DAYS
GAD7 TOTAL SCORE: 15

## 2017-12-12 NOTE — PROGRESS NOTES
"Lyn Santana is a 57 year old female who is being evaluated via a telephone visit.      The patient has been notified of following:     \"This telephone visit will be conducted via a call between you and your physician/provider. We have found that certain health care needs can be provided without the need for a physical exam.  This service lets us provide the care you need with a short phone conversation.  If a prescription is necessary we can send it directly to your pharmacy.  If lab work is needed we can place an order for that and you can then stop by our lab to have the test done at a later time.    We will bill your insurance company for this service.  Please check with your medical insurance if this type of visit is covered. You may be responsible for the cost of this type of visit if insurance coverage is denied.  The typical cost is $30 (10min), $59(11-20min) and $85 (21-30min).  Most often these visits are shorter than 10 minutes.    If during the course of the call the physician/provider feels a telephone visit is not appropriate, you will not be charged for this service. \"     Consent has been obtained for this service by 1 care team member:yes. See the scanned image in the medical record.    Preferred patient phone number to be used for this call: [phone]     Lyn Santana complains of  Depression      Past Medical History:   Diagnosis Date     Depressive disorder Dec. 2013    Have been taking an antidepressant since Jan. 2015; OK now      Social History     Social History     Marital status:      Spouse name: N/A     Number of children: N/A     Years of education: N/A     Occupational History     Not on file.     Social History Main Topics     Smoking status: Former Smoker     Packs/day: 2.00     Years: 12.00     Types: Cigarettes     Start date: 6/1/1971     Quit date: 6/23/1984     Smokeless tobacco: Never Used     Alcohol use Yes      Comment: 4-6 per week     Drug use: No     " "Sexual activity: Yes     Partners: Male     Birth control/ protection: Post-menopausal     Other Topics Concern     Parent/Sibling W/ Cabg, Mi Or Angioplasty Before 65f 55m? No     Social History Narrative     ALLERGIES  Review of patient's allergies indicates no known allergies.        Kendra Freitas, Certified Medical Assistant (AAMA)  (MA signature)    Additional provider notes:patient does not feel SSRI is helping. At this time does not feel it may have \"ever helped\"   She did not start Buspar. She did not remember it being recommended or ordered. She has been on wellbutrin in the past but stopped  Working. We discussed medication option and possible evaluation by psych and possible genetic testing for medications.   Primary concern is depression. She is not sucidal.   Assessment/Plan:    (F32.0) Major depressive disorder, single episode, mild (H)  Comment: difficulty with medications.   Plan:trial  busPIRone (BUSPAR) 5 MG tablet, MENTAL HEALTH         REFERRAL  - Adult; Psychiatry and Medication         Management; Psychiatry; [facility]: Psychiatry Clinic         [phone]; We will contact you to schedule        the appointment or please call with any         questions,       Total time spent on this phone visit with the patient = 5 minutes     I have reviewed the note as documented above.  This accurately captures the substance of my conversation with the patient,  Lyn Santana      "

## 2017-12-12 NOTE — MR AVS SNAPSHOT
After Visit Summary   12/12/2017    Lyn Santana    MRN: [id number]           Patient Information     Date Of Birth          1960        Visit Information        Provider Department      12/12/2017 9:40 AM Ez Corbett MD [facility]        Today's Diagnoses     Major depressive disorder, single episode, mild (H)           Follow-ups after your visit        Additional Services     MENTAL HEALTH REFERRAL  - Adult; Psychiatry and Medication Management; Psychiatry; [facility]: Psychiatry Clinic [phone]; We will contact you to schedule the appointment or please call with any questions       All scheduling is subject to the client's specific insurance plan & benefits, provider/location availability, and provider clinical specialities.  Please arrive 15 minutes early for your first appointment and bring your completed paperwork.    Please be aware that coverage of these services is subject to the terms and limitations of your health insurance plan.  Call member services at your health plan with any benefit or coverage questions.  Recommend Dr Gonzalez to review possible genetic testing for medication management                  Who to contact     If you have questions or need follow up information about today's clinic visit or your schedule please contact [facility] [place] directly at [phone].  Normal or non-critical lab and imaging results will be communicated to you by MyChart, letter or phone within 4 business days after the clinic has received the results. If you do not hear from us within 7 days, please contact the clinic through MyChart or phone. If you have a critical or abnormal lab result, we will notify you by phone as soon as possible.  Submit refill requests through [employer] or call your pharmacy and they will forward the refill request to us. Please allow 3 business days for your refill to be completed.          Additional Information About Your  Visit        [employer] Information     [employer] gives you secure access to your electronic health record. If you see a primary care provider, you can also send messages to your care team and make appointments. If you have questions, please call your primary care clinic.  If you do not have a primary care provider, please call [phone] and they will assist you.        Care EveryWhere ID     This is your Care EveryWhere ID. This could be used by other organizations to access your [facility] medical records  [id number]         Blood Pressure from Last 3 Encounters:   04/24/17 128/70   03/02/17 124/78   01/03/17 128/78    Weight from Last 3 Encounters:   04/24/17 119 lb (54 kg)   03/02/17 126 lb (57.2 kg)   01/03/17 134 lb (60.8 kg)              We Performed the Following     MENTAL HEALTH REFERRAL  - Adult; Psychiatry and Medication Management; Psychiatry; [facility]: Psychiatry Clinic [phone]; We will contact you to schedule the appointment or please call with any questions          Today's Medication Changes          These changes are accurate as of: 12/12/17  4:57 PM.  If you have any questions, ask your nurse or doctor.               These medicines have changed or have updated prescriptions.        Dose/Directions    busPIRone 5 MG tablet   Commonly known as:  BUSPAR   This may have changed:  additional instructions   Used for:  Major depressive disorder, single episode, mild (H)   Changed by:  Ez Corbett MD        Start at 5 mg twice daily for 3 days, , then 10 mg (2 tabs) twice daily for 3 days,then 15 mg (3 tabs) twice daily and stay at that dose   Quantity:  120 tablet   Refills:  3            Where to get your medicines      These medications were sent to [id number] - [place], [place] - [address] AT [facility] [address] [place] [address]     Phone:  [phone]     busPIRone 5 MG tablet                Primary Care Provider Office  Phone # Fax #    Ez Corbett [phone] [address] [facility] [address]        Equal Access to Services     AMITA SQUIRES : Vamsi Jaquez, wacarlitada luqadaha, qaybta kaalmada leilaterrell, trang juanitoin hayaateto fossjohnathonglo stover. So [facility] [phone].    ATENCIÓN: Si habla español, tiene a day disposición servicios gratuitos de asistencia lingüística. Llame al [phone].    We comply with applicable federal civil rights laws and [place] laws. We do not discriminate on the basis of race, color, national origin, age, disability, sex, sexual orientation, or gender identity.            Thank you!     Thank you for choosing [facility]  for your care. Our goal is always to provide you with excellent care. Hearing back from our patients is one way we can continue to improve our services. Please take a few minutes to complete the written survey that you may receive in the mail after your visit with us. Thank you!             Your Updated Medication List - Protect others around you: Learn how to safely use, store and throw away your medicines at www.disposemymeds.org.          This list is accurate as of: 12/12/17  4:57 PM.  Always use your most recent med list.                   Brand Name Dispense Instructions for use Diagnosis    busPIRone 5 MG tablet    BUSPAR    120 tablet    Start at 5 mg twice daily for 3 days, , then 10 mg (2 tabs) twice daily for 3 days,then 15 mg (3 tabs) twice daily and stay at that dose    Major depressive disorder, single episode, mild (H)       MULTIVITAMINS PO      Take by mouth daily        sertraline 25 MG tablet    ZOLOFT    135 tablet    Take 1.5 tablets (37.5 mg) by mouth daily    Anxiety, Major depressive disorder, recurrent episode, mild (H)       traZODone 50 MG tablet    DESYREL    60 tablet    Take 1 tablet (50 mg) by mouth nightly as needed for sleep    Primary insomnia

## 2017-12-13 ASSESSMENT — ANXIETY QUESTIONNAIRES: GAD7 TOTAL SCORE: 15

## 2018-01-04 ENCOUNTER — OFFICE VISIT (OUTPATIENT)
Dept: PSYCHIATRY | Facility: CLINIC | Age: 58
End: 2018-01-04
Attending: NURSE PRACTITIONER
Payer: COMMERCIAL

## 2018-01-04 VITALS
SYSTOLIC BLOOD PRESSURE: 112 MMHG | BODY MASS INDEX: 21.28 KG/M2 | HEART RATE: 92 BPM | DIASTOLIC BLOOD PRESSURE: 71 MMHG | WEIGHT: 110.8 LBS

## 2018-01-04 DIAGNOSIS — F32.0 MAJOR DEPRESSIVE DISORDER, SINGLE EPISODE, MILD (H): ICD-10-CM

## 2018-01-04 PROCEDURE — G0463 HOSPITAL OUTPT CLINIC VISIT: HCPCS | Mod: ZF

## 2018-01-04 RX ORDER — DESVENLAFAXINE 25 MG/1
25 TABLET, EXTENDED RELEASE ORAL DAILY
Qty: 30 TABLET | Refills: 0 | Status: SHIPPED | OUTPATIENT
Start: 2018-01-04 | End: 2018-02-09

## 2018-01-04 ASSESSMENT — PATIENT HEALTH QUESTIONNAIRE - PHQ9: SUM OF ALL RESPONSES TO PHQ QUESTIONS 1-9: 16

## 2018-01-04 NOTE — MR AVS SNAPSHOT
After Visit Summary   1/4/2018    Lyn Santana    MRN: [id number]           Patient Information     Date Of Birth          1960        Visit Information        Provider Department      1/4/2018 2:00 PM Verónica Leo APRN CNP Psychiatry Clinic        Today's Diagnoses     Major depressive disorder, single episode, mild (H)           Follow-ups after your visit        Follow-up notes from your care team     Return in about 4 weeks (around 2/1/2018), or if symptoms worsen or fail to improve, for Routine Visit.      Your next 10 appointments already scheduled     Feb 09, 2018  3:30 PM CST   Adult Med Follow UP with TYRONE Ashby CNP   Psychiatry Clinic ([facility])    [address] [facility] [address] [phone]              Who to contact     Please call your clinic at [phone] to:    Ask questions about your health    Make or cancel appointments    Discuss your medicines    Learn about your test results    Speak to your doctor   If you have compliments or concerns about an experience at your clinic, or if you wish to file a complaint, please contact [facility] Patient Relations at [phone] or email us at [email or web address]         Additional Information About Your Visit        MyChart Information     [employer] gives you secure access to your electronic health record. If you see a primary care provider, you can also send messages to your care team and make appointments. If you have questions, please call your primary care clinic.  If you do not have a primary care provider, please call [phone] and they will assist you.      [employer] is an electronic gateway that provides easy, online access to your medical records. With [employer], you can request a clinic appointment, read your test results, renew a prescription or communicate with your care team.     To access your  existing account, please contact your [facility] or call [phone] for assistance.        Care EveryWhere ID     This is your Care EveryWhere ID. This could be used by other organizations to access your [facility] medical records  [id number]        Your Vitals Were     Pulse BMI (Body Mass Index)                92 21.28 kg/m2           Blood Pressure from Last 3 Encounters:   01/04/18 112/71   04/24/17 128/70   03/02/17 124/78    Weight from Last 3 Encounters:   01/04/18 50.3 kg (110 lb 12.8 oz)   04/24/17 54 kg (119 lb)   03/02/17 57.2 kg (126 lb)              Today, you had the following     No orders found for display         Today's Medication Changes          These changes are accurate as of: 1/4/18 11:59 PM.  If you have any questions, ask your nurse or doctor.               Start taking these medicines.        Dose/Directions    desvenlafaxine succinate 25 MG 24 hr tablet   Commonly known as:  PRISTIQ   Used for:  Major depressive disorder, single episode, mild (H)   Started by:  Verónica Leo APRN CNP        Dose:  25 mg   Take 1 tablet (25 mg) by mouth daily   Quantity:  30 tablet   Refills:  0            Where to get your medicines      These medications were sent to [id number] - [place], [place] - [address] AT [facility] [address]  [facility] [address] [place] [address]     Phone:  [phone]     desvenlafaxine succinate 25 MG 24 hr tablet                Primary Care Provider Office Phone # Fax #    Ez Corbett [phone]       [facility] [facility] [address]        Equal Access to Services     AMITA SQUIRES : Hadii brenda Jaquez, wacarlitada luqadaha, qaybta kaalmada taj, trang stover. So [facility] [phone].    ATENCIÓN: Si habla español, tiene a day disposición servicios gratuitos de asistencia lingüística. Llame al [phone].    We comply  with applicable federal civil rights laws and [place] laws. We do not discriminate on the basis of race, color, national origin, age, disability, sex, sexual orientation, or gender identity.            Thank you!     Thank you for choosing PSYCHIATRY CLINIC  for your care. Our goal is always to provide you with excellent care. Hearing back from our patients is one way we can continue to improve our services. Please take a few minutes to complete the written survey that you may receive in the mail after your visit with us. Thank you!             Your Updated Medication List - Protect others around you: Learn how to safely use, store and throw away your medicines at www.disposemymeds.org.          This list is accurate as of: 1/4/18 11:59 PM.  Always use your most recent med list.                   Brand Name Dispense Instructions for use Diagnosis    busPIRone 5 MG tablet    BUSPAR    120 tablet    Start at 5 mg twice daily for 3 days, , then 10 mg (2 tabs) twice daily for 3 days,then 15 mg (3 tabs) twice daily and stay at that dose    Major depressive disorder, single episode, mild (H)       desvenlafaxine succinate 25 MG 24 hr tablet    PRISTIQ    30 tablet    Take 1 tablet (25 mg) by mouth daily    Major depressive disorder, single episode, mild (H)       MULTIVITAMINS PO      Take by mouth daily        sertraline 25 MG tablet    ZOLOFT    135 tablet    Take 1.5 tablets (37.5 mg) by mouth daily    Anxiety, Major depressive disorder, recurrent episode, mild (H)       traZODone 50 MG tablet    DESYREL    60 tablet    Take 1 tablet (50 mg) by mouth nightly as needed for sleep    Primary insomnia

## 2018-01-04 NOTE — NURSING NOTE
Chief Complaint   Patient presents with     Eval/Assessment     Major depressive disorder     Reviewed allergies, smoking status, and pharmacy preference  Administered abuse screening questions   Obtained weight, blood pressure and heart rate
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
## 2018-01-26 ENCOUNTER — MYC MEDICAL ADVICE (OUTPATIENT)
Dept: FAMILY MEDICINE | Facility: CLINIC | Age: 58
End: 2018-01-26

## 2018-01-26 DIAGNOSIS — F32.0 MAJOR DEPRESSIVE DISORDER, SINGLE EPISODE, MILD (H): ICD-10-CM

## 2018-01-26 DIAGNOSIS — F51.01 PRIMARY INSOMNIA: ICD-10-CM

## 2018-01-26 RX ORDER — BUSPIRONE HYDROCHLORIDE 15 MG/1
15 TABLET ORAL 2 TIMES DAILY
Qty: 180 TABLET | Refills: 3 | Status: SHIPPED | OUTPATIENT
Start: 2018-01-26 | End: 2018-03-23

## 2018-01-26 RX ORDER — TRAZODONE HYDROCHLORIDE 50 MG/1
50 TABLET, FILM COATED ORAL AT BEDTIME
Qty: 60 TABLET | Refills: 5 | Status: SHIPPED | OUTPATIENT
Start: 2018-01-26 | End: 2018-03-23

## 2018-01-26 NOTE — TELEPHONE ENCOUNTER
Orders Placed This Encounter     traZODone (DESYREL) 50 MG tablet     Sig: Take 1 tablet (50 mg) by mouth At Bedtime     Dispense:  60 tablet     Refill:  5     busPIRone (BUSPAR) 15 MG tablet     Sig: Take 1 tablet (15 mg) by mouth 2 times daily     Dispense:  180 tablet     Refill:  3

## 2018-01-29 ENCOUNTER — FCC EXTENDED DOCUMENTATION (OUTPATIENT)
Dept: BEHAVIORAL HEALTH | Facility: CLINIC | Age: 58
End: 2018-01-29

## 2018-01-29 NOTE — PROGRESS NOTES
Discharge Summary  Multiple Sessions    Client Name: Lyn Santana MRN#: [id number] YOB: 1960      Intake / Discharge Date: 5/8/17  -  1/29/2018       DSM5 Diagnoses: (Sustained by DSM5 Criteria Listed Above)  DSM5 Diagnoses: (Sustained by DSM5 Criteria Listed Above)  Diagnoses: 296.31 Major Depressive Disorder, Recurrent Episode, Mild _  300.02 (F41.1) Generalized Anxiety Disorder  Psychosocial & Contextual Factors: good social support  WHODAS 2.0 (12 item)            This questionnaire asks about difficulties due to health conditions. Health conditions  include  disease or illnesses, other health problems that may be short or long lasting,  injuries, mental health or emotional problems, and problems with alcohol or drugs.                     Think back over the past 30 days and answer these questions, thinking about how much  difficulty you had doing the following activities. For each question, please [place] only  one response.    S1 Standing for long periods such as 30 minutes? None =         1   S2 Taking care of household responsibilities? Moderate =   3   S3 Learning a new task, for example, learning how to get to a new place? Mild =           2   S4 How much of a problem do you have joining community activities (for example, festivals, Mandaeism or other activities) in the same way as anyone else can? Severe =       4   S5 How much have you been emotionally affected by your health problems? Severe =       4     In the past 30 days, how much difficulty did you have in:   S6 Concentrating on doing something for ten minutes? Moderate =   3   S7 Walking a long distance such as a kilometer (or equivalent)? None =         1   S8 Washing your whole body? None =         1   S9 Getting dressed? Mild =           2   S10 Dealing with people you do not know? Moderate =   3   S11 Maintaining a friendship? Severe =       4   S12 Your day to day work? Severe =       4     H1 Overall, in  the past 30 days, how many days were these difficulties present? Record number of days 20   H2 In the past 30 days, for how many days were you totally unable to carry out your usual activities or work because of any health condition? Record number of days  0   H3 In the past 30 days, not counting the days that you were totally unable, for how many days did you cut back or reduce your usual activities or work because of any health condition? Record number of days 15             Presenting Concern:  anxiety      Reason for Discharge:  Client did not return      Disposition at Time of Last Encounter:   Comments:   na     Risk Management:   Client denies a history of suicidal ideation, suicide attempts, self-injurious behavior, homicidal ideation, homicidal behavior and and other safety concerns  A safety and risk management plan has not been developed at this time, however client was given the after-hours number / 911 should there be a change in any of these risk factors.      Referred To:  PCP.  Client can return to [facility] at any point in the future if desired.         Randy Contreras, LICSW   1/29/2018

## 2018-02-05 ENCOUNTER — MYC MEDICAL ADVICE (OUTPATIENT)
Dept: FAMILY MEDICINE | Facility: CLINIC | Age: 58
End: 2018-02-05

## 2018-02-05 NOTE — LETTER
[address] [address]  [phone]          February 7, 2018      RE: Lyn Santana                                                                       To Whom it May Concern:    Lyn Santana is a patient of mine at [facility] clinic. I have been seeing her here since 2012. I have treated her for anxiety and depression and she has responded well and has been very stable with the treatment given. She is asking me to write this letter in appeal to her denial of medical insurance. I am not sure why she was denied and given the it is illegal to deny based on preexisting conditions.  I would ask you to review and reconsider her application for insurance.             Sincerely,    Ez Corbett MD

## 2018-02-07 NOTE — TELEPHONE ENCOUNTER
Letter in completed folder   Please call patient and have her pick it up and also review.    Taqueria Corbett MD

## 2018-02-09 ENCOUNTER — OFFICE VISIT (OUTPATIENT)
Dept: PSYCHIATRY | Facility: CLINIC | Age: 58
End: 2018-02-09
Attending: NURSE PRACTITIONER
Payer: COMMERCIAL

## 2018-02-09 VITALS
BODY MASS INDEX: 20.98 KG/M2 | DIASTOLIC BLOOD PRESSURE: 80 MMHG | HEART RATE: 101 BPM | WEIGHT: 109.2 LBS | SYSTOLIC BLOOD PRESSURE: 132 MMHG

## 2018-02-09 DIAGNOSIS — F33.1 MAJOR DEPRESSIVE DISORDER, RECURRENT EPISODE, MODERATE (H): Primary | ICD-10-CM

## 2018-02-09 PROCEDURE — G0463 HOSPITAL OUTPT CLINIC VISIT: HCPCS | Mod: ZF

## 2018-02-09 RX ORDER — VENLAFAXINE HYDROCHLORIDE 37.5 MG/1
CAPSULE, EXTENDED RELEASE ORAL
Qty: 30 CAPSULE | Refills: 0 | Status: SHIPPED | OUTPATIENT
Start: 2018-02-09 | End: 2018-02-26

## 2018-02-09 ASSESSMENT — PAIN SCALES - GENERAL: PAINLEVEL: NO PAIN (0)

## 2018-02-09 NOTE — PROGRESS NOTES
"  Outpatient Psychiatry Progress Note     Provider: TYRONE Boss CNP  Date: 2018  Service:  Medication management.   Patient Identification: Lyn Santana  : 1960   MRN: [id number]    Lyn Santana is a 57 year old female who presents for ongoing psychiatric care.  Lyn was last seen in clinic on 2018 for a psychiatric evaluation. At that time, she chose to continue Zoloft 25mg daily, buspar 15mg BID, trazodone 50mg QHS PRN and trial Pristiq 25mg daily.     She presents alone and on time.    2018  Today Lyn reports she is OK.  - she feels more energetic but still feels depressed  - she is focused on trying to be more social and interactive.  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 [employer] faith  - considers applying for a new job in the [employer]  - recalls MDD worsened in  and one brief period since then of respite from chronic depression    Compliance: daily  Side effects: denies    Psychiatric Review of Systems:  Depression: she feels down and depressed, persistent sense of worthlessness; describes herself as \"grouchy\" with her 4yo grandson \"Jair\"  Anxiety: anxious about finances, insurance, her job    Lethality: denies    Review of Medical Systems:  Appetite: minor weight loss since psych eval in 2018 but 10# loss since 2017  Sleep: sleeping better with Pristiq, with trazodone, averaging 1030p-6a, can wake intermittently at 3a or 5a  Self Care: encouraged, traveled with a friend in  to [place] to find a particular bird; she used to enjoy bird watching, baking, cooking  Housework and hygiene: managing as is normal for her  Energy: " adequate  Concentration: intact    Current Substance Use:    Social History     Social History     Marital status:      Spouse name: N/A     Number of children: N/A     Years of education: N/A     Social History Main Topics     Smoking status: Former Smoker     Packs/day: 2.00     Years: 12.00     Types: Cigarettes     Start date: 6/1/1971     Quit date: 6/23/1984     Smokeless tobacco: Never Used     Alcohol use Yes      Comment: 4-6 per week     Drug use: No     Sexual activity: Yes     Partners: Male     Birth control/ protection: Post-menopausal     Other Topics Concern     Parent/Sibling W/ Cabg, Mi Or Angioplasty Before 65f 55m? No     Social History Narrative     Limits coffee, prefers red wine and cider but limits 1-2x week    Past Medical History:   Past Medical History:   Diagnosis Date     Depressive disorder Dec. 2013    Have been taking an antidepressant since Jan. 2015; OK now     Medical health update: none today    Allergies: No Known Allergies       Current Medications     Current Outpatient Prescriptions Ordered in [facility]   Medication Sig Dispense Refill     traZODone (DESYREL) 50 MG tablet Take 1 tablet (50 mg) by mouth At Bedtime 60 tablet 5     busPIRone (BUSPAR) 15 MG tablet Take 1 tablet (15 mg) by mouth 2 times daily 180 tablet 3     desvenlafaxine succinate (PRISTIQ) 25 MG 24 hr tablet Take 1 tablet (25 mg) by mouth daily 30 tablet 0     sertraline (ZOLOFT) 25 MG tablet Take 1.5 tablets (37.5 mg) by mouth daily 135 tablet 3     Multiple Vitamin (MULTIVITAMINS PO) Take by mouth daily       No current [facility]-ordered facility-administered medications on file.       Mental Status Exam     Appearance:  Formally dressed, Well groomed, Dressed appropriately for weather and Appears stated age  Behavior/relationship to examiner/demeanor:  Cooperative, Engaged and Pleasant  Orientation: Oriented to person, place, time and situation  Psychomotor: WNL  Speech Rate:  Normal  Speech Spontaneity:   Normal  Mood:  A bit better  Affect:  Appropriate/mood-congruent and Restricted  Thought Process (Associations):  Logical, Linear and Goal directed  Thought Content:  no evidence of suicidal or homicidal ideation, no overt psychosis, denies suicidal ideation, intent or thoughts, patient denies auditory and visual hallucinations, patient does not appear to be responding to internal stimuli and denies suicidal intent or plan  Abnormal Perception:  None  Attention/Concentration:  Normal  Language:  Intact  Insight:  Limited  Judgment:  Good      Results     Vital signs: /80  Pulse 101  Wt 49.5 kg (109 lb 3.2 oz)  BMI 20.98 kg/m2    Laboratory Data:   Lab Results   Component Value Date    WBC 6.5 09/11/2012    HGB 14.9 09/11/2012    HCT 44.1 09/11/2012     09/11/2012    CHOL 222 (H) 04/10/2017    TRIG 53 04/10/2017     04/10/2017     04/10/2017    BUN 23 04/10/2017    CO2 29 04/10/2017    TSH 2.22 04/10/2017       Assessment & Plan     Lyn is seen today for follow up.  - encouraged exercise, good nutrition, activities she enjoys  - encouraged her to consider applying for jobs that interest her  - monitor weight  - discussed mood in context of chronic stress     Diagnosis  Axis 1: MDD, recurrent, moderate  Axis 2: none     Plan:   Medication: continue Zoloft 25mg daily, buspar 15mg BID, trazodone 50mg QHS PRN (has all); stop Pristiq 25mg daily, start Effexor XR 37.5mg QAM (needs)  OTC Recommendations: none  Lab Orders: none  Referrals: none  Release of Information: none  Future Treatment Considerations: monitor ASE including sweating, potential taper off SSRI if Effexor is tolerated and effective, she is monitoring costs of rx, she may want 90 day fill at [facility] if med is effective and tolerated  Return for Follow Up: 2/27/18, sooner as needed     Lyn voiced understanding of the treatment plan including discussion of options, risks/ benefits. Lyn has clinic contact information and  will seek emergency services if urgent or life threatening symptoms present. She understands risks associated with street drugs or alcohol.    PHQ-9 score:  10  PHQ-9 SCORE 1/4/2018   Total Score -   Total Score MyChart -   Total Score 16     GAD7:   LEONOR-7 SCORE 4/24/2017 5/8/2017 12/12/2017   Total Score - - -   Total Score 14 5 15     : 01/2017  Verónica Leo, APRN CNP 2/9/2018

## 2018-02-09 NOTE — TELEPHONE ENCOUNTER
An Item was picked up at the [facility] :    Date it was picked up? 02/09/2018     What was picked up?  Envelope/Letter    Who picked them up?  Lyn Santana    Relationship to patient? patient    Did they show ID?  Yes    Was it logged in book? Yes    Who gave it to the patient?  Cathy Williamson, Patient Representative

## 2018-02-09 NOTE — MR AVS SNAPSHOT
After Visit Summary   2/9/2018    Lyn Santana    MRN: [id number]           Patient Information     Date Of Birth          1960        Visit Information        Provider Department      2/9/2018 12:00 PM Verónica Leo APRN CNP Psychiatry Clinic        Today's Diagnoses     Major depressive disorder, recurrent episode, moderate (H)    -  1       Follow-ups after your visit        Follow-up notes from your care team     Return in about 3 weeks (around 3/2/2018), or if symptoms worsen or fail to improve, for Routine Visit, BP Recheck.      Your next 10 appointments already scheduled     Feb 27, 2018  3:30 PM CST   Adult Med Follow UP with YTRONE Ashby CNP   Psychiatry Clinic ([facility])    [place] [id number] [facility] [address] [phone]              Who to contact     Please call your clinic at [phone] to:    Ask questions about your health    Make or cancel appointments    Discuss your medicines    Learn about your test results    Speak to your doctor            Additional Information About Your Visit        [employer] Information     [employer] gives you secure access to your electronic health record. If you see a primary care provider, you can also send messages to your care team and make appointments. If you have questions, please call your primary care clinic.  If you do not have a primary care provider, please call [phone] and they will assist you.      [employer] is an electronic gateway that provides easy, online access to your medical records. With [employer], you can request a clinic appointment, read your test results, renew a prescription or communicate with your care team.     To access your existing account, please contact your [facility] or call [phone] for assistance.        Care EveryWhere ID     This is your Care EveryWhere ID. This could be used by other organizations to  access your [facility] medical records  [id number]        Your Vitals Were     Pulse BMI (Body Mass Index)                101 20.98 kg/m2           Blood Pressure from Last 3 Encounters:   02/09/18 132/80   01/04/18 112/71   04/24/17 128/70    Weight from Last 3 Encounters:   02/09/18 49.5 kg (109 lb 3.2 oz)   01/04/18 50.3 kg (110 lb 12.8 oz)   04/24/17 54 kg (119 lb)              Today, you had the following     No orders found for display         Today's Medication Changes          These changes are accurate as of 2/9/18 11:59 PM.  If you have any questions, ask your nurse or doctor.               Start taking these medicines.        Dose/Directions    venlafaxine 37.5 MG 24 hr capsule   Commonly known as:  EFFEXOR-XR   Used for:  Major depressive disorder, recurrent episode, moderate (H)   Started by:  Verónica Leo APRN CNP        Take 1 capsule daily   Quantity:  30 capsule   Refills:  0         Stop taking these medicines if you haven't already. Please contact your care team if you have questions.     desvenlafaxine succinate 25 MG 24 hr tablet   Commonly known as:  PRISTIQ   Stopped by:  Verónica Leo APRN CNP                Where to get your medicines      These medications were sent to [id number] - [place], [place] - [address]  AT [facility] [address]  [facility] [address], [place] [address]     Phone:  [phone]     venlafaxine 37.5 MG 24 hr capsule                Primary Care Provider Office Phone # Fax #    Ez Corbett [phone]       [facility] [address]        Equal Access to Services     AMITA SQUIRES AH: Hadii brenda sumner Somauricio, waaxda luqadaha, qaybta kaalmada adeegyada, trang stover. So [facility] [phone].    ATENCIÓN: Si habla español, tiene a day disposición servicios gratuitos de asistencia lingüística. Llame al [phone].    We comply with applicable  federal civil rights laws and [place] laws. We do not discriminate on the basis of race, color, national origin, age, disability, sex, sexual orientation, or gender identity.            Thank you!     Thank you for choosing PSYCHIATRY CLINIC  for your care. Our goal is always to provide you with excellent care. Hearing back from our patients is one way we can continue to improve our services. Please take a few minutes to complete the written survey that you may receive in the mail after your visit with us. Thank you!             Your Updated Medication List - Protect others around you: Learn how to safely use, store and throw away your medicines at www.disposemymeds.org.          This list is accurate as of 2/9/18 11:59 PM.  Always use your most recent med list.                   Brand Name Dispense Instructions for use Diagnosis    busPIRone 15 MG tablet    BUSPAR    180 tablet    Take 1 tablet (15 mg) by mouth 2 times daily    Major depressive disorder, single episode, mild (H)       MULTIVITAMINS PO      Take by mouth daily        sertraline 25 MG tablet    ZOLOFT    135 tablet    Take 1.5 tablets (37.5 mg) by mouth daily    Anxiety, Major depressive disorder, recurrent episode, mild (H)       traZODone 50 MG tablet    DESYREL    60 tablet    Take 1 tablet (50 mg) by mouth At Bedtime    Primary insomnia       venlafaxine 37.5 MG 24 hr capsule    EFFEXOR-XR    30 capsule    Take 1 capsule daily    Major depressive disorder, recurrent episode, moderate (H)

## 2018-02-10 ASSESSMENT — PATIENT HEALTH QUESTIONNAIRE - PHQ9: SUM OF ALL RESPONSES TO PHQ QUESTIONS 1-9: 10

## 2018-02-26 DIAGNOSIS — F33.1 MAJOR DEPRESSIVE DISORDER, RECURRENT EPISODE, MODERATE (H): ICD-10-CM

## 2018-02-26 RX ORDER — VENLAFAXINE HYDROCHLORIDE 37.5 MG/1
CAPSULE, EXTENDED RELEASE ORAL
Qty: 30 CAPSULE | Refills: 0 | Status: SHIPPED | OUTPATIENT
Start: 2018-02-26 | End: 2018-03-23

## 2018-03-23 ENCOUNTER — OFFICE VISIT (OUTPATIENT)
Dept: PSYCHIATRY | Facility: CLINIC | Age: 58
End: 2018-03-23
Attending: NURSE PRACTITIONER
Payer: COMMERCIAL

## 2018-03-23 VITALS
WEIGHT: 108.6 LBS | SYSTOLIC BLOOD PRESSURE: 125 MMHG | BODY MASS INDEX: 20.86 KG/M2 | DIASTOLIC BLOOD PRESSURE: 81 MMHG | HEART RATE: 77 BPM

## 2018-03-23 DIAGNOSIS — F33.1 MAJOR DEPRESSIVE DISORDER, RECURRENT EPISODE, MODERATE (H): ICD-10-CM

## 2018-03-23 DIAGNOSIS — F51.01 PRIMARY INSOMNIA: ICD-10-CM

## 2018-03-23 DIAGNOSIS — F32.0 MAJOR DEPRESSIVE DISORDER, SINGLE EPISODE, MILD (H): ICD-10-CM

## 2018-03-23 DIAGNOSIS — F33.0 MAJOR DEPRESSIVE DISORDER, RECURRENT EPISODE, MILD (H): ICD-10-CM

## 2018-03-23 DIAGNOSIS — F41.9 ANXIETY: ICD-10-CM

## 2018-03-23 PROCEDURE — G0463 HOSPITAL OUTPT CLINIC VISIT: HCPCS | Mod: ZF

## 2018-03-23 RX ORDER — TRAZODONE HYDROCHLORIDE 50 MG/1
50 TABLET, FILM COATED ORAL AT BEDTIME
Qty: 90 TABLET | Refills: 0 | Status: SHIPPED | OUTPATIENT
Start: 2018-03-23 | End: 2018-06-22

## 2018-03-23 RX ORDER — VENLAFAXINE HYDROCHLORIDE 75 MG/1
CAPSULE, EXTENDED RELEASE ORAL
Qty: 90 CAPSULE | Refills: 0 | Status: SHIPPED | OUTPATIENT
Start: 2018-03-23 | End: 2018-06-22

## 2018-03-23 RX ORDER — BUSPIRONE HYDROCHLORIDE 10 MG/1
TABLET ORAL
Qty: 360 TABLET | Refills: 0 | Status: SHIPPED | OUTPATIENT
Start: 2018-03-23 | End: 2018-06-22

## 2018-03-23 RX ORDER — SERTRALINE HYDROCHLORIDE 25 MG/1
37.5 TABLET, FILM COATED ORAL DAILY
Qty: 90 TABLET | Refills: 0 | Status: SHIPPED | OUTPATIENT
Start: 2018-03-23 | End: 2018-06-22

## 2018-03-23 ASSESSMENT — PAIN SCALES - GENERAL: PAINLEVEL: NO PAIN (0)

## 2018-03-23 NOTE — MR AVS SNAPSHOT
After Visit Summary   3/23/2018    Lyn Santana    MRN: [id number]           Patient Information     Date Of Birth          1960        Visit Information        Provider Department      3/23/2018 3:00 PM Verónica Leo APRN CNP Psychiatry Clinic        Today's Diagnoses     Primary insomnia        Anxiety        Major depressive disorder, recurrent episode, mild (H)        Major depressive disorder, recurrent episode, moderate (H)        Major depressive disorder, single episode, mild (H)           Follow-ups after your visit        Follow-up notes from your care team     Return in about 4 weeks (around 4/20/2018) for Routine Visit.      Your next 10 appointments already scheduled     Jun 22, 2018  3:00 PM CDT   Adult Med Follow UP with TYRONE Ashby CNP   Psychiatry Clinic ([facility])    [place] [id number] [address] [phone]              Who to contact     Please call your clinic at [phone] to:    Ask questions about your health    Make or cancel appointments    Discuss your medicines    Learn about your test results    Speak to your doctor            Additional Information About Your Visit        MyChart Information     [employer] gives you secure access to your electronic health record. If you see a primary care provider, you can also send messages to your care team and make appointments. If you have questions, please call your primary care clinic.  If you do not have a primary care provider, please call [phone] and they will assist you.      [employer] is an electronic gateway that provides easy, online access to your medical records. With [employer], you can request a clinic appointment, read your test results, renew a prescription or communicate with your care team.     To access your existing account, please contact your [facility] or call [phone] for assistance.         Care EveryWhere ID     This is your Care EveryWhere ID. This could be used by other organizations to access your [facility] [facility] medical records  [id number]        Your Vitals Were     Pulse BMI (Body Mass Index)                77 20.86 kg/m2           Blood Pressure from Last 3 Encounters:   03/23/18 125/81   02/09/18 132/80   01/04/18 112/71    Weight from Last 3 Encounters:   03/23/18 49.3 kg (108 lb 9.6 oz)   02/09/18 49.5 kg (109 lb 3.2 oz)   01/04/18 50.3 kg (110 lb 12.8 oz)              Today, you had the following     No orders found for display         Today's Medication Changes          These changes are accurate as of 3/23/18 11:59 PM.  If you have any questions, ask your nurse or doctor.               These medicines have changed or have updated prescriptions.        Dose/Directions    busPIRone 10 MG tablet   Commonly known as:  BUSPAR   This may have changed:    - medication strength  - how much to take  - how to take this  - when to take this  - additional instructions   Used for:  Major depressive disorder, single episode, mild (H)   Changed by:  Verónica Leo APRN CNP        Take two 10mg tabs twice daily   Quantity:  360 tablet   Refills:  0       venlafaxine 75 MG 24 hr capsule   Commonly known as:  EFFEXOR-XR   This may have changed:  medication strength   Used for:  Major depressive disorder, recurrent episode, moderate (H)   Changed by:  Verónica Leo APRN CNP        Take 1 capsule daily   Quantity:  90 capsule   Refills:  0            Where to get your medicines      These medications were sent to [id number] - [place], [place] - [address] [facility] [address]  [facility] [address], [place] [address]     Phone:  [phone]     busPIRone 10 MG tablet    sertraline 25 MG tablet    traZODone 50 MG tablet    venlafaxine 75 MG 24 hr capsule                Primary Care Provider Office Phone # Fax #    Ez Corbett [phone]  [phone]       [address] [facility] [address]        Equal Access to Services     GIANCARLOKERMIT TAVIA : Hadii aad ku haddaytoncristina Somauricio, wacarlitada luqadaha, qaybta kaalmada zeferinogalasusanne, waxay idiin hayaxelteto fossjohnathonglo stover. So [facility] [phone].    ATENCIÓN: Si habla español, tiene a day disposición servicios gratuitos de asistencia lingüística. Llame al [phone].    We comply with applicable federal civil rights laws and [place] laws. We do not discriminate on the basis of race, color, national origin, age, disability, sex, sexual orientation, or gender identity.            Thank you!     Thank you for choosing PSYCHIATRY CLINIC  for your care. Our goal is always to provide you with excellent care. Hearing back from our patients is one way we can continue to improve our services. Please take a few minutes to complete the written survey that you may receive in the mail after your visit with us. Thank you!             Your Updated Medication List - Protect others around you: Learn how to safely use, store and throw away your medicines at www.disposemymeds.org.          This list is accurate as of 3/23/18 11:59 PM.  Always use your most recent med list.                   Brand Name Dispense Instructions for use Diagnosis    busPIRone 10 MG tablet    BUSPAR    360 tablet    Take two 10mg tabs twice daily    Major depressive disorder, single episode, mild (H)       MULTIVITAMINS PO      Take by mouth daily        sertraline 25 MG tablet    ZOLOFT    90 tablet    Take 1.5 tablets (37.5 mg) by mouth daily    Anxiety, Major depressive disorder, recurrent episode, mild (H)       traZODone 50 MG tablet    DESYREL    90 tablet    Take 1 tablet (50 mg) by mouth At Bedtime    Primary insomnia       venlafaxine 75 MG 24 hr capsule    EFFEXOR-XR    90 capsule    Take 1 capsule daily    Major depressive disorder, recurrent episode, moderate (H)

## 2018-03-23 NOTE — PROGRESS NOTES
Outpatient Psychiatry Progress Note     Provider: TYRONE Boss CNP  Date: 3/23/2018  Service:  Medication management.   Patient Identification: Lyn Santana  : 1960   MRN: [id number]    Lyn Santana is a 58 year old female who presents for ongoing psychiatric care.  Lyn was last seen in clinic on 18. At that time, continue Zoloft 25mg daily, buspar 15mg BID, trazodone 50mg QHS PRN, start Effexor XR 37.5mg QAM.     3/23/2018  Today Lyn reports she is OK but feeling anxious  - she's anxious about her 's job loss and his medical insurance.  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 [place]  - on low dose Effexor, she feels more energetic but still struggling with anxiety and high self doubt in social situations  - discusses her lack of sense of self or sense of worth  - working with a new interim  who isn't moving as fast as she'd like in the process to seek the final   - she is starting and finishing projects at work and waiting for her boss to work with her on some projects  - she considers asking for extra projects, she doesn't feel she has enough to do  - her  threw a 58th birthday party for her and she thought half the people would show, but all 57 came    Compliance: daily  Side effects: denies    Psychiatric Review of Systems:  Depression: sense of guilt and shame,  lonely  Anxiety: sense of doom, anxious socially    Lethality: denies    Review of Medical Systems:  Appetite: OK, weight stable  Sleep: falls asleep and wakes at 3a, then 4am, intermittent vivid dreams  Self Care: encouraged, active in her book club  Housework and hygiene: managing normally  Energy: adequate to intact  Concentration: intact    Current Substance Use:    Social History     Social History     Marital status:      Spouse name: N/A     Number of children: N/A      "Years of education: N/A     Social History Main Topics     Smoking status: Former Smoker     Packs/day: 2.00     Years: 12.00     Types: Cigarettes     Start date: 6/1/1971     Quit date: 6/23/1984     Smokeless tobacco: Never Used     Alcohol use Yes      Comment: 4-6 per week     Drug use: No     Sexual activity: Yes     Partners: Male     Birth control/ protection: Post-menopausal     Other Topics Concern     Parent/Sibling W/ Cabg, Mi Or Angioplasty Before 65f 55m? No     Social History Narrative     Limits coffee, prefers red wine and cider but limits 1-2x week    Past Medical History:   Past Medical History:   Diagnosis Date     Depressive disorder Dec. 2013    Have been taking an antidepressant since Jan. 2015; OK now     Medical health update: none today    Allergies: No Known Allergies       Current Medications     Current Outpatient Prescriptions Ordered in [facility]   Medication Sig Dispense Refill     venlafaxine (EFFEXOR-XR) 37.5 MG 24 hr capsule Take 1 capsule daily 30 capsule 0     traZODone (DESYREL) 50 MG tablet Take 1 tablet (50 mg) by mouth At Bedtime 60 tablet 5     busPIRone (BUSPAR) 15 MG tablet Take 1 tablet (15 mg) by mouth 2 times daily 180 tablet 3     sertraline (ZOLOFT) 25 MG tablet Take 1.5 tablets (37.5 mg) by mouth daily 135 tablet 3     Multiple Vitamin (MULTIVITAMINS PO) Take by mouth daily       No current [facility]-ordered facility-administered medications on file.       Mental Status Exam     Appearance:  Formally dressed, Well groomed, Dressed appropriately for weather and Appears stated age  Behavior/relationship to examiner/demeanor:  Cooperative, Engaged and Pleasant  Orientation: Oriented to person, place, time and situation  Psychomotor: WNL  Speech Rate:  Normal  Speech Spontaneity:  Normal  Mood:  \"okay\" and anxious  Affect:  Appropriate/mood-congruent and Anxious/Nervous  Thought Process (Associations):  Logical, Linear and Goal directed  Thought Content:  no evidence of suicidal " or homicidal ideation, no overt psychosis, denies suicidal ideation, intent or thoughts, patient denies auditory and visual hallucinations, patient does not appear to be responding to internal stimuli and denies suicidal intent or plan  Abnormal Perception:  None  Attention/Concentration:  Fair  Language:  Intact  Insight:  Limited  Judgment:  Good      Results     Vital signs: /81  Pulse 77  Wt 49.3 kg (108 lb 9.6 oz)  BMI 20.86 kg/m2    Laboratory Data:   Lab Results   Component Value Date    WBC 6.5 09/11/2012    HGB 14.9 09/11/2012    HCT 44.1 09/11/2012     09/11/2012    CHOL 222 (H) 04/10/2017    TRIG 53 04/10/2017     04/10/2017     04/10/2017    BUN 23 04/10/2017    CO2 29 04/10/2017    TSH 2.22 04/10/2017     Assessment & Plan     Lyn is seen today for follow up.  - encouraged exercise  - discussed mood in context of chronic stress      Diagnosis  Axis 1: MDD, recurrent, moderate  Axis 2: none      Plan:   Medication: continue Zoloft 25mg daily, increase buspar 20mg BID, trazodone 50mg QHS PRN, increase Effexor XR 75mg QAM  OTC Recommendations: none  Lab Orders: none  Referrals: due to strained finances, she declines therapy referral today  Release of Information: none  Future Treatment Considerations: monitor ASE including sweating, potential taper off SSRI if Effexor is tolerated and effective  Return for Follow Up: 4-8 weeks, sooner as needed    She voices understanding of the treatment plan including discussion of options, risks/ benefits. She has clinic contact information and will seek emergency services if urgent or life threatening symptoms present. Lyn understands risks associated with drug and alcohol use.     Visit was spent counseling the patient and/or coordinating care regarding review of social and occupational functioning.  In addition patient was counseled on health and wellness practices to augment medication treatment of symptoms. See note for  details.    PHQ-9 score:  10  PHQ-9 SCORE 2/9/2018   Total Score -   Total Score MyChart -   Total Score 10     GAD7:   LEONOR-7 SCORE 4/24/2017 5/8/2017 12/12/2017   Total Score - - -   Total Score 14 5 15     : 01/2017  Verónica Leo, APRN CNP 3/23/2018

## 2018-03-23 NOTE — NURSING NOTE
Chief Complaint   Patient presents with     Recheck Medication     MDD     Reviewed allergies, smoking status, pharmacy preference, and medications  Administered abuse screening questions  Obtained weight, blood pressure and heart rate

## 2018-03-27 ASSESSMENT — PATIENT HEALTH QUESTIONNAIRE - PHQ9: SUM OF ALL RESPONSES TO PHQ QUESTIONS 1-9: 10

## 2018-06-22 ENCOUNTER — OFFICE VISIT (OUTPATIENT)
Dept: PSYCHIATRY | Facility: CLINIC | Age: 58
End: 2018-06-22
Attending: NURSE PRACTITIONER
Payer: COMMERCIAL

## 2018-06-22 VITALS
DIASTOLIC BLOOD PRESSURE: 82 MMHG | SYSTOLIC BLOOD PRESSURE: 128 MMHG | HEART RATE: 72 BPM | WEIGHT: 110.8 LBS | BODY MASS INDEX: 21.28 KG/M2

## 2018-06-22 DIAGNOSIS — F32.0 MAJOR DEPRESSIVE DISORDER, SINGLE EPISODE, MILD (H): ICD-10-CM

## 2018-06-22 DIAGNOSIS — F33.0 MAJOR DEPRESSIVE DISORDER, RECURRENT EPISODE, MILD (H): ICD-10-CM

## 2018-06-22 DIAGNOSIS — F33.1 MAJOR DEPRESSIVE DISORDER, RECURRENT EPISODE, MODERATE (H): ICD-10-CM

## 2018-06-22 DIAGNOSIS — F41.9 ANXIETY: ICD-10-CM

## 2018-06-22 DIAGNOSIS — F51.01 PRIMARY INSOMNIA: ICD-10-CM

## 2018-06-22 PROCEDURE — G0463 HOSPITAL OUTPT CLINIC VISIT: HCPCS | Mod: ZF

## 2018-06-22 RX ORDER — TRAZODONE HYDROCHLORIDE 50 MG/1
50 TABLET, FILM COATED ORAL AT BEDTIME
Qty: 90 TABLET | Refills: 0 | Status: SHIPPED | OUTPATIENT
Start: 2018-06-22 | End: 2018-09-21

## 2018-06-22 RX ORDER — BUSPIRONE HYDROCHLORIDE 30 MG/1
TABLET ORAL
Qty: 180 TABLET | Refills: 0 | Status: SHIPPED | OUTPATIENT
Start: 2018-06-22 | End: 2018-08-22 | Stop reason: DRUGHIGH

## 2018-06-22 RX ORDER — VENLAFAXINE HYDROCHLORIDE 37.5 MG/1
CAPSULE, EXTENDED RELEASE ORAL
Qty: 90 CAPSULE | Refills: 0 | Status: SHIPPED | OUTPATIENT
Start: 2018-06-22 | End: 2018-09-21

## 2018-06-22 RX ORDER — SERTRALINE HYDROCHLORIDE 25 MG/1
25 TABLET, FILM COATED ORAL DAILY
Qty: 90 TABLET | Refills: 0 | Status: SHIPPED | OUTPATIENT
Start: 2018-06-22 | End: 2018-09-21

## 2018-06-22 ASSESSMENT — PAIN SCALES - GENERAL: PAINLEVEL: NO PAIN (0)
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
## 2018-06-22 NOTE — MR AVS SNAPSHOT
After Visit Summary   6/22/2018    Lyn Santana    MRN: [id number]           Patient Information     Date Of Birth          1960        Visit Information        Provider Department      6/22/2018 3:00 PM Verónica Leo APRN CNP Psychiatry Clinic        Today's Diagnoses     Major depressive disorder, single episode, mild (H)        Anxiety        Major depressive disorder, recurrent episode, mild (H)        Primary insomnia        Major depressive disorder, recurrent episode, moderate (H)           Follow-ups after your visit        Follow-up notes from your care team     Return in about 12 weeks (around 9/14/2018), or if symptoms worsen or fail to improve, for BP Recheck, Routine Visit.      Your next 10 appointments already scheduled     Sep 21, 2018  3:00 PM CDT   Adult Med Follow UP with TYRONE Ashby CNP   Psychiatry Clinic ([facility])    [place] [id number]  [address] [address] [phone]              Who to contact     Please call your clinic at [phone] to:    Ask questions about your health    Make or cancel appointments    Discuss your medicines    Learn about your test results    Speak to your doctor            Additional Information About Your Visit        MyChart Information     Int[employer] gives you secure access to your electronic health record. If you see a primary care provider, you can also send messages to your care team and make appointments. If you have questions, please call your primary care clinic.  If you do not have a primary care provider, please call [phone] and they will assist you.      Intellistream is an electronic gateway that provides easy, online access to your medical records. With [employer], you can request a clinic appointment, read your test results, renew a prescription or communicate with your care team.     To access your existing account, please contact your [facility] or call [phone] for assistance.        Care EveryWhere ID     This is your Care EveryWhere ID. This could be used by other organizations to access your [facility] medical records  [id number]        Your Vitals Were     Pulse BMI (Body Mass Index)                72 21.28 kg/m2           Blood Pressure from Last 3 Encounters:   06/22/18 128/82   03/23/18 125/81   02/09/18 132/80    Weight from Last 3 Encounters:   06/22/18 50.3 kg (110 lb 12.8 oz)   03/23/18 49.3 kg (108 lb 9.6 oz)   02/09/18 49.5 kg (109 lb 3.2 oz)              Today, you had the following     No orders found for display         Today's Medication Changes          These changes are accurate as of 6/22/18 11:59 PM.  If you have any questions, ask your nurse or doctor.               These medicines have changed or have updated prescriptions.        Dose/Directions    BusPIRone HCl 30 MG Tabs   This may have changed:  medication strength   Used for:  Major depressive disorder, single episode, mild (H)        Take two 10mg tabs twice daily   Quantity:  180 tablet   Refills:  0       sertraline 25 MG tablet   Commonly known as:  ZOLOFT   This may have changed:  how much to take   Used for:  Anxiety, Major depressive disorder, recurrent episode, mild (H)        Dose:  25 mg   Take 1 tablet (25 mg) by mouth daily   Quantity:  90 tablet   Refills:  0       venlafaxine 37.5 MG 24 hr capsule   Commonly known as:  EFFEXOR-XR   This may have changed:  medication strength   Used for:  Major depressive disorder, recurrent episode, moderate (H)        Take 1 capsule daily   Quantity:  90 capsule   Refills:  0            Where to get your medicines      These medications were sent to [id number] - [place], [place] - [address] AT [facility] [address]  [facility] [address] [place] [address]     Phone:  [phone]     BusPIRone HCl 30 MG Tabs    sertraline 25 MG tablet    traZODone 50 MG tablet     venlafaxine 37.5 MG 24 hr capsule                Primary Care Provider Office Phone # Fax #    Ez Corbett [phone]       [address] [place] [address]        Equal Access to Services     AMITA SQUIRES : Hadii brenda petersen burak Soolayinkaali, waaxda luqadaha, qaybta kaalmada adeterrell, trang juanitoin hayaateto deesarika sanchez harris stover. So [facility] [phone].    ATENCIÓN: Si habla español, tiene a day disposición servicios gratuitos de asistencia lingüística. Llame al [phone].    We comply with applicable federal civil rights laws and [place] laws. We do not discriminate on the basis of race, color, national origin, age, disability, sex, sexual orientation, or gender identity.            Thank you!     Thank you for choosing PSYCHIATRY CLINIC  for your care. Our goal is always to provide you with excellent care. Hearing back from our patients is one way we can continue to improve our services. Please take a few minutes to complete the written survey that you may receive in the mail after your visit with us. Thank you!             Your Updated Medication List - Protect others around you: Learn how to safely use, store and throw away your medicines at www.disposemymeds.org.          This list is accurate as of 6/22/18 11:59 PM.  Always use your most recent med list.                   Brand Name Dispense Instructions for use Diagnosis    BusPIRone HCl 30 MG Tabs     180 tablet    Take two 10mg tabs twice daily    Major depressive disorder, single episode, mild (H)       MULTIVITAMINS PO      Take by mouth daily        sertraline 25 MG tablet    ZOLOFT    90 tablet    Take 1 tablet (25 mg) by mouth daily    Anxiety, Major depressive disorder, recurrent episode, mild (H)       traZODone 50 MG tablet    DESYREL    90 tablet    Take 1 tablet (50 mg) by mouth At Bedtime    Primary insomnia       venlafaxine 37.5 MG 24 hr capsule    EFFEXOR-XR    90 capsule    Take 1 capsule daily    Major  depressive disorder, recurrent episode, moderate (H)

## 2018-06-26 ASSESSMENT — PATIENT HEALTH QUESTIONNAIRE - PHQ9: SUM OF ALL RESPONSES TO PHQ QUESTIONS 1-9: 10

## 2018-08-21 ENCOUNTER — MYC MEDICAL ADVICE (OUTPATIENT)
Dept: PSYCHIATRY | Facility: CLINIC | Age: 58
End: 2018-08-21

## 2018-08-21 DIAGNOSIS — F32.0 MAJOR DEPRESSIVE DISORDER, SINGLE EPISODE, MILD (H): ICD-10-CM

## 2018-08-21 NOTE — TELEPHONE ENCOUNTER
FW: Question about medications  Received: Today       Andi Napoles APRN CNP Pratt, Laura, RN       Phone Number: [phone]                     She can titrate by taking 10 BID for 4-5 days then back to 20mg BID.         -Writer will inform pt of the covering provider's suggestions. Will confirm pharmacy information.

## 2018-08-22 RX ORDER — BUSPIRONE HYDROCHLORIDE 10 MG/1
TABLET ORAL
Qty: 130 TABLET | Refills: 0 | Status: SHIPPED | OUTPATIENT
Start: 2018-08-22 | End: 2018-09-21

## 2018-08-22 NOTE — TELEPHONE ENCOUNTER
-Pt responded and confirmed pharmacy. Writer sent supply reflecting provider's directions. Notified pt of this via [employer].

## 2018-09-21 ENCOUNTER — OFFICE VISIT (OUTPATIENT)
Dept: PSYCHIATRY | Facility: CLINIC | Age: 58
End: 2018-09-21
Attending: NURSE PRACTITIONER
Payer: COMMERCIAL

## 2018-09-21 VITALS
WEIGHT: 113 LBS | SYSTOLIC BLOOD PRESSURE: 129 MMHG | HEART RATE: 70 BPM | DIASTOLIC BLOOD PRESSURE: 69 MMHG | BODY MASS INDEX: 21.71 KG/M2

## 2018-09-21 DIAGNOSIS — F33.1 MAJOR DEPRESSIVE DISORDER, RECURRENT EPISODE, MODERATE (H): ICD-10-CM

## 2018-09-21 DIAGNOSIS — F32.0 MAJOR DEPRESSIVE DISORDER, SINGLE EPISODE, MILD (H): ICD-10-CM

## 2018-09-21 DIAGNOSIS — F51.01 PRIMARY INSOMNIA: ICD-10-CM

## 2018-09-21 PROCEDURE — G0463 HOSPITAL OUTPT CLINIC VISIT: HCPCS | Mod: ZF

## 2018-09-21 RX ORDER — TRAZODONE HYDROCHLORIDE 50 MG/1
50 TABLET, FILM COATED ORAL AT BEDTIME
Qty: 90 TABLET | Refills: 0 | Status: SHIPPED | OUTPATIENT
Start: 2018-09-21 | End: 2018-12-27

## 2018-09-21 RX ORDER — BUSPIRONE HYDROCHLORIDE 10 MG/1
TABLET ORAL
Qty: 180 TABLET | Refills: 0 | Status: SHIPPED | OUTPATIENT
Start: 2018-09-21 | End: 2019-01-30

## 2018-09-21 RX ORDER — VENLAFAXINE HYDROCHLORIDE 37.5 MG/1
CAPSULE, EXTENDED RELEASE ORAL
Qty: 90 CAPSULE | Refills: 0 | Status: SHIPPED | OUTPATIENT
Start: 2018-09-21 | End: 2019-01-30

## 2018-09-21 ASSESSMENT — PAIN SCALES - GENERAL: PAINLEVEL: NO PAIN (0)
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
## 2018-09-21 NOTE — NURSING NOTE
Chief Complaint   Patient presents with     Recheck Medication     Major depressive disorder, single episode, mild

## 2018-09-21 NOTE — MR AVS SNAPSHOT
After Visit Summary   9/21/2018    Lyn Santana    MRN: [id number]           Patient Information     Date Of Birth          1960        Visit Information        Provider Department      9/21/2018 3:00 PM Verónica Leo APRN CNP [facility]        Today's Diagnoses     Major depressive disorder, single episode, mild (H)        Primary insomnia        Major depressive disorder, recurrent episode, moderate (H)           Follow-ups after your visit        Follow-up notes from your care team     Return in about 12 weeks (around 12/14/2018), or if symptoms worsen or fail to improve, for BP Recheck, Routine Visit.      Your next 10 appointments already scheduled     Dec 14, 2018  4:00 PM CST   Adult Med Follow UP with TYRONE Ashby CNP   Psychiatry Clinic ([facility])    [place] [id number]  [address] [address] [phone]              Who to contact     Please call your clinic at [phone] to:    Ask questions about your health    Make or cancel appointments    Discuss your medicines    Learn about your test results    Speak to your doctor            Additional Information About Your Visit        MyChart Information     [employer] gives you secure access to your electronic health record. If you see a primary care provider, you can also send messages to your care team and make appointments. If you have questions, please call your primary care clinic.  If you do not have a primary care provider, please call [phone] and they will assist you.      [employer] is an electronic gateway that provides easy, online access to your medical records. With [employer] you can request a clinic appointment, read your test results, renew a prescription or communicate with your care team.     To access your existing account, please contact your [facility] or call [phone] for assistance.        Care  EveryWhere ID     This is your Care EveryWhere ID. This could be used by other organizations to access your [place] medical records  [id number]        Your Vitals Were     Pulse BMI (Body Mass Index)                70 21.71 kg/m2           Blood Pressure from Last 3 Encounters:   09/21/18 129/69   06/22/18 128/82   03/23/18 125/81    Weight from Last 3 Encounters:   09/21/18 51.3 kg (113 lb)   06/22/18 50.3 kg (110 lb 12.8 oz)   03/23/18 49.3 kg (108 lb 9.6 oz)              Today, you had the following     No orders found for display         Today's Medication Changes          These changes are accurate as of 9/21/18 11:59 PM.  If you have any questions, ask your nurse or doctor.               These medicines have changed or have updated prescriptions.        Dose/Directions    busPIRone 10 MG tablet   Commonly known as:  BUSPAR   This may have changed:  additional instructions   Used for:  Major depressive disorder, single episode, mild (H)   Changed by:  Verónica Leo APRN CNP        Take one 10 mg tab twice daily   Quantity:  180 tablet   Refills:  0         Stop taking these medicines if you haven't already. Please contact your care team if you have questions.     sertraline 25 MG tablet   Commonly known as:  ZOLOFT   Stopped by:  Verónica Leo APRN CNP                Where to get your medicines      These medications were sent to [id number] - [place], [place] - [address]  AT [facility] [address]  [facility] [address], [place] [address]     Phone:  [phone]     busPIRone 10 MG tablet    traZODone 50 MG tablet    venlafaxine 37.5 MG 24 hr capsule                Primary Care Provider Office Phone # Fax #    Ez Corbett [phone] [address] [facility] [address]        Equal Access to Services     AMITA SQUIRES AH: Vamsi sumner Somauricio, waaxda luqadaha, qaybta trang douglas  laura iglesias ah. So [facility] [phone].    ATENCIÓN: Si sung villafuerte, tiene a day disposición servicios gratuitos de asistencia lingüística. Abbey al [phone].    We comply with applicable federal civil rights laws and [place] laws. We do not discriminate on the basis of race, color, national origin, age, disability, sex, sexual orientation, or gender identity.            Thank you!     Thank you for choosing PS[facility]AT[facility]INIC  for your care. Our goal is always to provide you with excellent care. Hearing back from our patients is one way we can continue to improve our services. Please take a few minutes to complete the written survey that you may receive in the mail after your visit with us. Thank you!             Your Updated Medication List - Protect others around you: Learn how to safely use, store and throw away your medicines at www.disposemymeds.org.          This list is accurate as of 9/21/18 11:59 PM.  Always use your most recent med list.                   Brand Name Dispense Instructions for use Diagnosis    busPIRone 10 MG tablet    BUSPAR    180 tablet    Take one 10 mg tab twice daily    Major depressive disorder, single episode, mild (H)       MULTIVITAMINS PO      Take by mouth daily        traZODone 50 MG tablet    DESYREL    90 tablet    Take 1 tablet (50 mg) by mouth At Bedtime    Primary insomnia       venlafaxine 37.5 MG 24 hr capsule    EFFEXOR-XR    90 capsule    Take 1 capsule daily    Major depressive disorder, recurrent episode, moderate (H)

## 2018-09-25 ASSESSMENT — PATIENT HEALTH QUESTIONNAIRE - PHQ9: SUM OF ALL RESPONSES TO PHQ QUESTIONS 1-9: 5

## 2018-12-09 ENCOUNTER — MYC MEDICAL ADVICE (OUTPATIENT)
Dept: FAMILY MEDICINE | Facility: CLINIC | Age: 58
End: 2018-12-09

## 2018-12-27 ENCOUNTER — MYC MEDICAL ADVICE (OUTPATIENT)
Dept: PSYCHIATRY | Facility: CLINIC | Age: 58
End: 2018-12-27

## 2018-12-27 DIAGNOSIS — F51.01 PRIMARY INSOMNIA: ICD-10-CM

## 2018-12-27 RX ORDER — TRAZODONE HYDROCHLORIDE 50 MG/1
50 TABLET, FILM COATED ORAL AT BEDTIME
Qty: 30 TABLET | Refills: 0 | Status: SHIPPED | OUTPATIENT
Start: 2018-12-27 | End: 2019-01-30

## 2018-12-27 NOTE — TELEPHONE ENCOUNTER
Medication requested:  traZODone (DESYREL) 50 MG    Last refilled: 11/24/18  Qty: 90  *  continue trazodone 50mg   *prefers 90 day fills    Last seen: 9/21/18  RTC: 3 MOS  Cancel:   X 1  No-show: 0   Next appt: NONE  30 day stanley refill sent PENDING - including instructions for patient to call the clinic and schedule an appointment.  Scheduling has been notified to contact the pt for appointment.  Routing refill request to provider for review/approval because:  30 DAY RF PENDING    1 CANCEL  / NO APPT.

## 2019-01-14 ENCOUNTER — MYC MEDICAL ADVICE (OUTPATIENT)
Dept: FAMILY MEDICINE | Facility: CLINIC | Age: 59
End: 2019-01-14

## 2019-01-14 DIAGNOSIS — F51.01 PRIMARY INSOMNIA: ICD-10-CM

## 2019-01-14 RX ORDER — TRAZODONE HYDROCHLORIDE 50 MG/1
50 TABLET, FILM COATED ORAL AT BEDTIME
Qty: 30 TABLET | Refills: 0 | Status: CANCELLED | OUTPATIENT
Start: 2019-01-14

## 2019-01-14 NOTE — TELEPHONE ENCOUNTER
Route to TC to watch and then call if she doesn't schedule. Last prescribed by an outside provider. Virtual done on 12/12/17- sent Cover requesting that she schedule a physical.  Margaret Eason RN

## 2019-01-15 NOTE — TELEPHONE ENCOUNTER
LMOM for patient to call back main clinic number to schedule a physical.    Thank you,  Norma PELAYO    NE Team Rachel

## 2019-01-15 NOTE — TELEPHONE ENCOUNTER
Patient didn't schedule a 40 minute physical, please call to see if she is willing to reschedule.   Margaret Eason RN

## 2019-01-16 NOTE — TELEPHONE ENCOUNTER
RE: PT NEEDS APPT   Received: Yesterday   Message Contents   Verónica Leo APRN CNP Patel, Radhika, RN             Yes, she was tapering off meds. Might have stopped, might be seeing PCP     - No further action needed by this writer

## 2019-01-17 NOTE — TELEPHONE ENCOUNTER
2nd attempt to reach patient. LMOM for patient to call back main clinic number to re-schedule an appointment.    Thank you,  Norma PELAYO    NE Team Rachel

## 2019-01-30 ENCOUNTER — OFFICE VISIT (OUTPATIENT)
Dept: FAMILY MEDICINE | Facility: CLINIC | Age: 59
End: 2019-01-30
Payer: COMMERCIAL

## 2019-01-30 VITALS
BODY MASS INDEX: 24.84 KG/M2 | HEART RATE: 92 BPM | HEIGHT: 60 IN | WEIGHT: 126.5 LBS | DIASTOLIC BLOOD PRESSURE: 78 MMHG | SYSTOLIC BLOOD PRESSURE: 122 MMHG | TEMPERATURE: 97.9 F

## 2019-01-30 DIAGNOSIS — Z12.11 SCREEN FOR COLON CANCER: ICD-10-CM

## 2019-01-30 DIAGNOSIS — Z00.00 ROUTINE HISTORY AND PHYSICAL EXAMINATION OF ADULT: Primary | ICD-10-CM

## 2019-01-30 DIAGNOSIS — Z11.4 SCREENING FOR HIV (HUMAN IMMUNODEFICIENCY VIRUS): ICD-10-CM

## 2019-01-30 DIAGNOSIS — Z12.31 VISIT FOR SCREENING MAMMOGRAM: ICD-10-CM

## 2019-01-30 DIAGNOSIS — F51.01 PRIMARY INSOMNIA: ICD-10-CM

## 2019-01-30 DIAGNOSIS — F32.5 MAJOR DEPRESSION IN COMPLETE REMISSION (H): ICD-10-CM

## 2019-01-30 DIAGNOSIS — Z11.59 NEED FOR HEPATITIS C SCREENING TEST: ICD-10-CM

## 2019-01-30 PROCEDURE — 80061 LIPID PANEL: CPT | Performed by: FAMILY MEDICINE

## 2019-01-30 PROCEDURE — 36415 COLL VENOUS BLD VENIPUNCTURE: CPT | Performed by: FAMILY MEDICINE

## 2019-01-30 PROCEDURE — 86803 HEPATITIS C AB TEST: CPT | Performed by: FAMILY MEDICINE

## 2019-01-30 PROCEDURE — 87624 HPV HI-RISK TYP POOLED RSLT: CPT | Performed by: FAMILY MEDICINE

## 2019-01-30 PROCEDURE — 99396 PREV VISIT EST AGE 40-64: CPT | Mod: 25 | Performed by: FAMILY MEDICINE

## 2019-01-30 PROCEDURE — 80048 BASIC METABOLIC PNL TOTAL CA: CPT | Performed by: FAMILY MEDICINE

## 2019-01-30 PROCEDURE — 87389 HIV-1 AG W/HIV-1&-2 AB AG IA: CPT | Performed by: FAMILY MEDICINE

## 2019-01-30 PROCEDURE — 90750 HZV VACC RECOMBINANT IM: CPT | Performed by: FAMILY MEDICINE

## 2019-01-30 PROCEDURE — 90471 IMMUNIZATION ADMIN: CPT | Performed by: FAMILY MEDICINE

## 2019-01-30 PROCEDURE — G0145 SCR C/V CYTO,THINLAYER,RESCR: HCPCS | Performed by: FAMILY MEDICINE

## 2019-01-30 RX ORDER — TRAZODONE HYDROCHLORIDE 50 MG/1
50 TABLET, FILM COATED ORAL AT BEDTIME
Qty: 90 TABLET | Refills: 3 | Status: SHIPPED | OUTPATIENT
Start: 2019-01-30 | End: 2020-02-14

## 2019-01-30 RX ORDER — VENLAFAXINE HYDROCHLORIDE 37.5 MG/1
CAPSULE, EXTENDED RELEASE ORAL
Qty: 90 CAPSULE | Refills: 0 | Status: CANCELLED | OUTPATIENT
Start: 2019-01-30

## 2019-01-30 RX ORDER — BUSPIRONE HYDROCHLORIDE 10 MG/1
TABLET ORAL
Qty: 180 TABLET | Refills: 0 | Status: CANCELLED | OUTPATIENT
Start: 2019-01-30

## 2019-01-30 ASSESSMENT — ENCOUNTER SYMPTOMS
HEMATURIA: 0
DIZZINESS: 0
HEARTBURN: 0
CONSTIPATION: 0
CHILLS: 0
HEADACHES: 0
DYSURIA: 0
ABDOMINAL PAIN: 0
FEVER: 0
PARESTHESIAS: 0
HEMATOCHEZIA: 0
NAUSEA: 0
SORE THROAT: 0
MYALGIAS: 0
FREQUENCY: 0
DIARRHEA: 0
ARTHRALGIAS: 0
PALPITATIONS: 0
SHORTNESS OF BREATH: 0
BREAST MASS: 0
NERVOUS/ANXIOUS: 0
COUGH: 0
JOINT SWELLING: 0
WEAKNESS: 0
EYE PAIN: 0
NERVOUS/ANXIOUS: 1

## 2019-01-30 ASSESSMENT — ANXIETY QUESTIONNAIRES
3. WORRYING TOO MUCH ABOUT DIFFERENT THINGS: NOT AT ALL
6. BECOMING EASILY ANNOYED OR IRRITABLE: NOT AT ALL
2. NOT BEING ABLE TO STOP OR CONTROL WORRYING: NOT AT ALL
IF YOU CHECKED OFF ANY PROBLEMS ON THIS QUESTIONNAIRE, HOW DIFFICULT HAVE THESE PROBLEMS MADE IT FOR YOU TO DO YOUR WORK, TAKE CARE OF THINGS AT HOME, OR GET ALONG WITH OTHER PEOPLE: NOT DIFFICULT AT ALL
1. FEELING NERVOUS, ANXIOUS, OR ON EDGE: NOT AT ALL
GAD7 TOTAL SCORE: 0
7. FEELING AFRAID AS IF SOMETHING AWFUL MIGHT HAPPEN: NOT AT ALL
5. BEING SO RESTLESS THAT IT IS HARD TO SIT STILL: NOT AT ALL

## 2019-01-30 ASSESSMENT — PATIENT HEALTH QUESTIONNAIRE - PHQ9
SUM OF ALL RESPONSES TO PHQ QUESTIONS 1-9: 0
5. POOR APPETITE OR OVEREATING: NOT AT ALL

## 2019-01-30 ASSESSMENT — MIFFLIN-ST. JEOR: SCORE: 1065.3
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
## 2019-01-30 NOTE — LETTER
February 6, 2019    Lyn Santana  [address]  [place] [address]    Dear ,  This letter is regarding your recent Pap smear (cervical cancer screening) and Human Papillomavirus (HPV) test.  We are happy to inform you that your Pap smear result is normal. Cervical cancer is closely linked with certain types of HPV. Your results showed no evidence of high-risk HPV.  Therefore we recommend you return in 3 years for your next pap smear.  You will still need to return to the clinic every year for an annual exam and other preventive tests.  If you have additional questions regarding this result, please call our registered nurse, Em at [phone].  Sincerely,    Ez Corbett MD/myriam

## 2019-01-30 NOTE — PATIENT INSTRUCTIONS
Preventive Health Recommendations  Female Ages 50 - 64    Yearly exam: See your health care provider every year in order to  o Review health changes.   o Discuss preventive care.    o Review your medicines if your doctor has prescribed any.      Get a Pap test every three years (unless you have an abnormal result and your provider advises testing more often).    If you get Pap tests with HPV test, you only need to test every 5 years, unless you have an abnormal result.     You do not need a Pap test if your uterus was removed (hysterectomy) and you have not had cancer.    You should be tested each year for STDs (sexually transmitted diseases) if you're at risk.     Have a mammogram every 1 to 2 years.    Have a colonoscopy at age 50, or have a yearly FIT test (stool test). These exams screen for colon cancer.      Have a cholesterol test every 5 years, or more often if advised.    Have a diabetes test (fasting glucose) every three years. If you are at risk for diabetes, you should have this test more often.     If you are at risk for osteoporosis (brittle bone disease), think about having a bone density scan (DEXA).    Shots: Get a flu shot each year. Get a tetanus shot every 10 years.    Nutrition:     Eat at least 5 servings of fruits and vegetables each day.    Eat whole-grain bread, whole-wheat pasta and brown rice instead of white grains and rice.    Get adequate Calcium and Vitamin D.     Lifestyle    Exercise at least 150 minutes a week (30 minutes a day, 5 days a week). This will help you control your weight and prevent disease.    Limit alcohol to one drink per day.    No smoking.     Wear sunscreen to prevent skin cancer.     See your dentist every six months for an exam and cleaning.    See your eye doctor every 1 to 2 years.    Orders Placed This Encounter     MA SCREENING DIGITAL BILAT - Future  (s+30)     Standing Status:   Future     Standing Expiration Date:   1/30/2020     Order Specific Question:    Clinical Info for the Interpreting Provider     Answer:   screen     Order Specific Question:   Does patient have lump,drainage,discharge,focal pain,other symptoms?     Answer:   NO - order as requested     Order Specific Question:   Has the patient had a bilateral mastectomy?     Answer:   NO - order as requested     Order Specific Question:   Was the patient treated for breast cancer within the last two years?     Answer:   NO - order as requested     Order Specific Question:   Do you want to order follow up Imaging as recommended by Radiologist?     Answer:   Yes:Diagnostic Breast Imaging Assessment, as deemed appropriate, by the supervising radiologist, may include one or more: Diagnostic Mammogram, Ultrasound, Cyst Aspiration, FNA, Biopsy, Abscess Drainage, Ductogram, or Contrast Enhanced Mammography     ZOSTER VACCINE RECOMBINANT ADJUVANTED IM NJX     Hepatitis C Screen Reflex to HCV RNA Quant and Genotype     The Hepatitis C Screen testing will be used for patients born between 1945 and 1965 following the CDC recommendations. HEP C screening testing can also be ordered for any patient for which it is clinically recommended.     HIV Screening     Fecal colorectal cancer screen (FIT)     Standing Status:   Future     Standing Expiration Date:   4/24/2019     Lipid panel reflex to direct LDL Fasting     Last Lab Result: LDL Cholesterol Calculated (mg/dL)       Date                     Value                 04/10/2017               94               ----------     Order Specific Question:   Perform labs while fasting or non-fasting?     Answer:   Fasting     Basic metabolic panel     Pap imaged thin layer screen with HPV - recommended age 30 - 65 years (select HPV order below)     Pap imaged thin layer screen with HPV - recommended age 30 - 65 years    Answer REQUIRED pap questions below:    LMP (date):  No LMP recorded (lmp unknown). Patient is postmenopausal.  PAP SOURCE:  Cervical - Endocervical  PREVIOUS PAP  RESULTS:  Lab Results       Component                Value               Date                       PAP                      NIL                 03/26/2014            PERTINENT CLINICAL HISTORY:  NONE     traZODone (DESYREL) 50 MG tablet     Sig: Take 1 tablet (50 mg) by mouth At Bedtime     Dispense:  90 tablet     Refill:  3     Scheduling Appointments      [facility]. All locations.  Call: [phone]

## 2019-01-30 NOTE — NURSING NOTE
Prior to injection, verified patient identity using patient's name and date of birth.  Due to injection administration, patient instructed to remain in clinic for 15 minutes  afterwards, and to report any adverse reaction to me immediately.    JACINTO Urbina MA      VIS for Shingrix given on same date of administration.  Staff signature/Title: JACINTO Urbina MA

## 2019-01-31 LAB
ANION GAP SERPL CALCULATED.3IONS-SCNC: 7 MMOL/L (ref 3–14)
BUN SERPL-MCNC: 19 MG/DL (ref 7–30)
CALCIUM SERPL-MCNC: 8.9 MG/DL (ref 8.5–10.1)
CHLORIDE SERPL-SCNC: 106 MMOL/L (ref 94–109)
CHOLEST SERPL-MCNC: 223 MG/DL
CO2 SERPL-SCNC: 28 MMOL/L (ref 20–32)
CREAT SERPL-MCNC: 1.03 MG/DL (ref 0.52–1.04)
GFR SERPL CREATININE-BSD FRML MDRD: 59 ML/MIN/{1.73_M2}
GLUCOSE SERPL-MCNC: 79 MG/DL (ref 70–99)
HCV AB SERPL QL IA: NONREACTIVE
HDLC SERPL-MCNC: 110 MG/DL
HIV 1+2 AB+HIV1 P24 AG SERPL QL IA: NONREACTIVE
LDLC SERPL CALC-MCNC: 93 MG/DL
NONHDLC SERPL-MCNC: 113 MG/DL
POTASSIUM SERPL-SCNC: 3.5 MMOL/L (ref 3.4–5.3)
SODIUM SERPL-SCNC: 141 MMOL/L (ref 133–144)
TRIGL SERPL-MCNC: 98 MG/DL

## 2019-01-31 ASSESSMENT — ANXIETY QUESTIONNAIRES: GAD7 TOTAL SCORE: 0

## 2019-02-01 LAB
COPATH REPORT: NORMAL
PAP: NORMAL

## 2019-02-05 DIAGNOSIS — Z12.11 SCREEN FOR COLON CANCER: ICD-10-CM

## 2019-02-05 LAB
FINAL DIAGNOSIS: NORMAL
HEMOCCULT STL QL IA: NEGATIVE
HPV HR 12 DNA CVX QL NAA+PROBE: NEGATIVE
HPV16 DNA SPEC QL NAA+PROBE: NEGATIVE
HPV18 DNA SPEC QL NAA+PROBE: NEGATIVE
SPECIMEN DESCRIPTION: NORMAL
SPECIMEN SOURCE CVX/VAG CYTO: NORMAL

## 2019-02-05 PROCEDURE — 82274 ASSAY TEST FOR BLOOD FECAL: CPT | Performed by: FAMILY MEDICINE

## 2019-02-22 ENCOUNTER — ANCILLARY PROCEDURE (OUTPATIENT)
Dept: MAMMOGRAPHY | Facility: CLINIC | Age: 59
End: 2019-02-22
Attending: FAMILY MEDICINE
Payer: COMMERCIAL

## 2019-02-22 DIAGNOSIS — Z12.31 VISIT FOR SCREENING MAMMOGRAM: ICD-10-CM

## 2019-08-05 ENCOUNTER — TRANSFERRED RECORDS (OUTPATIENT)
Dept: HEALTH INFORMATION MANAGEMENT | Facility: CLINIC | Age: 59
End: 2019-08-05

## 2019-08-05 LAB
CHOLEST SERPL-MCNC: 196 MG/DL (ref 120–200)
GLUCOSE SERPL-MCNC: 106 MG/DL (ref 70–125)
HDLC SERPL-MCNC: 124.7 MG/DL (ref 25–75)
LDLC SERPL CALC-MCNC: 49 MG/DL (ref 80–129)
TRIGL SERPL-MCNC: 107 MG/DL (ref 10–150)

## 2019-08-07 ENCOUNTER — MYC MEDICAL ADVICE (OUTPATIENT)
Dept: FAMILY MEDICINE | Facility: CLINIC | Age: 59
End: 2019-08-07

## 2019-08-07 ENCOUNTER — TELEPHONE (OUTPATIENT)
Dept: FAMILY MEDICINE | Facility: CLINIC | Age: 59
End: 2019-08-07

## 2019-08-07 NOTE — TELEPHONE ENCOUNTER
Panel Management Review      Patient has the following on her problem list:     Depression / Dysthymia review    Measure:  Needs PHQ-9 score of 4 or less during index window.  Administer PHQ-9 and if score is 5 or more, send encounter to provider for next steps.    5 - 7 month window range: Due by 8/21    PHQ-9 SCORE 10/14/2017 12/12/2017 1/30/2019   PHQ-9 Total Score - - -   PHQ-9 Total Score MyChart 10 (Moderate depression) - -   PHQ-9 Total Score 10 14 0   Some encounter information is confidential and restricted. Go to Review Flowsheets activity to see all data.       If PHQ-9 recheck is 5 or more, route to provider for next steps.    Patient is due for:  PHQ9      Composite cancer screening  Chart review shows that this patient is due/due soon for the following None  Summary:    Patient is due/failing the following:   PHQ9    Action needed:   Patient needs to do PHQ9.    Type of outreach:    Sent [employer] message.    Questions for provider review:    None                                                                                                                                    Carolina Lezama MA       Chart routed to Care Team .

## 2019-08-07 NOTE — TELEPHONE ENCOUNTER
Panel Management Review      Patient has the following on her problem list:     Depression / Dysthymia review    Measure:  Needs PHQ-9 score of 4 or less during index window.  Administer PHQ-9 and if score is 5 or more, send encounter to provider for next steps.    12 month remission PHQ-9 follow up date range: 4/23-8/21      PHQ-9 SCORE 10/14/2017 12/12/2017 1/30/2019   PHQ-9 Total Score - - -   PHQ-9 Total Score MyChart 10 (Moderate depression) - -   PHQ-9 Total Score 10 14 0   Some encounter information is confidential and restricted. Go to Review Flowsheets activity to see all data.       If PHQ-9 recheck is 5 or more, route to provider for next steps.    Patient is due for:  PHQ9      Composite cancer screening  Chart review shows that this patient is due/due soon for the following None  Summary:    Patient is due/failing the following:   PHQ9    Action needed:   Patient needs to do PHQ9.    Type of outreach:    Routed to care team for outreach    Questions for provider review:    None                                                                                                                                    Zena Cavazos        Chart routed to Care Team .

## 2019-08-08 ASSESSMENT — PATIENT HEALTH QUESTIONNAIRE - PHQ9
SUM OF ALL RESPONSES TO PHQ QUESTIONS 1-9: 1
SUM OF ALL RESPONSES TO PHQ QUESTIONS 1-9: 1

## 2019-08-09 ASSESSMENT — PATIENT HEALTH QUESTIONNAIRE - PHQ9: SUM OF ALL RESPONSES TO PHQ QUESTIONS 1-9: 1

## 2019-10-04 ENCOUNTER — HEALTH MAINTENANCE LETTER (OUTPATIENT)
Age: 59
End: 2019-10-04

## 2019-10-09 ENCOUNTER — ALLIED HEALTH/NURSE VISIT (OUTPATIENT)
Dept: NURSING | Facility: CLINIC | Age: 59
End: 2019-10-09
Payer: COMMERCIAL

## 2019-10-09 DIAGNOSIS — Z23 NEED FOR PROPHYLACTIC VACCINATION AND INOCULATION AGAINST INFLUENZA: Primary | ICD-10-CM

## 2019-10-09 PROCEDURE — 90682 RIV4 VACC RECOMBINANT DNA IM: CPT

## 2019-10-09 PROCEDURE — 90472 IMMUNIZATION ADMIN EACH ADD: CPT

## 2019-10-09 PROCEDURE — 90750 HZV VACC RECOMBINANT IM: CPT

## 2019-10-09 PROCEDURE — 90471 IMMUNIZATION ADMIN: CPT

## 2019-10-09 PROCEDURE — 99207 ZZC NO CHARGE LOS: CPT

## 2019-12-27 ENCOUNTER — MYC MEDICAL ADVICE (OUTPATIENT)
Dept: FAMILY MEDICINE | Facility: CLINIC | Age: 59
End: 2019-12-27

## 2019-12-27 NOTE — TELEPHONE ENCOUNTER
"MyChart sent and will leave encounter open until read in case of response, then may close.     Instructions adapted from \"Telephone Triage Protocols for Nurses\" Fifth Edition by Stephanie Mcgarry.      Heidi Maldonado RN      "

## 2020-01-03 NOTE — TELEPHONE ENCOUNTER
Patient read [employer] message with no reply.  Will close encounter.      Raphael Sheikh RN

## 2020-02-14 DIAGNOSIS — F51.01 PRIMARY INSOMNIA: ICD-10-CM

## 2020-02-14 RX ORDER — TRAZODONE HYDROCHLORIDE 50 MG/1
50 TABLET, FILM COATED ORAL AT BEDTIME
Qty: 90 TABLET | Refills: 0 | Status: SHIPPED | OUTPATIENT
Start: 2020-02-14 | End: 2020-07-16

## 2020-02-14 NOTE — TELEPHONE ENCOUNTER
"Requested Prescriptions   Pending Prescriptions Disp Refills     traZODone (DESYREL) 50 MG tablet [Pharmacy Med Name: TRAZODONE 50MG TABLETS]  Last Written Prescription Date:  1/30/2019  Last Fill Quantity: 90 tablet,  # refills: 3   Last office visit: 1/30/2019 with prescribing provider:  SHARRI Corbett   Future Office Visit:   90 tablet 3     Sig: TAKE 1 TABLET(50 MG) BY MOUTH AT BEDTIME       Serotonin Modulators Failed - 2/14/2020  8:36 AM        Failed - Recent (12 mo) or future (30 days) visit within the authorizing provider's specialty     Patient has had an office visit with the authorizing provider or a provider within the authorizing providers department within the previous 12 mos or has a future within next 30 days. See \"Patient Info\" tab in inbasket, or \"Choose Columns\" in Meds & Orders section of the refill encounter.              Passed - Medication is active on med list        Passed - Patient is age 18 or older        Passed - No active pregnancy on record        Passed - No positive pregnancy test in past 12 months        "

## 2020-07-16 ENCOUNTER — VIRTUAL VISIT (OUTPATIENT)
Dept: FAMILY MEDICINE | Facility: CLINIC | Age: 60
End: 2020-07-16
Payer: COMMERCIAL

## 2020-07-16 DIAGNOSIS — F51.01 PRIMARY INSOMNIA: ICD-10-CM

## 2020-07-16 DIAGNOSIS — F33.1 MAJOR DEPRESSIVE DISORDER, RECURRENT EPISODE, MODERATE (H): ICD-10-CM

## 2020-07-16 PROCEDURE — 96127 BRIEF EMOTIONAL/BEHAV ASSMT: CPT | Mod: 59 | Performed by: FAMILY MEDICINE

## 2020-07-16 PROCEDURE — 99214 OFFICE O/P EST MOD 30 MIN: CPT | Mod: TEL | Performed by: FAMILY MEDICINE

## 2020-07-16 RX ORDER — VENLAFAXINE HYDROCHLORIDE 37.5 MG/1
CAPSULE, EXTENDED RELEASE ORAL
Qty: 90 CAPSULE | Refills: 0 | Status: SHIPPED | OUTPATIENT
Start: 2020-07-16 | End: 2020-09-02

## 2020-07-16 RX ORDER — TRAZODONE HYDROCHLORIDE 50 MG/1
50 TABLET, FILM COATED ORAL AT BEDTIME
Qty: 90 TABLET | Refills: 1 | Status: SHIPPED | OUTPATIENT
Start: 2020-07-16 | End: 2020-08-31

## 2020-07-16 ASSESSMENT — ANXIETY QUESTIONNAIRES
1. FEELING NERVOUS, ANXIOUS, OR ON EDGE: MORE THAN HALF THE DAYS
5. BEING SO RESTLESS THAT IT IS HARD TO SIT STILL: NOT AT ALL
7. FEELING AFRAID AS IF SOMETHING AWFUL MIGHT HAPPEN: NOT AT ALL
2. NOT BEING ABLE TO STOP OR CONTROL WORRYING: MORE THAN HALF THE DAYS
IF YOU CHECKED OFF ANY PROBLEMS ON THIS QUESTIONNAIRE, HOW DIFFICULT HAVE THESE PROBLEMS MADE IT FOR YOU TO DO YOUR WORK, TAKE CARE OF THINGS AT HOME, OR GET ALONG WITH OTHER PEOPLE: SOMEWHAT DIFFICULT
3. WORRYING TOO MUCH ABOUT DIFFERENT THINGS: SEVERAL DAYS
GAD7 TOTAL SCORE: 7
6. BECOMING EASILY ANNOYED OR IRRITABLE: SEVERAL DAYS

## 2020-07-16 ASSESSMENT — PATIENT HEALTH QUESTIONNAIRE - PHQ9
5. POOR APPETITE OR OVEREATING: SEVERAL DAYS
SUM OF ALL RESPONSES TO PHQ QUESTIONS 1-9: 13

## 2020-07-16 NOTE — PATIENT INSTRUCTIONS
Orders Placed This Encounter     venlafaxine (EFFEXOR-XR) 37.5 MG 24 hr capsule     Sig: Take 1 capsule daily     Dispense:  90 capsule     Refill:  0     traZODone (DESYREL) 50 MG tablet     Sig: Take 1 tablet (50 mg) by mouth At Bedtime     Dispense:  90 tablet     Refill:  1     Ok to take 2 trazodone at bedtime if needed

## 2020-07-16 NOTE — PROGRESS NOTES
"Lyn Santana is a 60 year old female who is being evaluated via a billable telephone visit.      The patient has been notified of following:     \"This telephone visit will be conducted via a call between you and your physician/provider. We have found that certain health care needs can be provided without the need for a physical exam.  This service lets us provide the care you need with a short phone conversation.  If a prescription is necessary we can send it directly to your pharmacy.  If lab work is needed we can place an order for that and you can then stop by our lab to have the test done at a later time.    Telephone visits are billed at different rates depending on your insurance coverage. During this emergency period, for some insurers they may be billed the same as an in-person visit.  Please reach out to your insurance provider with any questions.    If during the course of the call the physician/provider feels a telephone visit is not appropriate, you will not be charged for this service.\"    Patient has given verbal consent for Telephone visit?  Yes    What phone number would you like to be contacted at?  [phone]    How would you like to obtain your AVS? Mail a copy    Subjective     Lyn Santana is a 60 year old female who presents via phone visit today for the following health issues:    HPI    Depression Followup    How are you doing with your depression since your last visit? Worsened Had depression in the past and is thinking it is coming back     Are you having other symptoms that might be associated with depression? Yes:  Fuzziness in thinking, general lack of michelle     Have you had a significant life event?  Job Concerns, changed job in the beginning of March and then started working from home.      Are you feeling anxious or having panic attacks?   Yes:  Anxious about work     Do you have any concerns with your use of alcohol or other drugs? No    Social History     Tobacco Use     " Smoking status: Former Smoker     Packs/day: 2.00     Years: 12.00     Pack years: 24.00     Types: Cigarettes     Start date: 1971     Last attempt to quit: 1984     Years since quittin.0     Smokeless tobacco: Never Used   Substance Use Topics     Alcohol use: Yes     Comment: 4-6 per week     Drug use: No     PHQ 2019   PHQ-9 Total Score 0 1 13   Q9: Thoughts of better off dead/self-harm past 2 weeks Not at all Not at all Not at all     LEONOR-7 SCORE 2017   Total Score - - -   Total Score 15 0 7     Last PHQ-9 2020   1.  Little interest or pleasure in doing things 2   2.  Feeling down, depressed, or hopeless 1   3.  Trouble falling or staying asleep, or sleeping too much 3   4.  Feeling tired or having little energy 3   5.  Poor appetite or overeating 1   6.  Feeling bad about yourself 1   7.  Trouble concentrating 1   8.  Moving slowly or restless 1   Q9: Thoughts of better off dead/self-harm past 2 weeks 0   PHQ-9 Total Score 13   Difficulty at work, home, or with people Somewhat difficult     LEONOR-7  2020   1. Feeling nervous, anxious, or on edge 2   2. Not being able to stop or control worrying 2   3. Worrying too much about different things 1   4. Trouble relaxing 1   5. Being so restless that it is hard to sit still 0   6. Becoming easily annoyed or irritable 1   7. Feeling afraid, as if something awful might happen 0   LEONOR-7 Total Score 7   If you checked any problems, how difficult have they made it for you to do your work, take care of things at home, or get along with other people? Somewhat difficult         Suicide Assessment Five-step Evaluation and Treatment (SAFE-T)      How many servings of fruits and vegetables do you eat daily?  2-3    On average, how many sweetened beverages do you drink each day (Examples: soda, juice, sweet tea, etc.  Do NOT count diet or artificially sweetened beverages)?   0    How many days per week do  you exercise enough to make your heart beat faster? 7    How many minutes a day do you exercise enough to make your heart beat faster? 30 - 60    How many days per week do you miss taking your medication? 0    Concern - Sleep issues  Onset: 3 weeks     Description:   Very tired, only able to sleep for a short period feels like 20 minutes and then is up for the rest of the night.    Intensity: moderate    Progression of Symptoms:  intermittent    Accompanying Signs & Symptoms:  Tired, Funny, lack of michelle     Previous history of similar problem:   Fells similar to when she was first dealing with depression    Precipitating factors:   Worsened by: No    Alleviating factors:  Improved by: No    Therapies Tried and outcome: taking old Trazadone - didn't help a whole lot but maybe a little, wondering if a higher dose would help.  Patient has noted return of anxiety and depression. Initially felt it was only a sleep problem but after taking the PHQ9 she can see it is return of depression.previusly treated effectively with medications.    No suicidal    Did start trazodone but has been minimally helpful         BP Readings from Last 3 Encounters:   01/30/19 122/78   09/21/18 129/69   06/22/18 128/82    Wt Readings from Last 3 Encounters:   01/30/19 57.4 kg (126 lb 8 oz)   09/21/18 51.3 kg (113 lb)   06/22/18 50.3 kg (110 lb 12.8 oz)                    Reviewed and updated as needed this visit by Provider         Review of Systems   Constitutional, HEENT, cardiovascular, pulmonary, gi and gu systems are negative, except as otherwise noted.       Objective   Reported vitals:  LMP  (LMP Unknown)    healthy, alert and no distress  PSYCH: Alert and oriented times 3; coherent speech, normal   rate and volume, able to articulate logical thoughts, able   to abstract reason, no tangential thoughts, no hallucinations   or delusions  Her affect is anxious  RESP: No cough, no audible wheezing, able to talk in full sentences  Remainder  of exam unable to be completed due to telephone visits    Diagnostic Test Results:  none         Assessment/Plan:    1. Major depressive disorder, recurrent episode, moderate (H)  Restart and momitor  - venlafaxine (EFFEXOR-XR) 37.5 MG 24 hr capsule; Take 1 capsule daily  Dispense: 90 capsule; Refill: 0    2. Primary insomnia  Ok to take 1-2 tablets at bedtime  - traZODone (DESYREL) 50 MG tablet; Take 1 tablet (50 mg) by mouth At Bedtime  Dispense: 90 tablet; Refill: 1    Return in about 4 weeks (around 8/13/2020) for Review response to medications.      Phone call duration:  14  Minutes with review of chart/medicatons and discussion with patient    Ez Corbett MD, MD

## 2020-07-17 ENCOUNTER — TELEPHONE (OUTPATIENT)
Dept: FAMILY MEDICINE | Facility: CLINIC | Age: 60
End: 2020-07-17

## 2020-07-17 ASSESSMENT — ANXIETY QUESTIONNAIRES: GAD7 TOTAL SCORE: 7

## 2020-07-17 NOTE — TELEPHONE ENCOUNTER
Ez Corbett MD  P Ne Team Rachel Santana needs 1 month phone follow up to review medications/depression      Copied from CC chart.    Susana Valencia

## 2020-08-04 ENCOUNTER — NURSE TRIAGE (OUTPATIENT)
Dept: FAMILY MEDICINE | Facility: CLINIC | Age: 60
End: 2020-08-04

## 2020-08-04 ENCOUNTER — OFFICE VISIT (OUTPATIENT)
Dept: FAMILY MEDICINE | Facility: CLINIC | Age: 60
End: 2020-08-04
Payer: COMMERCIAL

## 2020-08-04 VITALS
BODY MASS INDEX: 26.06 KG/M2 | OXYGEN SATURATION: 98 % | HEART RATE: 89 BPM | SYSTOLIC BLOOD PRESSURE: 120 MMHG | WEIGHT: 132.75 LBS | TEMPERATURE: 98.1 F | HEIGHT: 60 IN | DIASTOLIC BLOOD PRESSURE: 78 MMHG

## 2020-08-04 DIAGNOSIS — K59.00 CONSTIPATION, UNSPECIFIED CONSTIPATION TYPE: Primary | ICD-10-CM

## 2020-08-04 PROCEDURE — 99213 OFFICE O/P EST LOW 20 MIN: CPT | Performed by: PHYSICIAN ASSISTANT

## 2020-08-04 RX ORDER — ASPIRIN 81 MG
100 TABLET, DELAYED RELEASE (ENTERIC COATED) ORAL
Qty: 30 TABLET | Refills: 0 | Status: SHIPPED | OUTPATIENT
Start: 2020-08-04 | End: 2022-01-03

## 2020-08-04 ASSESSMENT — MIFFLIN-ST. JEOR: SCORE: 1081.16

## 2020-08-04 NOTE — TELEPHONE ENCOUNTER
"  Additional Information    Negative: Abdomen pain is the main symptom and adult male    Negative: Abdomen pain is the main symptom and adult female    Negative: Rectal bleeding or blood in stool is the main symptom    Negative: Patient sounds very sick or weak to the triager    Negative: Constant abdominal pain lasting > 2 hours    Negative: Vomiting bile (green color)    Negative: Vomiting and abdomen looks much more swollen than usual    Unable to have a bowel movement (BM) without using a laxative, suppository, or enema    Negative: Rectal pain or fullness from fecal impaction (rectum full of stool) and NOT better after SITZ bath, suppository or enema    Negative: Abdomen is more swollen than usual    Negative: Last bowel movement (BM) > 4 days ago    Negative: Leaking stool    Negative: Intermittent mild abdominal pain and fever    Negative: Unable to have a bowel movement (BM) without manually removing stool (using finger to pull out stool or perform disimpaction)    Answer Assessment - Initial Assessment Questions  1. STOOL PATTERN OR FREQUENCY: \"How often do you pass bowel movements (BMs)?\"  (Normal range: tid to q 3 days)  \"When was the last BM passed?\"        Last BM saturday  2. STRAINING: \"Do you have to strain to have a BM?\"       yes  3. RECTAL PAIN: \"Does your rectum hurt when the stool comes out?\" If so, ask: \"Do you have hemorrhoids? How bad is the pain?\"  (Scale 1-10; or mild, moderate, severe)      no  4. STOOL COMPOSITION: \"Are the stools hard?\"       unusure  5. BLOOD ON STOOLS: \"Has there been any blood on the toilet tissue or on the surface of the BM?\" If so, ask: \"When was the last time?\"       no  6. CHRONIC CONSTIPATION: \"Is this a new problem for you?\"  If no, ask: \"How long have you had this problem?\" (days, weeks, months)       Few weeks  7. CHANGES IN DIET: \"Have there been any recent changes in your diet?\"       no  8. MEDICATIONS: \"Have you been taking any new medications?\"      " "venaflaxine  9. LAXATIVES: \"Have you been using any laxatives or enemas?\"  If yes, ask \"What, how often, and when was the last time?\"      Laxative last wednesday  10. CAUSE: \"What do you think is causing the constipation?\"         unsure  11. OTHER SYMPTOMS: \"Do you have any other symptoms?\" (e.g., abdominal pain, fever, vomiting)        no  12. PREGNANCY: \"Is there any chance you are pregnant?\" \"When was your last menstrual period?\"        no    Protocols used: CONSTIPATION-A-[place]    "

## 2020-08-04 NOTE — PROGRESS NOTES
"Subjective     Lyn Santana is a 60 year old female who presents to clinic today for the following health issues:    HPI     Constipation     Onset: x 2.5 weeks ago    Description:   Frequency of bowel movements: 1 week.  Stool consistency: soft    Progression of Symptoms:  worsening    Accompanying Signs & Symptoms:  Abdominal pain (cramping?): no   Blood in stool: no   Rectal pain: no   Nausea/vomiting: no   Weight loss or gain: no    History:   History of abdominal surgery: no     Precipitating factors:   Recent use of narcotics, anticholinergics, calcium channel blockers, antacids, or iron supplements: no   Chronic Laxative Use: no          Therapies Tried and outcome: laxative one day and it did not work as well as she wanted it to.    Has been drinking water. Maybe more meat than typical. Less activity than normal during pandemic. Hasn't really tried changing diet, used laxative once which seemed to help a little, but was looking for more relief.  No history of recurrent bowel issues.    Started venlafaxine 7/16/2020 - has possible constipation side effect.    BP Readings from Last 3 Encounters:   08/04/20 120/78   01/30/19 122/78   04/24/17 128/70    Wt Readings from Last 3 Encounters:   08/04/20 60.2 kg (132 lb 12 oz)   01/30/19 57.4 kg (126 lb 8 oz)   04/24/17 54 kg (119 lb)           Reviewed and updated as needed this visit by Provider         Review of Systems   Constitutional, HEENT, cardiovascular, pulmonary, gi and gu systems are negative, except as otherwise noted.      Objective    /78 (BP Location: Right arm, Patient Position: Sitting, Cuff Size: Adult Regular)   Pulse 89   Temp 98.1  F (36.7  C) (Oral)   Ht 1.52 m (4' 11.84\")   Wt 60.2 kg (132 lb 12 oz)   LMP  (LMP Unknown)   SpO2 98%   Breastfeeding No   BMI 26.06 kg/m    Body mass index is 26.06 kg/m .  Physical Exam   GENERAL: healthy, alert and no distress  NECK: no adenopathy, no asymmetry, masses, or scars and thyroid " "normal to palpation  RESP: lungs clear to auscultation - no rales, rhonchi or wheezes  CV: regular rate and rhythm, normal S1 S2, no S3 or S4, no murmur, click or rub, no peripheral edema and peripheral pulses strong  ABDOMEN: soft, nontender, no hepatosplenomegaly, hypoactive bowel sounds, stool felt in LLQ  MS: no gross musculoskeletal defects noted, no edema    Diagnostic Test Results:  none         Assessment & Plan     1. Constipation, unspecified constipation type  Discussed increasing water intake, increasing fiber intake, increasing physical activity. Trying stool softeners, such as colace or other OTC options. Venlafaxine started recently does have constipation as a possible side effect - could be related. Advised she try diet modifications and increasing physical activity. If symptoms persist, recommend she return for re-evaluation.  - docusate sodium (COLACE) 100 MG tablet; Take 1 tablet (100 mg) by mouth 2 times daily  Dispense: 30 tablet; Refill: 0     BMI:   Estimated body mass index is 26.06 kg/m  as calculated from the following:    Height as of this encounter: 1.52 m (4' 11.84\").    Weight as of this encounter: 60.2 kg (132 lb 12 oz).   Weight management plan: Discussed healthy diet and exercise guidelines    Return if symptoms worsen or fail to improve.    Jaylin Abdi PA-C  [facility]    "

## 2020-08-04 NOTE — PATIENT INSTRUCTIONS
Prescription for colace. Try increasing water intake, increasing fiber intake.   If things are not moving, or new symptoms develop, please return to clinic for re-evaluation.       Patient Education     Constipation (Adult)  Constipation means that you have bowel movements that are less frequent than usual. Stools often become very hard and difficult to pass.  Constipation is very common. At some point in life, it affects almost everyone. Since everyone's bowel habits are different, what is constipation to one person may not be to another. Your healthcare provider may do tests to diagnose constipation. It depends on what he or she finds when evaluating you.    Symptoms of constipation include:    Abdominal pain    Bloating    Vomiting    Painful bowel movements    Itching, swelling, bleeding, or pain around the anus  Causes  Constipation can have many causes. These include:    Diet low in fiber    Too much dairy    Not drinking enough liquids    Lack of exercise or physical activity (especially true for older adults)    Changes in lifestyle or daily routine, including pregnancy, aging, work, and travel    Frequent use or misuse of laxatives    Ignoring the urge to have a bowel movement or delaying it until later    Medicines, such as certain prescription pain medicines, iron supplements, antacids, certain antidepressants, and calcium supplements    Diseases like irritable bowel syndrome, bowel obstructions, stroke, diabetes, thyroid disease, Parkinson disease, hemorrhoids, and colon cancer  Complications  Potential complications of constipation can include:    Hemorrhoids    Rectal bleeding from hemorrhoids or anal fissures (skin tears)    Hernias    Dependency on laxatives    Chronic constipation    Fecal impaction, a severe form of constipation in which a large amount of hard stool is in your rectum that you can't pass    Bowel obstruction or perforation  Home care  All treatment should be done after talking with  your healthcare provider. This is especially true if you have another medical problems, are taking prescription medicines, or are an older adult. Treatment most often involves lifestyle changes. You may also need medicines. Your healthcare provider will tell you which will work best for you. Follow the advice below to help avoid this problem in the future.  Lifestyle changes  These lifestyle changes can help prevent constipation:    Diet. Eat a high-fiber diet, with fresh fruit and vegetables, and reduce dairy intake, meats, and processed foods    Fluids. It's important to get enough fluids each day. Drink plenty of water when you eat more fiber. If you are on diet that limits the amount of fluid you can have, talk about this with your healthcare provider.    Regular exercise. Check with your healthcare provider first.  Medicines  Take any medicines as directed. Some laxatives are safe to use only every now and then. Others can be taken on a regular basis. While laxatives don't cause bowel dependence, they are treating the symptoms. So your constipation may return if you don't make other changes. Talk with your healthcare provider or pharmacist if you have questions.  Prescription pain medicines can cause constipation. If you are taking this kind of medicine, ask your healthcare provider if you should also take a stool softener.  Medicines you may take to treat constipation include:    Fiber supplements    Stool softeners    Laxatives    Enemas    Rectal suppositories  Follow-up care  Follow up with your healthcare provider if symptoms don't get better in the next few days. You may need to have more tests or see a specialist.  Call 911  Call 911 if any of these occur:    Trouble breathing    Stiff, rigid abdomen that is severely painful to touch    Confusion    Fainting or loss of consciousness    Rapid heart rate    Chest pain  When to seek medical advice  Call your healthcare provider right away if any of these  occur:    Fever of 100.4 F (38 C) or higher, or as directed by your healthcare provider    Failure to resume normal bowel movements    Pain in your abdomen or back gets worse    Nausea or vomiting    Swelling in your abdomen    Blood in the stool    Black, tarry stool    Involuntary weight loss    Weakness  Date Last Reviewed: 6/1/2018 2000-2019 [employer]. [address] [place] [address]. All rights reserved. This information is not intended as a substitute for professional medical care. Always follow your healthcare professional's instructions.           Patient Education     Treating Constipation    Constipation is a common and often uncomfortable problem. Constipation means you have bowel movements fewer than 3 times per week, or strain to pass hard, dry stool. It can last a short time. Or it can be a problem that never seems to go away. The good news is that it can often be treated and controlled.  Eat more fiber  One of the best ways to help treat constipation is to increase your fiber intake. You can do this either through diet or by using fiber supplements. Fiber (in whole grains, fruits, and vegetables) adds bulk and absorbs water to soften the stool. This helps the stool pass through the colon more easily. When you increase your fiber intake, do it slowly to avoid side effects such as bloating. Also increase the amount of water that you drink. Eating more of the following foods can add fiber to your diet.    High-fiber cereals    Whole grains, bran, and brown rice    Vegetables such as carrots, broccoli, and greens    Fresh fruits (especially apples, pears, and dried fruits like raisins and apricots)    Nuts and legumes (especially beans such as lentils, kidney beans, and lima beans)  Get physically active  Exercise helps improve the working of your colon which helps ease constipation. Try to get some physical activity every day. If you haven t been active for a while, talk to  your healthcare provider before starting again.  Laxatives  Your healthcare provider may suggest an over-the-counter product to help ease your constipation. He or she may suggest the use of bulk-forming agents or laxatives. The use of laxatives, if used as directed, is common and safe. Follow directions carefully when using them. See your healthcare provider for new-onset constipation, or long-term constipation, to rule out other causes such as medicines or thyroid disease.  Date Last Reviewed: 7/1/2016 2000-2019 [employer]. [address] [place] [address], [place] [address]. All rights reserved. This information is not intended as a substitute for professional medical care. Always follow your healthcare professional's instructions.

## 2020-08-04 NOTE — TELEPHONE ENCOUNTER
Reason for call:  Patient reporting a symptom    Symptom or request: constipation      Duration (how long have symptoms been present): 2 weeks     Have you been treated for this before? No    Phone Number patient can be reached at:  Home number on file [phone] (home)    Best Time:  any    Can we leave a detailed message on this number:  YES    Call taken on 8/4/2020 at 7:28 AM by Cindy Sanchez

## 2020-08-05 ENCOUNTER — VIRTUAL VISIT (OUTPATIENT)
Dept: FAMILY MEDICINE | Facility: CLINIC | Age: 60
End: 2020-08-05
Payer: COMMERCIAL

## 2020-08-05 DIAGNOSIS — F33.1 MAJOR DEPRESSIVE DISORDER, RECURRENT EPISODE, MODERATE (H): Primary | ICD-10-CM

## 2020-08-05 PROCEDURE — 99213 OFFICE O/P EST LOW 20 MIN: CPT | Mod: TEL | Performed by: FAMILY MEDICINE

## 2020-08-05 PROCEDURE — 96127 BRIEF EMOTIONAL/BEHAV ASSMT: CPT | Mod: 59 | Performed by: FAMILY MEDICINE

## 2020-08-05 RX ORDER — BUSPIRONE HYDROCHLORIDE 10 MG/1
TABLET ORAL
Qty: 90 TABLET | Refills: 3 | Status: SHIPPED | OUTPATIENT
Start: 2020-08-05 | End: 2020-09-02

## 2020-08-05 ASSESSMENT — ANXIETY QUESTIONNAIRES
6. BECOMING EASILY ANNOYED OR IRRITABLE: SEVERAL DAYS
3. WORRYING TOO MUCH ABOUT DIFFERENT THINGS: SEVERAL DAYS
1. FEELING NERVOUS, ANXIOUS, OR ON EDGE: NEARLY EVERY DAY
IF YOU CHECKED OFF ANY PROBLEMS ON THIS QUESTIONNAIRE, HOW DIFFICULT HAVE THESE PROBLEMS MADE IT FOR YOU TO DO YOUR WORK, TAKE CARE OF THINGS AT HOME, OR GET ALONG WITH OTHER PEOPLE: VERY DIFFICULT
2. NOT BEING ABLE TO STOP OR CONTROL WORRYING: MORE THAN HALF THE DAYS
5. BEING SO RESTLESS THAT IT IS HARD TO SIT STILL: SEVERAL DAYS
7. FEELING AFRAID AS IF SOMETHING AWFUL MIGHT HAPPEN: SEVERAL DAYS
GAD7 TOTAL SCORE: 11

## 2020-08-05 ASSESSMENT — PATIENT HEALTH QUESTIONNAIRE - PHQ9
5. POOR APPETITE OR OVEREATING: MORE THAN HALF THE DAYS
SUM OF ALL RESPONSES TO PHQ QUESTIONS 1-9: 17

## 2020-08-05 NOTE — PATIENT INSTRUCTIONS
Orders Placed This Encounter     MENTAL HEALTH REFERRAL  - Adult; Outpatient Treatment; Individual/Couples/Family/Group Therapy/Health Psychology; [facility] [place]: [facility] [phone]; We will contact you to schedule the appointment or please call with any questions     Referral Priority:   Routine     Referral Type:   Mental Health Outpatient     Number of Visits Requested:   1     busPIRone (BUSPAR) 10 MG tablet     Sig: Start at 5 mg 3 times per day for 5 day then increase to 10 mg 3 times per     Dispense:  90 tablet     Refill:  3

## 2020-08-05 NOTE — PROGRESS NOTES
"Lyn Santana is a 60 year old female who is being evaluated via a billable telephone visit.      The patient has been notified of following:     \"This telephone visit will be conducted via a call between you and your physician/provider. We have found that certain health care needs can be provided without the need for a physical exam.  This service lets us provide the care you need with a short phone conversation.  If a prescription is necessary we can send it directly to your pharmacy.  If lab work is needed we can place an order for that and you can then stop by our lab to have the test done at a later time.    Telephone visits are billed at different rates depending on your insurance coverage. During this emergency period, for some insurers they may be billed the same as an in-person visit.  Please reach out to your insurance provider with any questions.    If during the course of the call the physician/provider feels a telephone visit is not appropriate, you will not be charged for this service.\"    Patient has given verbal consent for Telephone visit?  Yes    What phone number would you like to be contacted at? [phone]    How would you like to obtain your AVS? Mail a copy    Subjective     Lyn Santana is a 60 year old female who presents via phone visit today for the following health issues:    HPI    Depression and Anxiety Follow-Up    How are you doing with your depression since your last visit? No change    How are you doing with your anxiety since your last visit?  Worsened     Are you having other symptoms that might be associated with depression or anxiety? No    Have you had a significant life event? No     Do you have any concerns with your use of alcohol or other drugs? No    Social History     Tobacco Use     Smoking status: Former Smoker     Packs/day: 2.00     Years: 12.00     Pack years: 24.00     Types: Cigarettes     Start date: 6/1/1971     Last attempt to quit: 6/23/1984     Years " since quittin.1     Smokeless tobacco: Never Used   Substance Use Topics     Alcohol use: Yes     Comment: 4-6 per week     Drug use: No     PHQ 2019   PHQ-9 Total Score 1 13 17   Q9: Thoughts of better off dead/self-harm past 2 weeks Not at all Not at all Not at all     LEONOR-7 SCORE 2019   Total Score - - -   Total Score 0 7 11     Last PHQ-9 2020   1.  Little interest or pleasure in doing things 3   2.  Feeling down, depressed, or hopeless 2   3.  Trouble falling or staying asleep, or sleeping too much 3   4.  Feeling tired or having little energy 2   5.  Poor appetite or overeating 0   6.  Feeling bad about yourself 2   7.  Trouble concentrating 2   8.  Moving slowly or restless 3   Q9: Thoughts of better off dead/self-harm past 2 weeks 0   PHQ-9 Total Score 17   Difficulty at work, home, or with people Somewhat difficult     LEONOR-7  2020   1. Feeling nervous, anxious, or on edge 3   2. Not being able to stop or control worrying 2   3. Worrying too much about different things 1   4. Trouble relaxing 2   5. Being so restless that it is hard to sit still 1   6. Becoming easily annoyed or irritable 1   7. Feeling afraid, as if something awful might happen 1   LEONOR-7 Total Score 11   If you checked any problems, how difficult have they made it for you to do your work, take care of things at home, or get along with other people? Very difficult       Suicide Assessment Five-step Evaluation and Treatment (SAFE-T)      How many servings of fruits and vegetables do you eat daily?  2-3    On average, how many sweetened beverages do you drink each day (Examples: soda, juice, sweet tea, etc.  Do NOT count diet or artificially sweetened beverages)?   0    How many days per week do you exercise enough to make your heart beat faster? 4    How many minutes a day do you exercise enough to make your heart beat faster? 30 - 60    How many days per week do you miss taking your  medication? 0        She does not feel any improvement with restarting Effexor.    Poor sleep    No specific triggers or worries  Reviewed restarting counseling and she is in favor of this      Reviewed records and previously treated with Zoloft Effexor and buspar.    She does not recall any side effects but records show possible difficulty with higher doses of Effexor    Discussed medication options  At this time we will initiate Buspar. Anxiety is primary component.    We discussed possible sleep medication changes(nortyptiline) but will hold off at this time with the starting of Buspar..     We will follow up in 1 month for recheck    BP Readings from Last 3 Encounters:   08/04/20 120/78   01/30/19 122/78   09/21/18 129/69    Wt Readings from Last 3 Encounters:   08/04/20 60.2 kg (132 lb 12 oz)   01/30/19 57.4 kg (126 lb 8 oz)   09/21/18 51.3 kg (113 lb)                    Reviewed and updated as needed this visit by Provider         Review of Systems   Constitutional, HEENT, cardiovascular, pulmonary, gi and gu systems are negative, except as otherwise noted.       Objective   Reported vitals:  LMP  (LMP Unknown)    alert and no distress  PSYCH: Alert and oriented times 3; coherent speech, normal   rate and volume, able to articulate logical thoughts, able   to abstract reason, no tangential thoughts, no hallucinations   or delusions  Her affect is anxious  RESP: No cough, no audible wheezing, able to talk in full sentences  Remainder of exam unable to be completed due to telephone visits    Diagnostic Test Results:  none         Assessment/Plan:    1. Major depressive disorder, recurrent episode, moderate (H)    - busPIRone (BUSPAR) 10 MG tablet; Start at 5 mg 3 times per day for 5 day then increase to 10 mg 3 times per  Dispense: 90 tablet; Refill: 3  - MENTAL HEALTH REFERRAL  - Adult; Outpatient Treatment; Individual/Couples/Family/Group [place]/Health [place] [facility] [place] [facility]  [phone]; We will contact you to schedule the appointment or please call with any questions    Return in about 4 weeks (around 9/2/2020) for Review response to medications.      Phone call duration:  18 minutes    Ez Corbett MD, MD

## 2020-08-06 ASSESSMENT — ANXIETY QUESTIONNAIRES: GAD7 TOTAL SCORE: 11

## 2020-09-02 ENCOUNTER — VIRTUAL VISIT (OUTPATIENT)
Dept: FAMILY MEDICINE | Facility: CLINIC | Age: 60
End: 2020-09-02
Payer: COMMERCIAL

## 2020-09-02 DIAGNOSIS — L65.9 HAIR LOSS: ICD-10-CM

## 2020-09-02 DIAGNOSIS — Z12.11 SCREEN FOR COLON CANCER: ICD-10-CM

## 2020-09-02 DIAGNOSIS — F33.1 MAJOR DEPRESSIVE DISORDER, RECURRENT EPISODE, MODERATE (H): Primary | ICD-10-CM

## 2020-09-02 PROCEDURE — 99214 OFFICE O/P EST MOD 30 MIN: CPT | Mod: TEL | Performed by: FAMILY MEDICINE

## 2020-09-02 RX ORDER — BUSPIRONE HYDROCHLORIDE 10 MG/1
TABLET ORAL
Qty: 90 TABLET | Refills: 3 | Status: CANCELLED | OUTPATIENT
Start: 2020-09-02

## 2020-09-02 RX ORDER — VENLAFAXINE HYDROCHLORIDE 37.5 MG/1
CAPSULE, EXTENDED RELEASE ORAL
Qty: 60 CAPSULE | Refills: 5 | Status: SHIPPED | OUTPATIENT
Start: 2020-09-02 | End: 2020-09-10

## 2020-09-02 ASSESSMENT — ANXIETY QUESTIONNAIRES
7. FEELING AFRAID AS IF SOMETHING AWFUL MIGHT HAPPEN: SEVERAL DAYS
2. NOT BEING ABLE TO STOP OR CONTROL WORRYING: MORE THAN HALF THE DAYS
5. BEING SO RESTLESS THAT IT IS HARD TO SIT STILL: MORE THAN HALF THE DAYS
3. WORRYING TOO MUCH ABOUT DIFFERENT THINGS: SEVERAL DAYS
1. FEELING NERVOUS, ANXIOUS, OR ON EDGE: NEARLY EVERY DAY
IF YOU CHECKED OFF ANY PROBLEMS ON THIS QUESTIONNAIRE, HOW DIFFICULT HAVE THESE PROBLEMS MADE IT FOR YOU TO DO YOUR WORK, TAKE CARE OF THINGS AT HOME, OR GET ALONG WITH OTHER PEOPLE: VERY DIFFICULT
GAD7 TOTAL SCORE: 12
6. BECOMING EASILY ANNOYED OR IRRITABLE: SEVERAL DAYS

## 2020-09-02 ASSESSMENT — PATIENT HEALTH QUESTIONNAIRE - PHQ9
5. POOR APPETITE OR OVEREATING: MORE THAN HALF THE DAYS
SUM OF ALL RESPONSES TO PHQ QUESTIONS 1-9: 15

## 2020-09-02 NOTE — Clinical Note
Please set up phone follow up in 2 weeks and also a lab only appt in the next 1-2 weeks  Taqueria Corbett MD

## 2020-09-02 NOTE — PATIENT INSTRUCTIONS
Orders Placed This Encounter     **TSH with free T4 reflex FUTURE anytime     Last Lab Result: TSH (mU/L)       Date                     Value                 04/10/2017               2.22             ----------     Standing Status:   Future     Standing Expiration Date:   9/2/2021     **Basic metabolic panel FUTURE anytime     Standing Status:   Future     Standing Expiration Date:   9/2/2021     Fecal colorectal cancer screen (FIT)     Standing Status:   Future     Standing Expiration Date:   9/2/2021     venlafaxine (EFFEXOR-XR) 37.5 MG 24 hr capsule     Sig: Take 1 capsule 2 times per day     Dispense:  60 capsule     Refill:  5     We will call to make a follow up appt in 2 weeks to review medications and to set up a lab only appointment

## 2020-09-02 NOTE — PROGRESS NOTES
"Lyn Santana is a 60 year old female who is being evaluated via a billable telephone visit.      The patient has been notified of following:     \"This telephone visit will be conducted via a call between you and your physician/provider. We have found that certain health care needs can be provided without the need for a physical exam.  This service lets us provide the care you need with a short phone conversation.  If a prescription is necessary we can send it directly to your pharmacy.  If lab work is needed we can place an order for that and you can then stop by our lab to have the test done at a later time.    Telephone visits are billed at different rates depending on your insurance coverage. During this emergency period, for some insurers they may be billed the same as an in-person visit.  Please reach out to your insurance provider with any questions.    If during the course of the call the physician/provider feels a telephone visit is not appropriate, you will not be charged for this service.\"    Patient has given verbal consent for Telephone visit?  Yes    What phone number would you like to be contacted at? [phone]    How would you like to obtain your AVS? Mail a copy    Subjective     Lyn Santana is a 60 year old female who presents via phone visit today for the following health issues:    HPI    Depression and Anxiety Follow-Up    How are you doing with your depression since your last visit? No change    How are you doing with your anxiety since your last visit?  Worsened     Are you having other symptoms that might be associated with depression or anxiety? Yes:  hair seems to be falling out     Have you had a significant life event? No     Do you have any concerns with your use of alcohol or other drugs? No    Social History     Tobacco Use     Smoking status: Former Smoker     Packs/day: 2.00     Years: 12.00     Pack years: 24.00     Types: Cigarettes     Start date: 6/1/1971     Last " attempt to quit: 1984     Years since quittin.2     Smokeless tobacco: Never Used   Substance Use Topics     Alcohol use: Yes     Comment: 4-6 per week     Drug use: No     PHQ 2019   PHQ-9 Total Score 1 13 17   Q9: Thoughts of better off dead/self-harm past 2 weeks Not at all Not at all Not at all   Some encounter information is confidential and restricted. Go to Review Flowsheets activity to see all data.     LEONOR-7 SCORE 2019   Total Score - - -   Total Score 0 7 11     No improvement with the Buspar. She has had improved sleep wioth 100 mg of trazodone.    She has started counseling but has only had 1 session.    She has noted diffuse hair loss recently. No other thyroid symptoms other than anxiety    Suicide Assessment Five-step Evaluation and Treatment (SAFE-T)      How many servings of fruits and vegetables do you eat daily?  2-3    On average, how many sweetened beverages do you drink each day (Examples: soda, juice, sweet tea, etc.  Do NOT count diet or artificially sweetened beverages)?   0    How many days per week do you exercise enough to make your heart beat faster? 5    How many minutes a day do you exercise enough to make your heart beat faster? 10 - 19  How many days per week do you miss taking your medication? 1    What makes it hard for you to take your medications?  Patient forgets to take Buspirone in the middle of the day.             Review of Systems   Constitutional, HEENT, cardiovascular, pulmonary, gi and gu systems are negative, except as otherwise noted.       Objective          Vitals:  No vitals were obtained today due to virtual visit.    alert and no distress  PSYCH: Alert and oriented times 3; coherent speech, normal   rate and volume, able to articulate logical thoughts, able   to abstract reason, no tangential thoughts, no hallucinations   or delusions  Her affect is anxious  RESP: No cough, no audible wheezing, able to talk  in full sentences  Remainder of exam unable to be completed due to telephone visits            Assessment/Plan:    Assessment & Plan   Problem List Items Addressed This Visit     None      Visit Diagnoses     Major depressive disorder, recurrent episode, moderate (H)    -  Primary    Relevant Medications    venlafaxine (EFFEXOR-XR) 37.5 MG 24 hr capsule    Other Relevant Orders    **TSH with free T4 reflex FUTURE anytime    **Basic metabolic panel FUTURE anytime    Screen for colon cancer        Relevant Orders    Fecal colorectal cancer screen (FIT)    Hair loss        Relevant Orders    **TSH with free T4 reflex FUTURE anytime               We will increase Effexor. discontinue buspar-not helpful. Check TSH  Patient Instructions     Orders Placed This Encounter     **TSH with free T4 reflex FUTURE anytime     Last Lab Result: TSH (mU/L)       Date                     Value                 04/10/2017               2.22             ----------     Standing Status:   Future     Standing Expiration Date:   9/2/2021     **Basic metabolic panel FUTURE anytime     Standing Status:   Future     Standing Expiration Date:   9/2/2021     Fecal colorectal cancer screen (FIT)     Standing Status:   Future     Standing Expiration Date:   9/2/2021     venlafaxine (EFFEXOR-XR) 37.5 MG 24 hr capsule     Sig: Take 1 capsule 2 times per day     Dispense:  60 capsule     Refill:  5     We will call to make a follow up appt in 2 weeks to review medications and to set up a lab only appointment    Return today (on 9/2/2020) for Review response to medications.    Ez Corbett MD, MD  [facility]      12  minutes with chart review, lab review and discussion with patient

## 2020-09-03 ASSESSMENT — ANXIETY QUESTIONNAIRES: GAD7 TOTAL SCORE: 12

## 2020-09-04 ENCOUNTER — TELEPHONE (OUTPATIENT)
Dept: FAMILY MEDICINE | Facility: CLINIC | Age: 60
End: 2020-09-04

## 2020-09-04 NOTE — TELEPHONE ENCOUNTER
PA Initiation    Medication: venlafaxine (EFFEXOR-XR) 37.5 MG 24 hr capsule   Insurance Company: [email or web address] - Phone [phone] Fax [phone]  Pharmacy Filling the Rx: [id number] - [place], [place] - [facility] [address] AT [facility] [address]  Filling Pharmacy Phone: [phone]  Filling Pharmacy Fax: [phone]  Start Date: 9/4/2020

## 2020-09-04 NOTE — TELEPHONE ENCOUNTER
Prior Authorization Retail Medication Request    Medication/Dose: venlafaxine (EFFEXOR-XR) 37.5 MG 24 hr capsule     ICD code (if different than what is on RX):  anastacia  Previously Tried and Failed:  anastacia  Rationale:  na    Insurance Name:  anastacia  Insurance ID: [id number]      Pharmacy Information (if different than what is on RX)  Name:  [facility]  Phone:  anastacia

## 2020-09-04 NOTE — TELEPHONE ENCOUNTER
PRIOR AUTHORIZATION DENIED    Medication: venlafaxine (EFFEXOR-XR) 37.5 MG 24 hr capsule     Denial Date: 9/4/2020    Denial Rational: Patient's plan only allows 1 capsule per day- she will need to use 75mg capsule.    Appeal Information: If provider would like to appeal this decision we will need a detailed letter of medical necessity to start the process. Then re-route this request back to the PA pool.

## 2020-09-09 ENCOUNTER — MYC MEDICAL ADVICE (OUTPATIENT)
Dept: FAMILY MEDICINE | Facility: CLINIC | Age: 60
End: 2020-09-09

## 2020-09-09 DIAGNOSIS — F33.1 MAJOR DEPRESSIVE DISORDER, RECURRENT EPISODE, MODERATE (H): Primary | ICD-10-CM

## 2020-09-10 DIAGNOSIS — F33.1 MAJOR DEPRESSIVE DISORDER, RECURRENT EPISODE, MODERATE (H): ICD-10-CM

## 2020-09-10 DIAGNOSIS — L65.9 HAIR LOSS: ICD-10-CM

## 2020-09-10 LAB
ANION GAP SERPL CALCULATED.3IONS-SCNC: 8 MMOL/L (ref 3–14)
BUN SERPL-MCNC: 20 MG/DL (ref 7–30)
CALCIUM SERPL-MCNC: 9.2 MG/DL (ref 8.5–10.1)
CHLORIDE SERPL-SCNC: 105 MMOL/L (ref 94–109)
CO2 SERPL-SCNC: 26 MMOL/L (ref 20–32)
CREAT SERPL-MCNC: 0.86 MG/DL (ref 0.52–1.04)
GFR SERPL CREATININE-BSD FRML MDRD: 72 ML/MIN/{1.73_M2}
GLUCOSE SERPL-MCNC: 115 MG/DL (ref 70–99)
POTASSIUM SERPL-SCNC: 4.3 MMOL/L (ref 3.4–5.3)
SODIUM SERPL-SCNC: 139 MMOL/L (ref 133–144)
TSH SERPL DL<=0.005 MIU/L-ACNC: 1.27 MU/L (ref 0.4–4)

## 2020-09-10 PROCEDURE — 80048 BASIC METABOLIC PNL TOTAL CA: CPT | Performed by: FAMILY MEDICINE

## 2020-09-10 PROCEDURE — 84443 ASSAY THYROID STIM HORMONE: CPT | Performed by: FAMILY MEDICINE

## 2020-09-10 PROCEDURE — 36415 COLL VENOUS BLD VENIPUNCTURE: CPT | Performed by: FAMILY MEDICINE

## 2020-09-10 RX ORDER — VENLAFAXINE HYDROCHLORIDE 75 MG/1
75 TABLET, EXTENDED RELEASE ORAL DAILY
Qty: 90 TABLET | Refills: 3 | Status: SHIPPED | OUTPATIENT
Start: 2020-09-10 | End: 2022-01-03

## 2020-09-16 ENCOUNTER — VIRTUAL VISIT (OUTPATIENT)
Dept: FAMILY MEDICINE | Facility: CLINIC | Age: 60
End: 2020-09-16
Payer: COMMERCIAL

## 2020-09-16 DIAGNOSIS — F51.01 PRIMARY INSOMNIA: ICD-10-CM

## 2020-09-16 DIAGNOSIS — F33.1 MAJOR DEPRESSIVE DISORDER, RECURRENT EPISODE, MODERATE (H): Primary | ICD-10-CM

## 2020-09-16 PROCEDURE — 99213 OFFICE O/P EST LOW 20 MIN: CPT | Mod: TEL | Performed by: FAMILY MEDICINE

## 2020-09-16 ASSESSMENT — ANXIETY QUESTIONNAIRES
7. FEELING AFRAID AS IF SOMETHING AWFUL MIGHT HAPPEN: SEVERAL DAYS
2. NOT BEING ABLE TO STOP OR CONTROL WORRYING: NEARLY EVERY DAY
IF YOU CHECKED OFF ANY PROBLEMS ON THIS QUESTIONNAIRE, HOW DIFFICULT HAVE THESE PROBLEMS MADE IT FOR YOU TO DO YOUR WORK, TAKE CARE OF THINGS AT HOME, OR GET ALONG WITH OTHER PEOPLE: SOMEWHAT DIFFICULT
3. WORRYING TOO MUCH ABOUT DIFFERENT THINGS: SEVERAL DAYS
6. BECOMING EASILY ANNOYED OR IRRITABLE: SEVERAL DAYS
1. FEELING NERVOUS, ANXIOUS, OR ON EDGE: MORE THAN HALF THE DAYS
GAD7 TOTAL SCORE: 11
5. BEING SO RESTLESS THAT IT IS HARD TO SIT STILL: NOT AT ALL

## 2020-09-16 ASSESSMENT — PATIENT HEALTH QUESTIONNAIRE - PHQ9: 5. POOR APPETITE OR OVEREATING: NEARLY EVERY DAY

## 2020-09-16 NOTE — PROGRESS NOTES
"Lyn Santana is a 60 year old female who is being evaluated via a billable telephone visit.      The patient has been notified of following:     \"This telephone visit will be conducted via a call between you and your physician/provider. We have found that certain health care needs can be provided without the need for a physical exam.  This service lets us provide the care you need with a short phone conversation.  If a prescription is necessary we can send it directly to your pharmacy.  If lab work is needed we can place an order for that and you can then stop by our lab to have the test done at a later time.    Telephone visits are billed at different rates depending on your insurance coverage. During this emergency period, for some insurers they may be billed the same as an in-person visit.  Please reach out to your insurance provider with any questions.    If during the course of the call the physician/provider feels a telephone visit is not appropriate, you will not be charged for this service.\"    Patient has given verbal consent for Telephone visit?  Yes    What phone number would you like to be contacted at? [phone]    How would you like to obtain your AVS? [place]    Subjective     Lyn Santana is a 60 year old female who presents via phone visit today for the following health issues:    HPI    Depression and Anxiety Follow-Up    How are you doing with your depression since your last visit? No change    How are you doing with your anxiety since your last visit?  No change    Are you having other symptoms that might be associated with depression or anxiety? Yes:  hair is falling out     Have you had a significant life event? No     Do you have any concerns with your use of alcohol or other drugs? No    Social History     Tobacco Use     Smoking status: Former Smoker     Packs/day: 2.00     Years: 12.00     Pack years: 24.00     Types: Cigarettes     Start date: 6/1/1971     Last attempt to quit: " 1984     Years since quittin.2     Smokeless tobacco: Never Used   Substance Use Topics     Alcohol use: Yes     Comment: 4-6 per week     Drug use: No     PHQ 2020   PHQ-9 Total Score 17 15 -   Q9: Thoughts of better off dead/self-harm past 2 weeks Not at all Not at all Not at all     LEONOR-7 SCORE 2020   Total Score - - -   Total Score 11 12 11         Suicide Assessment Five-step Evaluation and Treatment (SAFE-T)      How many servings of fruits and vegetables do you eat daily?  2-3    On average, how many sweetened beverages do you drink each day (Examples: soda, juice, sweet tea, etc.  Do NOT count diet or artificially sweetened beverages)?   0    How many days per week do you exercise enough to make your heart beat faster? 4    How many minutes a day do you exercise enough to make your heart beat faster? 20 - 29    How many days per week do you miss taking your medication? 0         Overall no significant changes. No side effects with medications. She has 3rd counseling session this week.     More upbeat during our conversation.     Review of Systems   Constitutional, HEENT, cardiovascular, pulmonary, gi and gu systems are negative, except as otherwise noted.       Objective          Vitals:  No vitals were obtained today due to virtual visit.    healthy, alert and no distress  PSYCH: Alert and oriented times 3; coherent speech, normal   rate and volume, able to articulate logical thoughts, able   to abstract reason, no tangential thoughts, no hallucinations   or delusions  Her affect is normal  RESP: No cough, no audible wheezing, able to talk in full sentences  Remainder of exam unable to be completed due to telephone visits            Assessment/Plan:    Assessment & Plan     Lyn was seen today for depression and anxiety.    Diagnoses and all orders for this visit:    Major depressive disorder, recurrent episode, moderate (H)    Primary  insomnia         Stable. Slow improvement ,no side effects with medication changes    Patient Instructions    Continue present medications        Return in about 4 weeks (around 10/14/2020) for Review response to medications.  Phone visit is ok.   Ez Corbett MD, MD  [facility]    Phone call duration:  5 minutes

## 2020-09-17 ASSESSMENT — ANXIETY QUESTIONNAIRES: GAD7 TOTAL SCORE: 11

## 2020-10-14 ENCOUNTER — VIRTUAL VISIT (OUTPATIENT)
Dept: FAMILY MEDICINE | Facility: CLINIC | Age: 60
End: 2020-10-14
Payer: COMMERCIAL

## 2020-10-14 DIAGNOSIS — F41.9 ANXIETY: ICD-10-CM

## 2020-10-14 DIAGNOSIS — F33.1 MAJOR DEPRESSIVE DISORDER, RECURRENT EPISODE, MODERATE (H): Primary | ICD-10-CM

## 2020-10-14 PROCEDURE — 96127 BRIEF EMOTIONAL/BEHAV ASSMT: CPT | Performed by: FAMILY MEDICINE

## 2020-10-14 PROCEDURE — 99214 OFFICE O/P EST MOD 30 MIN: CPT | Mod: TEL | Performed by: FAMILY MEDICINE

## 2020-10-14 RX ORDER — HYDROXYZINE PAMOATE 50 MG/1
50 CAPSULE ORAL 3 TIMES DAILY PRN
Qty: 90 CAPSULE | Refills: 3 | Status: SHIPPED | OUTPATIENT
Start: 2020-10-14 | End: 2022-01-03

## 2020-10-14 ASSESSMENT — ANXIETY QUESTIONNAIRES
2. NOT BEING ABLE TO STOP OR CONTROL WORRYING: MORE THAN HALF THE DAYS
7. FEELING AFRAID AS IF SOMETHING AWFUL MIGHT HAPPEN: MORE THAN HALF THE DAYS
5. BEING SO RESTLESS THAT IT IS HARD TO SIT STILL: SEVERAL DAYS
6. BECOMING EASILY ANNOYED OR IRRITABLE: SEVERAL DAYS
1. FEELING NERVOUS, ANXIOUS, OR ON EDGE: NEARLY EVERY DAY
IF YOU CHECKED OFF ANY PROBLEMS ON THIS QUESTIONNAIRE, HOW DIFFICULT HAVE THESE PROBLEMS MADE IT FOR YOU TO DO YOUR WORK, TAKE CARE OF THINGS AT HOME, OR GET ALONG WITH OTHER PEOPLE: VERY DIFFICULT
GAD7 TOTAL SCORE: 14
3. WORRYING TOO MUCH ABOUT DIFFERENT THINGS: MORE THAN HALF THE DAYS

## 2020-10-14 ASSESSMENT — PATIENT HEALTH QUESTIONNAIRE - PHQ9
SUM OF ALL RESPONSES TO PHQ QUESTIONS 1-9: 18
5. POOR APPETITE OR OVEREATING: NEARLY EVERY DAY

## 2020-10-14 NOTE — PROGRESS NOTES
"Lyn Santana is a 60 year old female who is being evaluated via a billable telephone visit.      The patient has been notified of following:     \"This telephone visit will be conducted via a call between you and your physician/provider. We have found that certain health care needs can be provided without the need for a physical exam.  This service lets us provide the care you need with a short phone conversation.  If a prescription is necessary we can send it directly to your pharmacy.  If lab work is needed we can place an order for that and you can then stop by our lab to have the test done at a later time.    Telephone visits are billed at different rates depending on your insurance coverage. During this emergency period, for some insurers they may be billed the same as an in-person visit.  Please reach out to your insurance provider with any questions.    If during the course of the call the physician/provider feels a telephone visit is not appropriate, you will not be charged for this service.\"    Patient has given verbal consent for Telephone visit?  Yes    What phone number would you like to be contacted at? [phone]    How would you like to obtain your AVS? Mail a copy    Subjective     Lyn Santana is a 60 year old female who presents via phone visit today for the following health issues:    HPI     Depression and Anxiety Follow-Up    How are you doing with your depression since your last visit? No change    How are you doing with your anxiety since your last visit?  Worsened     Are you having other symptoms that might be associated with depression or anxiety? Yes:  feels more tense    Have you had a significant life event? No     Do you have any concerns with your use of alcohol or other drugs? No    Social History     Tobacco Use     Smoking status: Former Smoker     Packs/day: 2.00     Years: 12.00     Pack years: 24.00     Types: Cigarettes     Start date: 6/1/1971     Quit date: 6/23/1984 "     Years since quittin.3     Smokeless tobacco: Never Used   Substance Use Topics     Alcohol use: Yes     Comment: 4-6 per week     Drug use: No     PHQ 2020 2020 10/14/2020   PHQ-9 Total Score 15 - 18   Q9: Thoughts of better off dead/self-harm past 2 weeks Not at all Not at all Not at all     LEONOR-7 SCORE 2020 2020 10/14/2020   Total Score - - -   Total Score 12 11 14     Last PHQ-9 10/14/2020   1.  Little interest or pleasure in doing things 3   2.  Feeling down, depressed, or hopeless 2   3.  Trouble falling or staying asleep, or sleeping too much 3   4.  Feeling tired or having little energy 2   5.  Poor appetite or overeating 0   6.  Feeling bad about yourself 3   7.  Trouble concentrating 2   8.  Moving slowly or restless 3   Q9: Thoughts of better off dead/self-harm past 2 weeks 0   PHQ-9 Total Score 18   Difficulty at work, home, or with people Very difficult     LEONOR-7  10/14/2020   1. Feeling nervous, anxious, or on edge 3   2. Not being able to stop or control worrying 2   3. Worrying too much about different things 2   4. Trouble relaxing 3   5. Being so restless that it is hard to sit still 1   6. Becoming easily annoyed or irritable 1   7. Feeling afraid, as if something awful might happen 2   LEONOR-7 Total Score 14   If you checked any problems, how difficult have they made it for you to do your work, take care of things at home, or get along with other people? Very difficult       Suicide Assessment Five-step Evaluation and Treatment (SAFE-T)      How many servings of fruits and vegetables do you eat daily?  2-3    On average, how many sweetened beverages do you drink each day (Examples: soda, juice, sweet tea, etc.  Do NOT count diet or artificially sweetened beverages)?   0    How many days per week do you exercise enough to make your heart beat faster? 4    How many minutes a day do you exercise enough to make your heart beat faster? 20 - 29    How many days per week do you  miss taking your medication? 0         Patient is not feeling any better and now more anxious. Poor sleep    She has a hx of not tolerating medications. She was seen previously by Dr Gonzalez and psychiatry. Review of charts shows concerns for side effects to selective serotonin reuptake inhibitor, wellbutrin and Buspar.     She may have not tolerated Effexor but we restarted at a low dose.    After review our plan will be to refer her again to psychiatry, try hydroxyzine for her anxiety.     We could consider TMS. She is going to read more about it and may consider.     She will need to establish with a new provider and I recommended Dr Torres. I will review with her.     Review of Systems   Constitutional, HEENT, cardiovascular, pulmonary, gi and gu systems are negative, except as otherwise noted.       Objective          Vitals:  No vitals were obtained today due to virtual visit.    healthy, alert and no distress  PSYCH: Alert and oriented times 3; coherent speech, normal   rate and volume, able to articulate logical thoughts, able   to abstract reason, no tangential thoughts, no hallucinations   or delusions  Her affect is anxious  RESP: No cough, no audible wheezing, able to talk in full sentences  Remainder of exam unable to be completed due to telephone visits            Assessment/Plan:    Assessment & Plan     Lyn was seen today for depression and anxiety.    Diagnoses and all orders for this visit:    Major depressive disorder, recurrent episode, moderate (H)  -     MENTAL HEALTH REFERRAL  - Adult; Psychiatry; Psychiatry; [facility]: Collaborative Care Psychiatry Service/Bridge to Long-Term Psychiatry as indicated ([phone]); Yes; Several Medications tried and failed; Yes; We will contact you to schedule the a...    Anxiety  -     hydrOXYzine (VISTARIL) 50 MG capsule; Take 1 capsule (50 mg) by mouth 3 times daily as needed for itching  -     MENTAL HEALTH REFERRAL  - Adult; Psychiatry; Psychiatry; [facility]:  Collaborative Care Psychiatry Service/Bridge to Long-Term Psychiatry as indicated ([phone]); Yes; Several Medications tried and failed; Yes; We will contact you to schedule the a...            Patient Instructions     Dr Corbett will review with Dr Torres transition of care    Orders Placed This Encounter     MENTAL HEALTH REFERRAL  - Adult; Psychiatry; Psychiatry; [facility]: Collaborative Care Psychiatry Service/Bridge to Long-Term Psychiatry as indicated ([phone]); Yes; Several Medications tried and failed; Yes; We will contact you to schedule the a...     Referral Priority:   Routine     Referral Type:   Mental Health Outpatient     Number of Visits Requested:   1     hydrOXYzine (VISTARIL) 50 MG capsule     Sig: Take 1 capsule (50 mg) by mouth 3 times daily as needed for itching     Dispense:  90 capsule     Refill:  3           Return in about 1 month (around 11/14/2020) for Review response to medications and establish with Dr Torres.    Ez Corbett MD, MD  [facility]      15  minutes with chart review, lab review and discussion with patient

## 2020-10-14 NOTE — PATIENT INSTRUCTIONS
Dr Corbett will review with Dr Torres transition of care    Orders Placed This Encounter     MENTAL HEALTH REFERRAL  - Adult; Psychiatry; Psychiatry; [facility]: Collaborative Care Psychiatry Service/Bridge to Long-Term Psychiatry as indicated ([phone]); Yes; Several Medications tried and failed; Yes; We will contact you to schedule the a...     Referral Priority:   Routine     Referral Type:   Mental Health Outpatient     Number of Visits Requested:   1     hydrOXYzine (VISTARIL) 50 MG capsule     Sig: Take 1 capsule (50 mg) by mouth 3 times daily as needed for itching     Dispense:  90 capsule     Refill:  3

## 2020-10-15 ASSESSMENT — ANXIETY QUESTIONNAIRES: GAD7 TOTAL SCORE: 14

## 2020-11-14 ENCOUNTER — HEALTH MAINTENANCE LETTER (OUTPATIENT)
Age: 60
End: 2020-11-14

## 2021-04-16 ENCOUNTER — MYC MEDICAL ADVICE (OUTPATIENT)
Dept: FAMILY MEDICINE | Facility: CLINIC | Age: 61
End: 2021-04-16

## 2021-05-23 ENCOUNTER — HEALTH MAINTENANCE LETTER (OUTPATIENT)
Age: 61
End: 2021-05-23

## 2021-07-28 ENCOUNTER — OFFICE VISIT (OUTPATIENT)
Dept: OPHTHALMOLOGY | Facility: CLINIC | Age: 61
End: 2021-07-28
Attending: OPHTHALMOLOGY
Payer: COMMERCIAL

## 2021-07-28 DIAGNOSIS — H43.813 POSTERIOR VITREOUS DETACHMENT, BILATERAL: ICD-10-CM

## 2021-07-28 DIAGNOSIS — H52.13 MYOPIC ASTIGMATISM OF BOTH EYES: ICD-10-CM

## 2021-07-28 DIAGNOSIS — H52.4 PRESBYOPIA: ICD-10-CM

## 2021-07-28 DIAGNOSIS — H52.203 MYOPIC ASTIGMATISM OF BOTH EYES: ICD-10-CM

## 2021-07-28 DIAGNOSIS — H25.813 COMBINED FORM OF AGE-RELATED CATARACT, BOTH EYES: Primary | ICD-10-CM

## 2021-07-28 PROCEDURE — G0463 HOSPITAL OUTPT CLINIC VISIT: HCPCS

## 2021-07-28 PROCEDURE — 92004 COMPRE OPH EXAM NEW PT 1/>: CPT | Performed by: OPHTHALMOLOGY

## 2021-07-28 RX ORDER — MIRTAZAPINE 15 MG/1
15 TABLET, FILM COATED ORAL AT BEDTIME
COMMUNITY
Start: 2021-07-06 | End: 2022-01-31

## 2021-07-28 ASSESSMENT — SLIT LAMP EXAM - LIDS
COMMENTS: NORMAL
COMMENTS: NORMAL

## 2021-07-28 ASSESSMENT — REFRACTION_WEARINGRX
OD_SPHERE: -1.50
OS_AXIS: 032
OD_CYLINDER: +1.00
OS_SPHERE: -2.75
OD_ADD: +2.50
OS_ADD: +2.50
OS_CYLINDER: +0.75
OD_AXIS: 152

## 2021-07-28 ASSESSMENT — REFRACTION_MANIFEST
OD_AXIS: 152
OD_CYLINDER: +0.50
OS_AXIS: 015
OS_ADD: +2.50
OS_CYLINDER: +0.75
OS_SPHERE: -2.00
OD_SPHERE: -0.75
OD_ADD: +2.50

## 2021-07-28 ASSESSMENT — VISUAL ACUITY
CORRECTION_TYPE: GLASSES
OS_CC+: -2
OD_CC: 20/20
OD_CC+: -2
METHOD: SNELLEN - LINEAR
METHOD_MR_RETINOSCOPY: 1
OS_CC: 20/20

## 2021-07-28 ASSESSMENT — TONOMETRY
IOP_METHOD: TONOPEN
OD_IOP_MMHG: 15
OS_IOP_MMHG: 16

## 2021-07-28 ASSESSMENT — CONF VISUAL FIELD
OD_NORMAL: 1
OS_NORMAL: 1
METHOD: COUNTING FINGERS

## 2021-07-28 ASSESSMENT — CUP TO DISC RATIO
OD_RATIO: 0.25
OS_RATIO: 0.3

## 2021-07-28 ASSESSMENT — EXTERNAL EXAM - LEFT EYE: OS_EXAM: NORMAL

## 2021-07-28 ASSESSMENT — PATIENT HEALTH QUESTIONNAIRE - PHQ9: SUM OF ALL RESPONSES TO PHQ QUESTIONS 1-9: 9

## 2021-07-28 ASSESSMENT — EXTERNAL EXAM - RIGHT EYE: OD_EXAM: NORMAL

## 2021-07-28 NOTE — NURSING NOTE
Chief Complaints and History of Present Illnesses   Patient presents with     Annual Eye Exam     Chief Complaint(s) and History of Present Illness(es)     Annual Eye Exam     Laterality: both eyes    Associated symptoms: floaters.  Negative for flashes, eye pain and tearing    Treatments tried: no treatments              Comments     Pt C/o some trouble with distance vision X 3 months  Pt states floaters both eyes, not new.  No flashes, eye pain or tearing BE    Denise High COT 8:17 AM July 28, 2021

## 2021-07-28 NOTE — PROGRESS NOTES
HPI:  Lyn Santana is a 61 year old female here for evaluation of blurred vision in both eyes, more noticeable at distance than at near, present for the last few months. Her current glasses are a few years old. No eye pain, redness, discharge. No flashes/floaters.    POH: No history of eye surgery or trauma  PMH: No DM or HTN  FH: No FH of AMD or glc    Assessment & Plan     (H25.813) Combined form of age-related cataract, both eyes  (primary encounter diagnosis)  Comment: Mild, not visually significant  Plan: Observe    (H43.813) Posterior vitreous detachment, bilateral  Comment: Lilly ring both eyes. Peripheral retina flat.  Plan: Discussed signs/sx of RT/RD and the patient knows to call immediately if they develop these symptoms.     (H52.203,  H52.13) Myopic astigmatism of both eyes  (H52.4) Presbyopia  Comment: Improved vision with refraction  Plan: Given updated glasses Rx.      -----------------------------------------------------------------------------------    Patient disposition:   Return in about 1 year (around 7/28/2022) for dilated eye exam, or sooner as needed.    Teaching statement:  Complete documentation of historical and exam elements from today's encounter can be found in the full encounter summary report (not reduplicated in this progress note). I personally obtained the chief complaint(s) and history of present illness.  I confirmed and edited as necessary the review of systems, past medical/surgical history, family history, social history, and examination findings as documented by others; and I examined the patient myself. I personally reviewed the relevant tests, images, and reports as documented above.     I formulated and edited as necessary the assessment and plan and discussed the findings and management plan with the patient and family.      Lexus Pandey MD  Comprehensive Ophthalmology & Ocular Pathology  Department of Ophthalmology and Visual  [email or web address]  Pager [phone]

## 2021-09-12 ENCOUNTER — HEALTH MAINTENANCE LETTER (OUTPATIENT)
Age: 61
End: 2021-09-12

## 2021-09-16 ENCOUNTER — PATIENT OUTREACH (OUTPATIENT)
Dept: FAMILY MEDICINE | Facility: CLINIC | Age: 61
End: 2021-09-16

## 2021-09-16 NOTE — TELEPHONE ENCOUNTER
Patient Quality Outreach      Summary:    Patient has the following on her problem list/HM:     Depression / Dysthymia review    6 Month Remission: 4-8 month window range: N/A  12 Month Remission: 10-14 month window range: N/A       PHQ-9 SCORE 9/2/2020 10/14/2020 7/28/2021   PHQ-9 Total Score - - -   PHQ-9 Total Score MyChart - - -   PHQ-9 Total Score 15 18 9   Some encounter information is confidential and restricted. Go to Review Flowsheets activity to see all data.       If PHQ-9 recheck is 5 or more, route to provider for next steps.    Patient is due/failing the following:   Colonoscopy, Breast Cancer Screening - Mammogram, Depression follow-up visit and Adult/Adolescent physical, date due: past due    Type of outreach:    Sent [employer] message.    Questions for provider review:    None                                                                                                                                     Christine Fox MA       Chart routed to Care Team.

## 2021-09-16 NOTE — LETTER
September 23, 2021      Lyn Santana  [address] [place] [address]      Your healthcare team cares about your health. To provide you with the best care,   we have reviewed your chart and based on our findings, we see that you are due to:     - FOLLOW UP: Schedule an OFFICE VISIT for a Mental Health Follow-up to help us address your specific concerns and determine the best health recommendations for you.    - BREAST CANCER SCREENING:  Schedule Annual Mammogram. [facility] scheduling number - [phone] or schedule in MyChart (self referall)  - CERVICAL CANCER SCREENING: Schedule a Cervical Cancer Screening, with Pap and wellness exam.   - COLON CANCER SCREENING:  Call or mychart the clinic to schedule your colonoscopy or schedule/ your FIT Test, or Cologuard test  - OTHER FOLLOW UP:  Annual phyiscal    If you have already completed these items, please contact the clinic via phone or   [employer] so your care team can review and update your records. Thank you for   choosing [facility] for your healthcare needs. For any questions,   concerns, or to schedule an appointment please contact the clinic.       Healthy Regards,      Your [facility] Care Team

## 2021-09-23 NOTE — TELEPHONE ENCOUNTER
Patient Quality Outreach 2nd Attempt      Summary:    Type of outreach:    Sent letter.    Next Steps:  Reach out within 90 days via [employer].    Max number of attempts reached: Yes. Will try again in 90 days if patient still on fail list.    Questions for provider review:    None                                                                                                                    Christine Fox MA       Chart routed to Care Team.

## 2021-12-22 ENCOUNTER — IMMUNIZATION (OUTPATIENT)
Dept: NURSING | Facility: CLINIC | Age: 61
End: 2021-12-22
Payer: COMMERCIAL

## 2021-12-22 PROCEDURE — 91306 COVID-19,PF,MODERNA (18+ YRS BOOSTER .25ML): CPT

## 2021-12-22 PROCEDURE — 0064A COVID-19,PF,MODERNA (18+ YRS BOOSTER .25ML): CPT

## 2021-12-22 PROCEDURE — 90682 RIV4 VACC RECOMBINANT DNA IM: CPT

## 2021-12-22 PROCEDURE — 90471 IMMUNIZATION ADMIN: CPT

## 2022-01-02 ENCOUNTER — HEALTH MAINTENANCE LETTER (OUTPATIENT)
Age: 62
End: 2022-01-02

## 2022-01-03 ENCOUNTER — OFFICE VISIT (OUTPATIENT)
Dept: FAMILY MEDICINE | Facility: CLINIC | Age: 62
End: 2022-01-03
Payer: COMMERCIAL

## 2022-01-03 VITALS
BODY MASS INDEX: 24.54 KG/M2 | SYSTOLIC BLOOD PRESSURE: 118 MMHG | DIASTOLIC BLOOD PRESSURE: 76 MMHG | HEART RATE: 73 BPM | TEMPERATURE: 98 F | OXYGEN SATURATION: 97 % | WEIGHT: 125 LBS | HEIGHT: 60 IN

## 2022-01-03 DIAGNOSIS — F41.9 ANXIETY: ICD-10-CM

## 2022-01-03 DIAGNOSIS — F33.1 MAJOR DEPRESSIVE DISORDER, RECURRENT EPISODE, MODERATE (H): Primary | ICD-10-CM

## 2022-01-03 PROCEDURE — 99214 OFFICE O/P EST MOD 30 MIN: CPT | Performed by: FAMILY MEDICINE

## 2022-01-03 RX ORDER — GABAPENTIN 300 MG/1
CAPSULE ORAL
COMMUNITY
Start: 2021-11-10 | End: 2022-01-04

## 2022-01-03 RX ORDER — BUSPIRONE HYDROCHLORIDE 5 MG/1
5-10 TABLET ORAL 2 TIMES DAILY PRN
Qty: 20 TABLET | Refills: 1 | Status: SHIPPED | OUTPATIENT
Start: 2022-01-03 | End: 2022-01-31

## 2022-01-03 RX ORDER — FLUOXETINE 40 MG/1
CAPSULE ORAL
COMMUNITY
Start: 2021-12-09 | End: 2022-01-31

## 2022-01-03 ASSESSMENT — MIFFLIN-ST. JEOR: SCORE: 1047.25

## 2022-01-03 ASSESSMENT — PATIENT HEALTH QUESTIONNAIRE - PHQ9
SUM OF ALL RESPONSES TO PHQ QUESTIONS 1-9: 6
10. IF YOU CHECKED OFF ANY PROBLEMS, HOW DIFFICULT HAVE THESE PROBLEMS MADE IT FOR YOU TO DO YOUR WORK, TAKE CARE OF THINGS AT HOME, OR GET ALONG WITH OTHER PEOPLE: SOMEWHAT DIFFICULT
SUM OF ALL RESPONSES TO PHQ QUESTIONS 1-9: 6

## 2022-01-03 ASSESSMENT — ANXIETY QUESTIONNAIRES
GAD7 TOTAL SCORE: 4
6. BECOMING EASILY ANNOYED OR IRRITABLE: NOT AT ALL
7. FEELING AFRAID AS IF SOMETHING AWFUL MIGHT HAPPEN: NOT AT ALL
5. BEING SO RESTLESS THAT IT IS HARD TO SIT STILL: NOT AT ALL
1. FEELING NERVOUS, ANXIOUS, OR ON EDGE: SEVERAL DAYS
2. NOT BEING ABLE TO STOP OR CONTROL WORRYING: SEVERAL DAYS
GAD7 TOTAL SCORE: 4
4. TROUBLE RELAXING: SEVERAL DAYS
7. FEELING AFRAID AS IF SOMETHING AWFUL MIGHT HAPPEN: NOT AT ALL
3. WORRYING TOO MUCH ABOUT DIFFERENT THINGS: SEVERAL DAYS
GAD7 TOTAL SCORE: 4

## 2022-01-03 NOTE — PROGRESS NOTES
Assessment & Plan     Major depressive disorder, recurrent episode, moderate (H)  Continue Prozac 40 mg daily and Remeron 15 mg before bed.  She does not need refills on these.  She received these through her nurse practitioner Fran    Anxiety  We will add BuSpar to her Prozac and Remeron to help with anxiety  - busPIRone (BUSPAR) 5 MG tablet; Take 1-2 tablets (5-10 mg) by mouth 2 times daily as needed (anxiety)      30 minutes spent on the date of the encounter doing chart review, history and exam, documentation and further activities per the note        Return in about 4 weeks (around 2022) for mood recheck, telehealth.    Nona Torres [facility]    Dawit Nicholson is a 61 year old who presents for the following health issues  accompanied by her None.    History of Present Illness       Mental Health Follow-up:  Patient presents to follow-up on Depression & Anxiety.Patient's depression since last visit has been:  Medium  The patient is not having other symptoms associated with depression.  Patient's anxiety since last visit has been:  Better  The patient is not having other symptoms associated with anxiety.  Any significant life events: job concerns  Patient is not feeling anxious or having panic attacks.  Patient has no concerns about alcohol or drug use.     Social History  Tobacco Use    Smoking status: Former Smoker      Packs/day: 2.00      Years: 12.00      Pack years: 24      Types: Cigarettes      Start date: 1971      Quit date: 1984      Years since quittin.5    Smokeless tobacco: Never Used  Alcohol use: Yes    Comment: 4-6 per week  Drug use: No      Today's PHQ-9         PHQ-9 Total Score:     (P) 6   PHQ-9 Q9 Thoughts of better off dead/self-harm past 2 weeks :   (P) Not at all   Thoughts of suicide or self harm:      Self-harm Plan:        Self-harm Action:          Safety concerns for self or others:           She eats 2-3 servings of  "fruits and vegetables daily.She consumes 0 sweetened beverage(s) daily.She exercises with enough effort to increase her heart rate 20 to 29 minutes per day.  She exercises with enough effort to increase her heart rate 4 days per week.   She is taking medications regularly.       Has been seeing a NP with [facility]. Last virtual visit with them was a month ago.  She had the fluoxetine increase from 20 mg to 40 mg daily.      Discuss Fluoxetine: increase of weight,but also due to other factors.     Has recently increase her Fluoxetine to 40 mg in the past month.   Has feeling of social anxiety.    Was also given Gabapentin but did not take due to reading all the side effects.  She was given this for anxiety.        Review of Systems   Constitutional, HEENT, cardiovascular, pulmonary, gi and gu systems are negative, except as otherwise noted.      Objective    /76 (BP Location: Right arm, Patient Position: Sitting, Cuff Size: Adult Regular)   Pulse 73   Temp 98  F (36.7  C) (Oral)   Ht 1.53 m (5' 0.24\")   Wt 56.7 kg (125 lb)   LMP  (LMP Unknown)   SpO2 97%   BMI 24.22 kg/m    Body mass index is 24.22 kg/m .  Physical Exam   GENERAL: healthy, alert and no distress  NECK: no adenopathy, no asymmetry, masses, or scars and thyroid normal to palpation  RESP: lungs clear to auscultation - no rales, rhonchi or wheezes  CV: regular rate and rhythm, normal S1 S2, no S3 or S4, no murmur, click or rub, no peripheral edema and peripheral pulses strong  PSYCH: mentation appears normal, affect normal/bright          Answers for HPI/ROS submitted by the patient on 1/3/2022  If you checked off any problems, how difficult have these problems made it for you to do your work, take care of things at home, or get along with other people?: Somewhat difficult  PHQ9 TOTAL SCORE: 6  LEONOR 7 TOTAL SCORE: 4      "

## 2022-01-04 ASSESSMENT — PATIENT HEALTH QUESTIONNAIRE - PHQ9: SUM OF ALL RESPONSES TO PHQ QUESTIONS 1-9: 6

## 2022-01-04 ASSESSMENT — ANXIETY QUESTIONNAIRES: GAD7 TOTAL SCORE: 4

## 2022-01-09 NOTE — TELEPHONE ENCOUNTER
Called patient and let her know that letter will be at  for her to .    Walked letter to  and logged into book.    Susana Valencia       Notified Dr Dalton with [facility] of SBP trending up to 160-170's.  Hydralazine 10mg IVP PRN ordered.  Team to evaluate home medications this am.

## 2022-01-30 ASSESSMENT — ANXIETY QUESTIONNAIRES
3. WORRYING TOO MUCH ABOUT DIFFERENT THINGS: NOT AT ALL
GAD7 TOTAL SCORE: 1
2. NOT BEING ABLE TO STOP OR CONTROL WORRYING: NOT AT ALL
4. TROUBLE RELAXING: NOT AT ALL
7. FEELING AFRAID AS IF SOMETHING AWFUL MIGHT HAPPEN: NOT AT ALL
5. BEING SO RESTLESS THAT IT IS HARD TO SIT STILL: NOT AT ALL
GAD7 TOTAL SCORE: 1
6. BECOMING EASILY ANNOYED OR IRRITABLE: NOT AT ALL
7. FEELING AFRAID AS IF SOMETHING AWFUL MIGHT HAPPEN: NOT AT ALL
GAD7 TOTAL SCORE: 1
1. FEELING NERVOUS, ANXIOUS, OR ON EDGE: SEVERAL DAYS

## 2022-01-30 ASSESSMENT — PATIENT HEALTH QUESTIONNAIRE - PHQ9
SUM OF ALL RESPONSES TO PHQ QUESTIONS 1-9: 1
SUM OF ALL RESPONSES TO PHQ QUESTIONS 1-9: 1
10. IF YOU CHECKED OFF ANY PROBLEMS, HOW DIFFICULT HAVE THESE PROBLEMS MADE IT FOR YOU TO DO YOUR WORK, TAKE CARE OF THINGS AT HOME, OR GET ALONG WITH OTHER PEOPLE: NOT DIFFICULT AT ALL

## 2022-01-31 ENCOUNTER — VIRTUAL VISIT (OUTPATIENT)
Dept: FAMILY MEDICINE | Facility: CLINIC | Age: 62
End: 2022-01-31
Payer: COMMERCIAL

## 2022-01-31 DIAGNOSIS — Z12.11 SCREEN FOR COLON CANCER: ICD-10-CM

## 2022-01-31 DIAGNOSIS — G47.00 INSOMNIA, UNSPECIFIED TYPE: ICD-10-CM

## 2022-01-31 DIAGNOSIS — Z12.31 VISIT FOR SCREENING MAMMOGRAM: ICD-10-CM

## 2022-01-31 DIAGNOSIS — F41.9 ANXIETY: Primary | ICD-10-CM

## 2022-01-31 PROCEDURE — 99213 OFFICE O/P EST LOW 20 MIN: CPT | Mod: 95 | Performed by: FAMILY MEDICINE

## 2022-01-31 RX ORDER — MIRTAZAPINE 15 MG/1
15 TABLET, FILM COATED ORAL AT BEDTIME
Qty: 90 TABLET | Refills: 1 | Status: SHIPPED | OUTPATIENT
Start: 2022-01-31 | End: 2022-07-25

## 2022-01-31 RX ORDER — FLUOXETINE 40 MG/1
40 CAPSULE ORAL DAILY
Qty: 90 CAPSULE | Refills: 1 | Status: SHIPPED | OUTPATIENT
Start: 2022-01-31 | End: 2022-07-25

## 2022-01-31 RX ORDER — BUSPIRONE HYDROCHLORIDE 5 MG/1
TABLET ORAL
Qty: 90 TABLET | Refills: 4 | Status: SHIPPED | OUTPATIENT
Start: 2022-01-31 | End: 2022-08-19

## 2022-01-31 ASSESSMENT — PATIENT HEALTH QUESTIONNAIRE - PHQ9: SUM OF ALL RESPONSES TO PHQ QUESTIONS 1-9: 1

## 2022-01-31 ASSESSMENT — ANXIETY QUESTIONNAIRES: GAD7 TOTAL SCORE: 1

## 2022-01-31 NOTE — PROGRESS NOTES
Lyn is a 61 year old who is being evaluated via a billable video visit.      How would you like to obtain your AVS? MyChart  If the video visit is dropped, the invitation should be resent by: Text to cell phone: [phone]  Will anyone else be joining your video visit? No    Video Start Time: 200 pm    Assessment & Plan     Anxiety  The patient is doing well with addition of BuSpar.  We will have her continue BuSpar 10 mg in the morning and trial 5 mg in the afternoon  - FLUoxetine (PROZAC) 40 MG capsule; Take 1 capsule (40 mg) by mouth daily Do not fill until contacted by patient  - busPIRone (BUSPAR) 5 MG tablet; 10 mg po in am and 5 mg po in pm    Insomnia, unspecified type  The current medical regimen is effective;  continue present plan and medications.    - mirtazapine (REMERON) 15 MG tablet; Take 1 tablet (15 mg) by mouth At Bedtime Do not fill until contacted by patient    Screen for colon cancer    - Adult Gastro Ref - Procedure Only; Future    Visit for screening mammogram    - MA Screen Bilateral w/Duane; Future      20 minutes spent on the date of the encounter doing chart review, history and exam, documentation and further activities per the note       See Patient Instructions    Return in about 6 months (around 7/31/2022) for mood recheck.    Nona Torres DO  [facility]    Dawit Nicholson is a 61 year old who presents for the following health issues     HPI     Depression and Anxiety Follow-Up    How are you doing with your depression since your last visit? Improved     How are you doing with your anxiety since your last visit?  Improved     Are you having other symptoms that might be associated with depression or anxiety? No    Have you had a significant life event? No     Do you have any concerns with your use of alcohol or other drugs? No     BuSpar was started excellently 1 month ago in addition to her Prozac 40 mg and Remeron 15 mg at at bedtime    Social  History     Tobacco Use     Smoking status: Former Smoker     Packs/day: 2.00     Years: 12.00     Pack years: 24.00     Types: Cigarettes     Start date: 1971     Quit date: 1984     Years since quittin.6     Smokeless tobacco: Never Used   Vaping Use     Vaping Use: Never used   Substance Use Topics     Alcohol use: Yes     Comment: 3-5 per week     Drug use: No     PHQ 2021 1/3/2022 2022   PHQ-9 Total Score 9 6 1   Q9: Thoughts of better off dead/self-harm past 2 weeks Not at all Not at all Not at all     LEONOR-7 SCORE 10/14/2020 1/3/2022 2022   Total Score - - -   Total Score - 4 (minimal anxiety) 1 (minimal anxiety)   Total Score 14 4 1     Last PHQ-9 2022   1.  Little interest or pleasure in doing things 0   2.  Feeling down, depressed, or hopeless 0   3.  Trouble falling or staying asleep, or sleeping too much 0   4.  Feeling tired or having little energy 0   5.  Poor appetite or overeating 0   6.  Feeling bad about yourself 0   7.  Trouble concentrating 0   8.  Moving slowly or restless 1   Q9: Thoughts of better off dead/self-harm past 2 weeks 0   PHQ-9 Total Score 1   Difficulty at work, home, or with people -     LEONOR-7  2022   1. Feeling nervous, anxious, or on edge 1   2. Not being able to stop or control worrying 0   3. Worrying too much about different things 0   4. Trouble relaxing 0   5. Being so restless that it is hard to sit still 0   6. Becoming easily annoyed or irritable 0   7. Feeling afraid, as if something awful might happen 0   LEONOR-7 Total Score 1   If you checked any problems, how difficult have they made it for you to do your work, take care of things at home, or get along with other people? -       Suicide Assessment Five-step Evaluation and Treatment (SAFE-T)          Review of Systems   Constitutional, HEENT, cardiovascular, pulmonary, gi and gu systems are negative, except as otherwise noted.      Objective           Vitals:  No vitals were  obtained today due to virtual visit.    Physical Exam   GENERAL: Healthy, alert and no distress  EYES: Eyes grossly normal to inspection.  No discharge or erythema, or obvious scleral/conjunctival abnormalities.  RESP: No audible wheeze, cough, or visible cyanosis.  No visible retractions or increased work of breathing.    SKIN: Visible skin clear. No significant rash, abnormal pigmentation or lesions.  NEURO: Cranial nerves grossly intact.  Mentation and speech appropriate for age.  PSYCH: Mentation appears normal, affect normal/bright, judgement and insight intact, normal speech and appearance well-groomed.            Video-Visit Details    Type of service:  Video Visit    Video End Time:2:20 PM    Originating Location (pt. Location): Home    Distant Location (provider location):  [facility]     Platform used for Video Visit: [employer]

## 2022-02-07 ENCOUNTER — TELEPHONE (OUTPATIENT)
Dept: GASTROENTEROLOGY | Facility: CLINIC | Age: 62
End: 2022-02-07
Payer: COMMERCIAL

## 2022-02-07 DIAGNOSIS — Z11.59 ENCOUNTER FOR SCREENING FOR OTHER VIRAL DISEASES: Primary | ICD-10-CM

## 2022-02-07 NOTE — TELEPHONE ENCOUNTER
Screening Questions  Blue=prep questions Red=location Green=sedation   1. Are you active on mychart? Y    2. What insurance is in the chart? [employer]     3.  Ordering/Referring Provider: Brian    4. BMI 24.8, If greater than 40 review exclusion criteria also will need EXTENDED PREP    5.  Respiratory Screening (If yes to any of the following HOSPITAL setting only):     Do you use daily home oxygen? N  Do you have mod to severe Obstructive Sleep Apnea? N (can be seen at [facility] or hospital setting)    Do you have Pulmonary Hypertension? N   Do you have UNCONTROLLED asthma? N    6. Have you had a heart or lung transplant? N  (If yes, please review exclusion criteria)    7. Are you currently on dialysis?N  (If yes, schedule in HOSPITAL setting only)(If yes, please send Golytely prep)    8. Do you have chronic kidney disease? N (If yes, please send Golytely prep)    9. Have you had a stroke or Transient ischemic attack (TIA) within 6 months? N (If yes, do not schedule at [facility])    10. In the past 6 months, have you had any heart related issues including cardiomyopathy or heart attack? N (If yes, please review exclusion criteria)           If yes, did it require cardiac stenting or other implantable device?N  (If yes, please review exclusion criteria)      11. Do you have any implantable devices in your body (pacemaker, defib, LVAD)? N (If yes, schedule at [facility])    12. Do you take nitroglycerin? If yes, how often? N (if yes, schedule at HOSPITAL setting)    13. Are you currently taking any blood thinners?N (If yes- inform patient to follow up with PCP or provider for follow up instructions)     14. Are you a diabetic? N (If yes, please send Golytely prep)    15. (Females) Are you currently pregnant? NA  If yes, how many weeks?      16. Are you taking any prescription pain medications on a routine schedule? N If yes, MAC sedation and patient will need EXTENDED PREP.    17. Do you have any chemical dependencies such as alcohol,  street drugs, or methadone? N If yes, MAC sedation     18. Do you have any history of post-traumatic stress syndrome, severe anxiety or history of psychosis? Yes-depression and anxiety  If yes, MAC sedation.     19. Do you transfer independently? Yes    20.  Do you have any issues with constipation? Yes   If yes, pt will need EXTENDED PREP     21. Preferred Pharmacy for Pre Prescription [id number] - [place], [place] - [address] AT [facility] [address]    Scheduling Details    Which Colonoscopy Prep was Sent?: Extended  Type of Procedure Scheduled: Colon  Surgeon: Daniel  Date of Procedure: 2/28/22  Location: [facility]  Caller (Please ask for phone number if not scheduled by patient): Lyn      Sedation Type: MAC  Conscious Sedation- Needs  for 6 hours after the procedure  MAC/General-Needs  for 24 hours after procedure    Pre-op Required at [facility] [place], [place] and OR for MAC sedation: NA  (if yes advise patient they will need a pre-op prior to procedure)      Informed patient they will need an adult  Yes  Cannot take any type of public or medical transportation alone    Pre-Procedure Covid test to be completed at [facility] or Externally: [place] 2/24/22    Confirmed Nurse will call to complete assessment Yes    Additional comments:  (DE [facility] PATIENTS NEED EXTENDED PREP)

## 2022-02-21 ENCOUNTER — TELEPHONE (OUTPATIENT)
Dept: GASTROENTEROLOGY | Facility: CLINIC | Age: 62
End: 2022-02-21
Payer: COMMERCIAL

## 2022-02-21 DIAGNOSIS — K59.00 CONSTIPATION, UNSPECIFIED CONSTIPATION TYPE: Primary | ICD-10-CM

## 2022-02-21 RX ORDER — BISACODYL 5 MG
TABLET, DELAYED RELEASE (ENTERIC COATED) ORAL
Qty: 2 TABLET | Refills: 0 | Status: SHIPPED | OUTPATIENT
Start: 2022-02-21 | End: 2022-06-14

## 2022-02-21 NOTE — TELEPHONE ENCOUNTER
Attempted to contact patient regarding upcoming colonoscopy procedure on 2.28.2022 for pre assessment questions. No answer.     Left message to return call to [phone] #2    Covid test scheduled: 2.24.2022    Arrival time: 0845    Facility location: [facility]    Sedation type: MAC    Indication for procedure: screening colonoscopy    Referring provider: Nona Torres DO    Bowel prep recommendation: extended prep d/t constipation    Prep sent to [id number] - [place], [place] - [address] AT [facility] DAPHNE Veras RN

## 2022-02-22 ENCOUNTER — ANCILLARY PROCEDURE (OUTPATIENT)
Dept: MAMMOGRAPHY | Facility: CLINIC | Age: 62
End: 2022-02-22
Attending: FAMILY MEDICINE
Payer: COMMERCIAL

## 2022-02-22 DIAGNOSIS — Z12.31 VISIT FOR SCREENING MAMMOGRAM: ICD-10-CM

## 2022-02-22 PROCEDURE — 77063 BREAST TOMOSYNTHESIS BI: CPT | Performed by: RADIOLOGY

## 2022-02-22 PROCEDURE — 77067 SCR MAMMO BI INCL CAD: CPT | Performed by: RADIOLOGY

## 2022-02-23 NOTE — TELEPHONE ENCOUNTER
Pre assessment questions completed for upcoming colonoscopy procedure scheduled on 2/28/22    COVID test scheduled 2/24/22    Reviewed procedural arrival time 0845 and facility location [facility].    Designated  policy reviewed. Instructed to have someone stay 24 hours post procedure.     Reviewed Extended prep instructions with patient. No fiber/iron supplements or foods that contain nuts/seeds.    Resent prep instructions via mychart.     Patient verbalized understanding and had no questions or concerns at this time.    Andria Kelly RN

## 2022-02-23 NOTE — TELEPHONE ENCOUNTER
Second attempt for pre-assessment prior to upcoming colonoscopy.    Pt given [facility] [phone] #2 to callback when able.    Dilcia Veras RN

## 2022-02-24 ENCOUNTER — LAB (OUTPATIENT)
Dept: LAB | Facility: CLINIC | Age: 62
End: 2022-02-24
Payer: COMMERCIAL

## 2022-02-24 DIAGNOSIS — Z11.59 ENCOUNTER FOR SCREENING FOR OTHER VIRAL DISEASES: ICD-10-CM

## 2022-02-24 PROCEDURE — U0003 INFECTIOUS AGENT DETECTION BY NUCLEIC ACID (DNA OR RNA); SEVERE ACUTE RESPIRATORY SYNDROME CORONAVIRUS 2 (SARS-COV-2) (CORONAVIRUS DISEASE [COVID-19]), AMPLIFIED PROBE TECHNIQUE, MAKING USE OF HIGH THROUGHPUT TECHNOLOGIES AS DESCRIBED BY CMS-2020-01-R: HCPCS

## 2022-02-24 PROCEDURE — U0005 INFEC AGEN DETEC AMPLI PROBE: HCPCS

## 2022-02-25 LAB — SARS-COV-2 RNA RESP QL NAA+PROBE: NEGATIVE

## 2022-02-28 ENCOUNTER — HOSPITAL ENCOUNTER (OUTPATIENT)
Facility: CLINIC | Age: 62
Discharge: HOME | End: 2022-02-28
Attending: COLON & RECTAL SURGERY | Admitting: COLON & RECTAL SURGERY
Payer: COMMERCIAL

## 2022-02-28 VITALS
WEIGHT: 132.28 LBS | TEMPERATURE: 97.8 F | BODY MASS INDEX: 24.34 KG/M2 | HEIGHT: 62 IN | OXYGEN SATURATION: 96 % | RESPIRATION RATE: 18 BRPM | HEART RATE: 73 BPM | DIASTOLIC BLOOD PRESSURE: 68 MMHG | SYSTOLIC BLOOD PRESSURE: 110 MMHG

## 2022-02-28 LAB — COLONOSCOPY: NORMAL

## 2022-02-28 PROCEDURE — 99153 MOD SED SAME PHYS/QHP EA: CPT | Performed by: COLON & RECTAL SURGERY

## 2022-02-28 PROCEDURE — 250N000011 HC RX IP 250 OP 636: Performed by: COLON & RECTAL SURGERY

## 2022-02-28 PROCEDURE — 88305 TISSUE EXAM BY PATHOLOGIST: CPT | Mod: 26 | Performed by: PATHOLOGY

## 2022-02-28 PROCEDURE — 45378 DIAGNOSTIC COLONOSCOPY: CPT | Performed by: COLON & RECTAL SURGERY

## 2022-02-28 PROCEDURE — 88305 TISSUE EXAM BY PATHOLOGIST: CPT | Mod: TC | Performed by: COLON & RECTAL SURGERY

## 2022-02-28 PROCEDURE — G0500 MOD SEDAT ENDO SERVICE >5YRS: HCPCS | Performed by: COLON & RECTAL SURGERY

## 2022-02-28 PROCEDURE — 45380 COLONOSCOPY AND BIOPSY: CPT | Mod: PT | Performed by: COLON & RECTAL SURGERY

## 2022-02-28 RX ORDER — FENTANYL CITRATE 50 UG/ML
INJECTION, SOLUTION INTRAMUSCULAR; INTRAVENOUS PRN
Status: COMPLETED | OUTPATIENT
Start: 2022-02-28 | End: 2022-02-28

## 2022-02-28 RX ADMIN — MIDAZOLAM 0.5 MG: 1 INJECTION INTRAMUSCULAR; INTRAVENOUS at 12:07

## 2022-02-28 RX ADMIN — FENTANYL CITRATE 50 MCG: 50 INJECTION, SOLUTION INTRAMUSCULAR; INTRAVENOUS at 12:10

## 2022-02-28 RX ADMIN — MIDAZOLAM 0.5 MG: 1 INJECTION INTRAMUSCULAR; INTRAVENOUS at 12:04

## 2022-02-28 RX ADMIN — FENTANYL CITRATE 50 MCG: 50 INJECTION, SOLUTION INTRAMUSCULAR; INTRAVENOUS at 12:07

## 2022-02-28 RX ADMIN — FENTANYL CITRATE 50 MCG: 50 INJECTION, SOLUTION INTRAMUSCULAR; INTRAVENOUS at 12:00

## 2022-02-28 RX ADMIN — MIDAZOLAM 1 MG: 1 INJECTION INTRAMUSCULAR; INTRAVENOUS at 12:00

## 2022-02-28 RX ADMIN — FENTANYL CITRATE 50 MCG: 50 INJECTION, SOLUTION INTRAMUSCULAR; INTRAVENOUS at 12:04

## 2022-02-28 NOTE — LETTER
March 16, 2022      Lyn SHIRLENE Max  [address]  [place] [address]        Dear MsJanae,      We are writing to inform you of your test results.    The pathology from your recent colonoscopy showed benign (non-cancerous) tissue. Hyperplastic polyps rarely become cancerous and you will not need more frequent screening.    Dr. Sanchez recommends that you undergo a repeat colonoscopy in 10 year(s) for surveillance. We will enter you into a recall system so you receive a reminder closer to the time that you are due for repeat examination.     Please remember that this recommendation is made with the understanding that you are not experiencing persistent changes in bowel function, bleeding per rectum, and/or significant abdominal pain. If you experience these symptoms, please contact your primary care provider for a further evaluation.     If you have any questions or concerns about the results of your colonoscopy or the appropriate follow-up, please contact the Clinic at the number listed above. Thank you.      Sincerely,      AARON Whaley Orders   Surgical Pathology Exam   Result Value Ref Range    Case Report       Surgical Pathology Report                         Case: [id number]                                  Authorizing Provider:  Paulo Sanchez MD         Collected:           02/28/2022 12:25 PM          Ordering Location:     [facility]          Received:            02/28/2022 12:37 PM                                 Endoscopy                                                                    Pathologist:           Lauren Penn MD                                                   Specimen:    Large Intestine, Colon, rectal polyp                                                       Final Diagnosis       A(1). Rectal polyp:  -Hyperplastic polyp        Clinical Information       Procedure:  COLONOSCOPY  COLONOSCOPY, WITH  "POLYPECTOMY AND BIOPSY  Pre-op Diagnosis: Screen for colon cancer [Z12.11]  Post-op Diagnosis: Z12.11 - Screen for colon cancer [ICD-10-CM]      Gross Description       A(1). Large Intestine, Colon, rectal polyp:  The specimen is received in formalin with proper patient identification, labeled \"rectal polyp\".  The specimen consists of a 0.3 cm in greatest dimension, irregular tan soft tissue which is entirely submitted in cassette A1.        Microscopic Description       Microscopic examination is performed.         Performing Labs       The technical component of this testing was completed at [facility]      Case Images         "

## 2022-02-28 NOTE — H&P
Lyn Santana  [id number]  female  61 year old      Reason for procedure/surgery: screening    Patient Active Problem List   Diagnosis     Anxiety     Major depressive disorder, recurrent episode, moderate (H)       Past Surgical History:    Past Surgical History:   Procedure Laterality Date     SURGICAL HISTORY OF -       cryotherapy for abnormal pap(many years ago)       Past Medical History:   Past Medical History:   Diagnosis Date     Depressive disorder Dec. 2013    Have been taking an antidepressant since 2015; OK now       Social History:   Social History     Tobacco Use     Smoking status: Former Smoker     Packs/day: 2.00     Years: 12.00     Pack years: 24.00     Types: Cigarettes     Start date: 1971     Quit date: 1984     Years since quittin.7     Smokeless tobacco: Never Used   Substance Use Topics     Alcohol use: Yes     Comment: 3-5 per week       Family History:   Family History   Problem Relation Age of Onset     Cancer Mother         blood cancer     Alcohol/Drug Mother         alcoholic     Substance Abuse Mother      Other Cancer Mother      Obesity Mother      C.A.D. Father      Hypertension Father      Psychotic Disorder Father         Bipolar     Bipolar Disorder Father      Depression Father      Obesity Father      Cancer Maternal Grandmother         brain cancer     Other Cancer Maternal Grandmother      Cancer Maternal Grandfather         mouth or throat cancer     Heart Disease Paternal Grandfather         Heart attack  from this in 60's     Hypertension Brother      Cardiovascular Brother      Depression Brother      Diabetes Brother      Alcohol/Drug Brother         drug abuse     Suicide Paternal Aunt      Depression Paternal Aunt      Depression Paternal Half-Sister      Depression Niece      Substance Abuse Paternal Half-Brother      Glaucoma No family hx of      Macular Degeneration No family hx of        Allergies: No Known Allergies    Active  "Medications:   No current outpatient medications on file.       Systemic Review:   CONSTITUTIONAL: NEGATIVE for fever, chills, change in weight  ENT/MOUTH: NEGATIVE for ear, mouth and throat problems  RESP: NEGATIVE for significant cough or SOB  CV: NEGATIVE for chest pain, palpitations or peripheral edema    Physical Examination:   Vital Signs: /80   Pulse 76   Temp 98.1  F (36.7  C) (Oral)   Resp 16   Ht 5' 1.5\"   Wt 132 lb 4.4 oz   LMP  (LMP Unknown)   SpO2 98%   BMI 24.59 kg/m    GENERAL: healthy, alert and no distress  NECK: no adenopathy, no asymmetry, masses, or scars  RESP: lungs clear to auscultation - no rales, rhonchi or wheezes  CV: regular rate and rhythm, normal S1 S2, no S3 or S4, no murmur, click or rub, no peripheral edema and peripheral pulses strong  ABDOMEN: soft, nontender, no hepatosplenomegaly, no masses and bowel sounds normal  MS: no gross musculoskeletal defects noted, no edema    ASA Classification: (I)  Normal healthy patient    Plan: Appropriate to proceed as scheduled.      Lydia Lira MD  2/28/2022    PCP:  Nona Torres  "

## 2022-02-28 NOTE — OR NURSING
Procedure:Colonoscopy with polypectomy  Sedation: Conscious sedation (200 mcg fentanyl, 2 mg versed)  O2: 2 LPM NC during procedure  Tolerated: VS stable during and post procedure. Not c/o abdominal or chest pain.  Report: Given to Gabriela HOYT  Pt to recovery area in stable condition, accompanied by RN    Aissatou Ayala RN

## 2022-03-01 LAB
PATH REPORT.COMMENTS IMP SPEC: NORMAL
PATH REPORT.COMMENTS IMP SPEC: NORMAL
PATH REPORT.FINAL DX SPEC: NORMAL
PATH REPORT.GROSS SPEC: NORMAL
PATH REPORT.MICROSCOPIC SPEC OTHER STN: NORMAL
PATH REPORT.RELEVANT HX SPEC: NORMAL
PHOTO IMAGE: NORMAL

## 2022-03-11 ENCOUNTER — MYC MEDICAL ADVICE (OUTPATIENT)
Dept: FAMILY MEDICINE | Facility: CLINIC | Age: 62
End: 2022-03-11
Payer: COMMERCIAL

## 2022-03-11 NOTE — TELEPHONE ENCOUNTER
RN replied to patient via [employer]. See message for details.     James Macias RN, BSN, PHN  [facility]: [place]

## 2022-03-15 ENCOUNTER — OFFICE VISIT (OUTPATIENT)
Dept: FAMILY MEDICINE | Facility: CLINIC | Age: 62
End: 2022-03-15
Payer: COMMERCIAL

## 2022-03-15 VITALS
BODY MASS INDEX: 25.84 KG/M2 | SYSTOLIC BLOOD PRESSURE: 123 MMHG | WEIGHT: 139 LBS | OXYGEN SATURATION: 97 % | TEMPERATURE: 97.3 F | DIASTOLIC BLOOD PRESSURE: 81 MMHG | HEART RATE: 91 BPM

## 2022-03-15 DIAGNOSIS — M54.9 UPPER BACK PAIN ON RIGHT SIDE: Primary | ICD-10-CM

## 2022-03-15 PROCEDURE — 99213 OFFICE O/P EST LOW 20 MIN: CPT | Performed by: FAMILY MEDICINE

## 2022-03-15 RX ORDER — CYCLOBENZAPRINE HCL 5 MG
5 TABLET ORAL 3 TIMES DAILY PRN
Qty: 15 TABLET | Refills: 0 | Status: SHIPPED | OUTPATIENT
Start: 2022-03-15 | End: 2022-06-14

## 2022-03-15 RX ORDER — ASPIRIN 325 MG/1
325 TABLET, FILM COATED ORAL PRN
COMMUNITY
End: 2022-06-14

## 2022-03-15 ASSESSMENT — ENCOUNTER SYMPTOMS
HEADACHES: 0
LEG PAIN: 0
FEVER: 0
PARESTHESIAS: 0
PERIANAL NUMBNESS: 0
ABDOMINAL PAIN: 0
WEAKNESS: 1
DYSURIA: 0
TINGLING: 0
BACK PAIN: 1
PARESIS: 0
BOWEL INCONTINENCE: 0
NUMBNESS: 0
WEIGHT LOSS: 0

## 2022-03-15 ASSESSMENT — ANXIETY QUESTIONNAIRES
7. FEELING AFRAID AS IF SOMETHING AWFUL MIGHT HAPPEN: NOT AT ALL
GAD7 TOTAL SCORE: 3
4. TROUBLE RELAXING: SEVERAL DAYS
5. BEING SO RESTLESS THAT IT IS HARD TO SIT STILL: NOT AT ALL
3. WORRYING TOO MUCH ABOUT DIFFERENT THINGS: NOT AT ALL
1. FEELING NERVOUS, ANXIOUS, OR ON EDGE: SEVERAL DAYS
GAD7 TOTAL SCORE: 3
7. FEELING AFRAID AS IF SOMETHING AWFUL MIGHT HAPPEN: NOT AT ALL
GAD7 TOTAL SCORE: 3
2. NOT BEING ABLE TO STOP OR CONTROL WORRYING: NOT AT ALL
6. BECOMING EASILY ANNOYED OR IRRITABLE: SEVERAL DAYS

## 2022-03-15 ASSESSMENT — PATIENT HEALTH QUESTIONNAIRE - PHQ9
SUM OF ALL RESPONSES TO PHQ QUESTIONS 1-9: 3
SUM OF ALL RESPONSES TO PHQ QUESTIONS 1-9: 3
10. IF YOU CHECKED OFF ANY PROBLEMS, HOW DIFFICULT HAVE THESE PROBLEMS MADE IT FOR YOU TO DO YOUR WORK, TAKE CARE OF THINGS AT HOME, OR GET ALONG WITH OTHER PEOPLE: NOT DIFFICULT AT ALL

## 2022-03-15 ASSESSMENT — PAIN SCALES - GENERAL: PAINLEVEL: EXTREME PAIN (8)

## 2022-03-15 NOTE — PROGRESS NOTES
Assessment & Plan     Upper back pain on right side  Does not have any specific bony tenderness.  Rib fracture is a possibility but hopefully less likely as her symptoms are improving and no any focal tender spot. Spine compression fracture is less likely as she does not have midline spinal tenderness we will hold off on spinal x-ray for compression fracture.  No previous testing for osteoporosis.  -We discussed trial with physical therapy and muscle relaxer and if symptom fails to improve come back for imaging and further evaluation.  She understood.  Call for sports medicine referral if you choose to do it.  Muscle relaxer side effects discussed.  - Physical Therapy Referral; Future  - cyclobenzaprine (FLEXERIL) 5 MG tablet; Take 1 tablet (5 mg) by mouth 3 times daily as needed for muscle spasms                 No follow-ups on file.    Dmitriy Randhawa MD, MD  [facility]    Dawit Nicholson is a 62 year old who presents for the following health issues     Back Pain   This is a new problem. The current episode started 1 to 4 weeks ago. The problem occurs constantly. The problem has been gradually improving. The pain is present in the lumbar spine, sacro-iliac and thoracic spine. The quality of the pain is described as aching and stabbing. The pain is at a severity of 7/10. The symptoms are aggravated by bending, position, lying down, sitting and twisting. The pain is worse during the day. Stiffness is present all day. Associated symptoms include weakness. Pertinent negatives include no chest pain, no fever, no numbness, no weight loss, no headaches, no abdominal pain, no bowel incontinence, no perianal numbness, no bladder incontinence, no dysuria, no pelvic pain, no leg pain, no paresthesias, no paresis and no tingling. Risk factors include menopause and recent trauma.      ,much better than before.     March 4th - went to gym and was playing [employer] and was running  and fall on right hip, knee and right back area.   Right back is still painful. Mild bruise on hip and knee but no pain.   Right back area on side is painful. Not going away entirely.   No previous fracture except for her toe.   No history of osteoporosis, did not have screening.    sleep - when trying to fall to sleep - noticing pain. Changing position is still hard.  No tingling or numbness of upper extremities. Some neck stiffness.     Review of Systems   Constitutional: Negative for fever and weight loss.   Cardiovascular: Negative for chest pain.   Gastrointestinal: Negative for abdominal pain and bowel incontinence.   Genitourinary: Negative for bladder incontinence, dysuria and pelvic pain.   Musculoskeletal: Positive for back pain.   Neurological: Positive for weakness. Negative for tingling, numbness, headaches and paresthesias.            Objective    /81 (BP Location: Right arm, Patient Position: Chair, Cuff Size: Adult Regular)   Pulse 91   Temp 97.3  F (36.3  C) (Temporal)   Wt 63 kg (139 lb)   LMP  (LMP Unknown)   SpO2 97%   BMI 25.84 kg/m    Body mass index is 25.84 kg/m .  Physical Exam   Back range of motion is minimally restricted.  Right latissimus dorsi area diffuse tenderness.  No any midline spinal tenderness and no any bony tenderness on her posterior rib cage.               Answers for HPI/ROS submitted by the patient on 3/15/2022  If you checked off any problems, how difficult have these problems made it for you to do your work, take care of things at home, or get along with other people?: Not difficult at all  PHQ9 TOTAL SCORE: 3  LEONOR 7 TOTAL SCORE: 3

## 2022-03-16 ASSESSMENT — ANXIETY QUESTIONNAIRES: GAD7 TOTAL SCORE: 3

## 2022-03-16 ASSESSMENT — PATIENT HEALTH QUESTIONNAIRE - PHQ9: SUM OF ALL RESPONSES TO PHQ QUESTIONS 1-9: 3

## 2022-06-13 ENCOUNTER — IMMUNIZATION (OUTPATIENT)
Dept: NURSING | Facility: CLINIC | Age: 62
End: 2022-06-13
Payer: COMMERCIAL

## 2022-06-13 PROCEDURE — 0064A COVID-19,PF,MODERNA (18+ YRS BOOSTER .25ML): CPT

## 2022-06-13 PROCEDURE — 91306 COVID-19,PF,MODERNA (18+ YRS BOOSTER .25ML): CPT

## 2022-06-14 ENCOUNTER — VIRTUAL VISIT (OUTPATIENT)
Dept: FAMILY MEDICINE | Facility: CLINIC | Age: 62
End: 2022-06-14
Payer: COMMERCIAL

## 2022-06-14 DIAGNOSIS — Z91.09 ENVIRONMENTAL ALLERGIES: Primary | ICD-10-CM

## 2022-06-14 PROCEDURE — 99213 OFFICE O/P EST LOW 20 MIN: CPT | Mod: 95 | Performed by: FAMILY MEDICINE

## 2022-06-14 NOTE — PROGRESS NOTES
"Lyn is a 62 year old who is being evaluated via a billable video visit.      How would you like to obtain your AVS? MyChart  If the video visit is dropped, the invitation should be resent by: Text to cell phone: [phone]  Will anyone else be joining your video visit? No    Video Start Time: 3:15 PM    Assessment & Plan     Environmental allergies  The patient was instructed to switch from Claritin to Allegra for her seasonal allergies.  If she is not improved after few days she should add Flonase.  If she does not feel better after 5 or 6 days she should let me know.  Also suggest that she has today for COVID.  She has a home test she will use.        20 minutes spent on the date of the encounter doing chart review, history and exam, documentation and further activities per the note         Return in about 3 months (around 9/14/2022) for Physical Exam.    Nona Torres [facility]    Dawit Nicholson is a 62 year old who presents for the following health issues     History of Present Illness       Headaches:   Since the patient's last clinic visit, headaches are: no change  The patient is getting headaches:  Daily, while this allergic reaction is going on  She is able to do normal daily activities when she has a migraine.  The patient is taking the following rescue/relief medications:  Other   Patient states \"I get some relief\" from the rescue/relief medications.   The patient is taking the following medications to prevent migraines:  No medications to prevent migraines  In the past 4 weeks, the patient has gone to an Urgent Care or Emergency Room 0 times times due to headaches.    Reason for visit:  Rather severe reaction to tree pollen; seemed to cause sinus infection but may be clearing up?  Symptom onset:  3-7 days ago  Symptoms include:  Post-nasal drip, lots of mucus in nose and sinuses, coughing fits that are brief but rather severe  Symptom intensity:  " Moderate  Symptom progression:  Improving  Had these symptoms before:  Yes  Has tried/received treatment for these symptoms:  Yes  Previous treatment was successful:  Yes  Prior treatment description:  More than 20 years ago; a course of antibiotics, I think  What makes it worse:  Going outside right now, causes coughing and runny nose  What makes it better:  A hot shower    She eats 2-3 servings of fruits and vegetables daily.She consumes 1 sweetened beverage(s) daily.She exercises with enough effort to increase her heart rate 10 to 19 minutes per day.  She exercises with enough effort to increase her heart rate 4 days per week.   She is taking medications regularly.       Acute Illness  Acute illness concerns: Possible sinus infection  Onset/Duration: Last Wednesday 6/8/22 6-7 days   Symptoms:  Fever: no  Chills/Sweats: YES- sometimes  Headache (location?): YES  Sinus Pressure: YES  Conjunctivitis:  no  Ear Pain: YES-  Off and on  Rhinorrhea: YES  Congestion: YES  Sore Throat: YES  Cough: YES-non-productive  Wheeze: no  Decreased Appetite: YES- slight  Nausea: no  Vomiting: no  Diarrhea: YES- loose stools   Dysuria/Freq.: no  Dysuria or Hematuria: no  Fatigue/Achiness: YES  Sick/Strep Exposure: no  Therapies tried and outcome:  Loratadine 10 mg for allergies, aspirin    She started taking loratadine about 3-4 years ago.  She has not been on a different anti-histamines.  She has not been on flonase.     She tested for covid on day two of the symptoms.      Review of Systems   Constitutional, HEENT, cardiovascular, pulmonary, gi and gu systems are negative, except as otherwise noted.      Objective         Vitals:  No vitals were obtained today due to virtual visit.    Physical Exam   GENERAL: Healthy, alert and no distress  EYES: Eyes grossly normal to inspection.  No discharge or erythema, or obvious scleral/conjunctival abnormalities.  RESP: No audible wheeze, cough, or visible cyanosis.  No visible retractions  or increased work of breathing.    SKIN: Visible skin clear. No significant rash, abnormal pigmentation or lesions.  NEURO: Cranial nerves grossly intact.  Mentation and speech appropriate for age.  PSYCH: Mentation appears normal, affect normal/bright, judgement and insight intact, normal speech and appearance well-groomed.            Video-Visit Details    Type of service:  Video Visit    Video End Time:335    Originating Location (pt. Location): Home    Distant Location (provider location):  [facility]     Platform used for Video Visit: [employer]

## 2022-07-24 DIAGNOSIS — G47.00 INSOMNIA, UNSPECIFIED TYPE: ICD-10-CM

## 2022-07-24 DIAGNOSIS — F41.9 ANXIETY: ICD-10-CM

## 2022-07-25 RX ORDER — FLUOXETINE 40 MG/1
CAPSULE ORAL
Qty: 90 CAPSULE | Refills: 1 | Status: SHIPPED | OUTPATIENT
Start: 2022-07-25 | End: 2023-01-16

## 2022-07-25 RX ORDER — MIRTAZAPINE 15 MG/1
TABLET, FILM COATED ORAL
Qty: 90 TABLET | Refills: 1 | Status: SHIPPED | OUTPATIENT
Start: 2022-07-25 | End: 2023-01-16

## 2022-07-25 NOTE — TELEPHONE ENCOUNTER
Routing refill request to provider for review/approval because:  Drug interaction warning    Cathy Alonso, RN

## 2022-08-18 DIAGNOSIS — F41.9 ANXIETY: ICD-10-CM

## 2022-08-19 RX ORDER — BUSPIRONE HYDROCHLORIDE 5 MG/1
TABLET ORAL
Qty: 90 TABLET | Refills: 2 | Status: SHIPPED | OUTPATIENT
Start: 2022-08-19 | End: 2022-11-16

## 2022-08-19 NOTE — TELEPHONE ENCOUNTER
Prescription approved per [facility] Refill Protocol.    Stephanie Webb RN BSN  [facility]

## 2022-11-16 DIAGNOSIS — F41.9 ANXIETY: ICD-10-CM

## 2022-11-16 RX ORDER — BUSPIRONE HYDROCHLORIDE 5 MG/1
TABLET ORAL
Qty: 90 TABLET | Refills: 2 | Status: SHIPPED | OUTPATIENT
Start: 2022-11-16 | End: 2023-02-17

## 2022-11-19 ENCOUNTER — HEALTH MAINTENANCE LETTER (OUTPATIENT)
Age: 62
End: 2022-11-19

## 2023-01-11 ENCOUNTER — TRANSFERRED RECORDS (OUTPATIENT)
Dept: MULTI SPECIALTY CLINIC | Facility: CLINIC | Age: 63
End: 2023-01-11

## 2023-01-11 LAB — RETINOPATHY: NORMAL

## 2023-01-16 ENCOUNTER — MYC MEDICAL ADVICE (OUTPATIENT)
Dept: FAMILY MEDICINE | Facility: CLINIC | Age: 63
End: 2023-01-16
Payer: COMMERCIAL

## 2023-01-16 DIAGNOSIS — G47.00 INSOMNIA, UNSPECIFIED TYPE: ICD-10-CM

## 2023-01-16 DIAGNOSIS — F41.9 ANXIETY: ICD-10-CM

## 2023-01-17 RX ORDER — FLUOXETINE 40 MG/1
CAPSULE ORAL
Qty: 90 CAPSULE | Refills: 0 | Status: SHIPPED | OUTPATIENT
Start: 2023-01-17 | End: 2023-02-21

## 2023-01-17 RX ORDER — MIRTAZAPINE 15 MG/1
TABLET, FILM COATED ORAL
Qty: 90 TABLET | Refills: 0 | Status: SHIPPED | OUTPATIENT
Start: 2023-01-17 | End: 2023-02-21

## 2023-01-17 NOTE — TELEPHONE ENCOUNTER
PCP out of the office.   Not pended meds in this encounter  Reviewed  Epic .   already refilled and pharmacy received this am  Please forward to [place] team if needs to be refilled again  Thanks  Bravo Snyder D.O.

## 2023-02-15 ASSESSMENT — ENCOUNTER SYMPTOMS
CHILLS: 0
WEAKNESS: 0
SORE THROAT: 0
MYALGIAS: 0
ARTHRALGIAS: 1
PARESTHESIAS: 0
NAUSEA: 0
HEMATOCHEZIA: 0
BREAST MASS: 0
EYE PAIN: 0
FREQUENCY: 0
COUGH: 0
NERVOUS/ANXIOUS: 0
HEMATURIA: 0
FEVER: 0
JOINT SWELLING: 0
HEARTBURN: 0
CONSTIPATION: 0
ABDOMINAL PAIN: 0
DYSURIA: 0
PALPITATIONS: 0
HEADACHES: 0
DIZZINESS: 0
SHORTNESS OF BREATH: 1
DIARRHEA: 0

## 2023-02-21 ENCOUNTER — OFFICE VISIT (OUTPATIENT)
Dept: FAMILY MEDICINE | Facility: CLINIC | Age: 63
End: 2023-02-21
Payer: COMMERCIAL

## 2023-02-21 VITALS
BODY MASS INDEX: 31.79 KG/M2 | TEMPERATURE: 98.4 F | RESPIRATION RATE: 20 BRPM | HEIGHT: 61 IN | SYSTOLIC BLOOD PRESSURE: 124 MMHG | DIASTOLIC BLOOD PRESSURE: 80 MMHG | HEART RATE: 80 BPM | OXYGEN SATURATION: 96 % | WEIGHT: 168.4 LBS

## 2023-02-21 DIAGNOSIS — Z13.1 SCREENING FOR DIABETES MELLITUS: ICD-10-CM

## 2023-02-21 DIAGNOSIS — Z13.220 LIPID SCREENING: ICD-10-CM

## 2023-02-21 DIAGNOSIS — F41.9 ANXIETY: ICD-10-CM

## 2023-02-21 DIAGNOSIS — G47.00 INSOMNIA, UNSPECIFIED TYPE: ICD-10-CM

## 2023-02-21 DIAGNOSIS — Z00.00 ROUTINE GENERAL MEDICAL EXAMINATION AT A HEALTH CARE FACILITY: Primary | ICD-10-CM

## 2023-02-21 LAB
ANION GAP SERPL CALCULATED.3IONS-SCNC: 16 MMOL/L (ref 7–15)
BUN SERPL-MCNC: 21 MG/DL (ref 8–23)
CALCIUM SERPL-MCNC: 9.3 MG/DL (ref 8.8–10.2)
CHLORIDE SERPL-SCNC: 106 MMOL/L (ref 98–107)
CHOLEST SERPL-MCNC: 239 MG/DL
CREAT SERPL-MCNC: 0.85 MG/DL (ref 0.51–0.95)
DEPRECATED HCO3 PLAS-SCNC: 19 MMOL/L (ref 22–29)
GFR SERPL CREATININE-BSD FRML MDRD: 76 ML/MIN/1.73M2
GLUCOSE SERPL-MCNC: 98 MG/DL (ref 70–99)
HDLC SERPL-MCNC: 97 MG/DL
LDLC SERPL CALC-MCNC: 117 MG/DL
NONHDLC SERPL-MCNC: 142 MG/DL
POTASSIUM SERPL-SCNC: 4.2 MMOL/L (ref 3.4–5.3)
SODIUM SERPL-SCNC: 141 MMOL/L (ref 136–145)
TRIGL SERPL-MCNC: 127 MG/DL

## 2023-02-21 PROCEDURE — 36415 COLL VENOUS BLD VENIPUNCTURE: CPT | Performed by: FAMILY MEDICINE

## 2023-02-21 PROCEDURE — 80061 LIPID PANEL: CPT | Performed by: FAMILY MEDICINE

## 2023-02-21 PROCEDURE — 99396 PREV VISIT EST AGE 40-64: CPT | Performed by: FAMILY MEDICINE

## 2023-02-21 PROCEDURE — 80048 BASIC METABOLIC PNL TOTAL CA: CPT | Performed by: FAMILY MEDICINE

## 2023-02-21 RX ORDER — BUSPIRONE HYDROCHLORIDE 5 MG/1
TABLET ORAL
Qty: 270 TABLET | Refills: 3 | Status: SHIPPED | OUTPATIENT
Start: 2023-02-21 | End: 2024-03-06

## 2023-02-21 RX ORDER — MIRTAZAPINE 15 MG/1
TABLET, FILM COATED ORAL
Qty: 90 TABLET | Refills: 3 | Status: SHIPPED | OUTPATIENT
Start: 2023-02-21 | End: 2024-04-08

## 2023-02-21 RX ORDER — FLUOXETINE 40 MG/1
CAPSULE ORAL
Qty: 90 CAPSULE | Refills: 3 | Status: SHIPPED | OUTPATIENT
Start: 2023-02-21 | End: 2024-03-06

## 2023-02-21 ASSESSMENT — PATIENT HEALTH QUESTIONNAIRE - PHQ9
10. IF YOU CHECKED OFF ANY PROBLEMS, HOW DIFFICULT HAVE THESE PROBLEMS MADE IT FOR YOU TO DO YOUR WORK, TAKE CARE OF THINGS AT HOME, OR GET ALONG WITH OTHER PEOPLE: NOT DIFFICULT AT ALL
SUM OF ALL RESPONSES TO PHQ QUESTIONS 1-9: 1
SUM OF ALL RESPONSES TO PHQ QUESTIONS 1-9: 1

## 2023-02-21 ASSESSMENT — ENCOUNTER SYMPTOMS
DIARRHEA: 0
CONSTIPATION: 0
MYALGIAS: 0
SORE THROAT: 0
SHORTNESS OF BREATH: 1
EYE PAIN: 0
PARESTHESIAS: 0
FEVER: 0
HEADACHES: 0
JOINT SWELLING: 0
WEAKNESS: 0
NAUSEA: 0
BREAST MASS: 0
HEMATURIA: 0
NERVOUS/ANXIOUS: 0
FREQUENCY: 0
ABDOMINAL PAIN: 0
CHILLS: 0
HEARTBURN: 0
DYSURIA: 0
HEMATOCHEZIA: 0
ARTHRALGIAS: 1
DIZZINESS: 0
COUGH: 0
PALPITATIONS: 0

## 2023-02-21 ASSESSMENT — ANXIETY QUESTIONNAIRES
IF YOU CHECKED OFF ANY PROBLEMS ON THIS QUESTIONNAIRE, HOW DIFFICULT HAVE THESE PROBLEMS MADE IT FOR YOU TO DO YOUR WORK, TAKE CARE OF THINGS AT HOME, OR GET ALONG WITH OTHER PEOPLE: NOT DIFFICULT AT ALL
6. BECOMING EASILY ANNOYED OR IRRITABLE: NOT AT ALL
7. FEELING AFRAID AS IF SOMETHING AWFUL MIGHT HAPPEN: NOT AT ALL
1. FEELING NERVOUS, ANXIOUS, OR ON EDGE: NOT AT ALL
GAD7 TOTAL SCORE: 1
7. FEELING AFRAID AS IF SOMETHING AWFUL MIGHT HAPPEN: NOT AT ALL
4. TROUBLE RELAXING: NOT AT ALL
5. BEING SO RESTLESS THAT IT IS HARD TO SIT STILL: NOT AT ALL
8. IF YOU CHECKED OFF ANY PROBLEMS, HOW DIFFICULT HAVE THESE MADE IT FOR YOU TO DO YOUR WORK, TAKE CARE OF THINGS AT HOME, OR GET ALONG WITH OTHER PEOPLE?: NOT DIFFICULT AT ALL
GAD7 TOTAL SCORE: 1
2. NOT BEING ABLE TO STOP OR CONTROL WORRYING: NOT AT ALL
GAD7 TOTAL SCORE: 1
3. WORRYING TOO MUCH ABOUT DIFFERENT THINGS: SEVERAL DAYS

## 2023-02-21 ASSESSMENT — PAIN SCALES - GENERAL: PAINLEVEL: NO PAIN (0)

## 2023-02-21 NOTE — PROGRESS NOTES
SUBJECTIVE:   CC: Lyn is an 62 year old who presents for preventive health visit.     Patient has been advised of split billing requirements and indicates understanding: Yes     Healthy Habits:     Getting at least 3 servings of Calcium per day:  NO    Bi-annual eye exam:  NO    Dental care twice a year:  Yes    Sleep apnea or symptoms of sleep apnea:  None    Diet:  Regular (no restrictions)    Frequency of exercise:  4-5 days/week    Duration of exercise:  15-30 minutes    Taking medications regularly:  Yes    Medication side effects:  Not applicable    PHQ-2 Total Score: 0    Additional concerns today:  Yes    --Fasting today and would like to be checked for diabetes.  Her mammogram is due.  She had a colonoscopy 2022    Depression and Anxiety Follow-Up    How are you doing with your depression since your last visit? Improved     How are you doing with your anxiety since your last visit?  Improved     Are you having other symptoms that might be associated with depression or anxiety? No    Have you had a significant life event? No     Do you have any concerns with your use of alcohol or other drugs? No     Fluoxetine 40 mg daily, mirtazapine 15 mg at at bedtime, and BuSpar 10 mg in a.m. and 5 mg in the p.m. the BuSpar was added approximately 1 year ago to help with breakthrough anxiety.    Social History     Tobacco Use     Smoking status: Former     Packs/day: 2.00     Years: 12.00     Pack years: 24.00     Types: Cigarettes     Start date: 1971     Quit date: 1984     Years since quittin.6     Passive exposure: Never     Smokeless tobacco: Never   Vaping Use     Vaping Use: Never used   Substance Use Topics     Alcohol use: Yes     Comment: 3-5 per week     Drug use: No     PHQ 2022 3/15/2022 2023   PHQ-9 Total Score 1 3 1   Q9: Thoughts of better off dead/self-harm past 2 weeks Not at all Not at all Not at all   Some encounter information is confidential and restricted.  Go to Review [employer] activity to see all data.     LEONOR-7 SCORE 1/30/2022 3/15/2022 2/21/2023   Total Score - - -   Total Score 1 (minimal anxiety) 3 (minimal anxiety) 1 (minimal anxiety)   Total Score 1 3 1     Last PHQ-9 2/21/2023   1.  Little interest or pleasure in doing things 0   2.  Feeling down, depressed, or hopeless 0   3.  Trouble falling or staying asleep, or sleeping too much 0   4.  Feeling tired or having little energy 0   5.  Poor appetite or overeating 1   6.  Feeling bad about yourself 0   7.  Trouble concentrating 0   8.  Moving slowly or restless 0   Q9: Thoughts of better off dead/self-harm past 2 weeks 0   PHQ-9 Total Score 1   Difficulty at work, home, or with people -   Some encounter information is confidential and restricted. Go to Review [employer] activity to see all data.     LEONOR-7  2/21/2023   1. Feeling nervous, anxious, or on edge 0   2. Not being able to stop or control worrying 0   3. Worrying too much about different things 1   4. Trouble relaxing 0   5. Being so restless that it is hard to sit still 0   6. Becoming easily annoyed or irritable 0   7. Feeling afraid, as if something awful might happen 0   LEONOR-7 Total Score 1   If you checked any problems, how difficult have they made it for you to do your work, take care of things at home, or get along with other people? Not difficult at all       Suicide Assessment Five-step Evaluation and Treatment (SAFE-T)        Today's PHQ-2 Score:   PHQ-2 ( 1999 [employer]) 2/15/2023   Q1: Little interest or pleasure in doing things 0   Q2: Feeling down, depressed or hopeless 0   PHQ-2 Score 0   PHQ-2 Total Score (12-17 Years)- Positive if 3 or more points; Administer PHQ-A if positive -   Q1: Little interest or pleasure in doing things Not at all   Q2: Feeling down, depressed or hopeless Not at all   PHQ-2 Score 0       Social History     Tobacco Use     Smoking status: Former     Packs/day: 2.00     Years: 12.00     Pack years: 24.00      Types: Cigarettes     Start date: 1971     Quit date: 1984     Years since quittin.6     Passive exposure: Never     Smokeless tobacco: Never   Substance Use Topics     Alcohol use: Yes     Comment: 3-5 per week     If you drink alcohol do you typically have >3 drinks per day or >7 drinks per week? No    No flowsheet data found.    Reviewed orders with patient.  Reviewed health maintenance and updated orders accordingly - Yes  BP Readings from Last 3 Encounters:   23 124/80   03/15/22 123/81   22 110/68    Wt Readings from Last 3 Encounters:   23 76.4 kg (168 lb 6.4 oz)   03/15/22 63 kg (139 lb)   22 60 kg (132 lb 4.4 oz)                    Breast Cancer Screening:    Breast CA Risk Assessment (FHS-7) 2/15/2023   Do you have a family history of breast, colon, or ovarian cancer? No / Unknown       click delete button to remove this line now  Mammogram Screening: Recommended mammography every 1-2 years with patient discussion and risk factor consideration  Pertinent mammograms are reviewed under the imaging tab.    History of abnormal Pap smear: NO - age 30-65 PAP every 5 years with negative HPV co-testing recommended  PAP / HPV Latest Ref Rng & Units 2019 3/26/2014   PAP (Historical) - NIL NIL   HPV16 NEG:Negative Negative -   HPV18 NEG:Negative Negative -   HRHPV NEG:Negative Negative -     Reviewed and updated as needed this visit by clinical staff   Tobacco  Allergies  Meds              Reviewed and updated as needed this visit by Provider                     Review of Systems   Constitutional: Negative for chills and fever.   HENT: Positive for congestion. Negative for ear pain, hearing loss and sore throat.    Eyes: Negative for pain and visual disturbance.   Respiratory: Positive for shortness of breath. Negative for cough.    Cardiovascular: Negative for chest pain, palpitations and peripheral edema.   Gastrointestinal: Negative for abdominal pain, constipation,  "diarrhea, heartburn, hematochezia and nausea.   Breasts:  Negative for tenderness, breast mass and discharge.   Genitourinary: Negative for dysuria, frequency, genital sores, hematuria, pelvic pain, urgency, vaginal bleeding and vaginal discharge.   Musculoskeletal: Positive for arthralgias. Negative for joint swelling and myalgias.   Skin: Negative for rash.   Neurological: Negative for dizziness, weakness, headaches and paresthesias.   Psychiatric/Behavioral: Negative for mood changes. The patient is not nervous/anxious.           OBJECTIVE:   /80 (BP Location: Right arm, Patient Position: Sitting, Cuff Size: Adult Regular)   Pulse 80   Temp 98.4  F (36.9  C) (Oral)   Resp 20   Ht 1.537 m (5' 0.5\")   Wt 76.4 kg (168 lb 6.4 oz)   LMP  (LMP Unknown)   SpO2 96%   BMI 32.35 kg/m    Physical Exam  GENERAL: healthy, alert and no distress  EYES: Eyes grossly normal to inspection, PERRL and conjunctivae and sclerae normal  HENT: ear canals and TM's normal, nose and mouth without ulcers or lesions  NECK: no adenopathy, no asymmetry, masses, or scars and thyroid normal to palpation  RESP: lungs clear to auscultation - no rales, rhonchi or wheezes  BREAST: normal without masses, tenderness or nipple discharge and no palpable axillary masses or adenopathy  CV: regular rate and rhythm, normal S1 S2, no S3 or S4, no murmur, click or rub, no peripheral edema and peripheral pulses strong  ABDOMEN: soft, nontender, no hepatosplenomegaly, no masses and bowel sounds normal  MS: no gross musculoskeletal defects noted, no edema  SKIN: no suspicious lesions or rashes  NEURO: Normal strength and tone, mentation intact and speech normal  PSYCH: mentation appears normal, affect normal/bright    Diagnostic Test Results:  Labs reviewed in Epic    ASSESSMENT/PLAN:   (Z00.00) Routine general medical examination at a health care facility  (primary encounter diagnosis)  Comment:   Plan:     (G47.00) Insomnia, unspecified " type  Comment:   Plan: mirtazapine (REMERON) 15 MG tablet        The current medical regimen is effective;  continue present plan and medications.      (F41.9) Anxiety  Comment: The current medical regimen is effective;  continue present plan and medications.    Plan: FLUoxetine (PROZAC) 40 MG capsule, busPIRone         (BUSPAR) 5 MG tablet            (Z13.220) Lipid screening  Comment:   Plan: Lipid panel reflex to direct LDL Fasting            (Z13.1) Screening for diabetes mellitus  Comment:   Plan: Basic metabolic panel  (Ca, Cl, CO2, Creat,         Gluc, K, Na, BUN)                    COUNSELING:  Reviewed preventive health counseling, as reflected in patient instructions       Regular exercise       Healthy diet/nutrition       Osteoporosis prevention/bone health        She reports that she quit smoking about 38 years ago. Her smoking use included cigarettes. She started smoking about 51 years ago. She has a 24.00 pack-year smoking history. She has never been exposed to tobacco smoke. She has never used smokeless tobacco.      Nona Torres DO  [facility]

## 2023-09-05 ENCOUNTER — DOCUMENTATION ONLY (OUTPATIENT)
Dept: OTHER | Facility: CLINIC | Age: 63
End: 2023-09-05
Payer: COMMERCIAL

## 2024-03-16 SDOH — HEALTH STABILITY: PHYSICAL HEALTH: ON AVERAGE, HOW MANY MINUTES DO YOU ENGAGE IN EXERCISE AT THIS LEVEL?: 30 MIN

## 2024-03-16 SDOH — HEALTH STABILITY: PHYSICAL HEALTH: ON AVERAGE, HOW MANY DAYS PER WEEK DO YOU ENGAGE IN MODERATE TO STRENUOUS EXERCISE (LIKE A BRISK WALK)?: 4 DAYS

## 2024-03-16 ASSESSMENT — ANXIETY QUESTIONNAIRES
6. BECOMING EASILY ANNOYED OR IRRITABLE: NOT AT ALL
5. BEING SO RESTLESS THAT IT IS HARD TO SIT STILL: NOT AT ALL
4. TROUBLE RELAXING: NOT AT ALL
IF YOU CHECKED OFF ANY PROBLEMS ON THIS QUESTIONNAIRE, HOW DIFFICULT HAVE THESE PROBLEMS MADE IT FOR YOU TO DO YOUR WORK, TAKE CARE OF THINGS AT HOME, OR GET ALONG WITH OTHER PEOPLE: NOT DIFFICULT AT ALL
1. FEELING NERVOUS, ANXIOUS, OR ON EDGE: SEVERAL DAYS
2. NOT BEING ABLE TO STOP OR CONTROL WORRYING: NOT AT ALL
GAD7 TOTAL SCORE: 1
7. FEELING AFRAID AS IF SOMETHING AWFUL MIGHT HAPPEN: NOT AT ALL
3. WORRYING TOO MUCH ABOUT DIFFERENT THINGS: NOT AT ALL

## 2024-03-16 ASSESSMENT — PATIENT HEALTH QUESTIONNAIRE - PHQ9: SUM OF ALL RESPONSES TO PHQ QUESTIONS 1-9: 0

## 2024-03-16 ASSESSMENT — SOCIAL DETERMINANTS OF HEALTH (SDOH): HOW OFTEN DO YOU GET TOGETHER WITH FRIENDS OR RELATIVES?: TWICE A WEEK

## 2024-03-21 ENCOUNTER — TELEPHONE (OUTPATIENT)
Dept: FAMILY MEDICINE | Facility: CLINIC | Age: 64
End: 2024-03-21

## 2024-03-21 NOTE — TELEPHONE ENCOUNTER
Patient Quality Outreach    Patient is due for the following:   Physical Annual Wellness Visit    Next Steps:   Schedule a Annual Wellness Visit    Type of outreach:    Chart review performed, no outreach needed.    Patient has appt scheduled for 4/8/24        Questions for provider review:    None           Millie Chirinos CMA

## 2024-04-06 ENCOUNTER — HEALTH MAINTENANCE LETTER (OUTPATIENT)
Age: 64
End: 2024-04-06

## 2024-04-06 SDOH — HEALTH STABILITY: PHYSICAL HEALTH: ON AVERAGE, HOW MANY MINUTES DO YOU ENGAGE IN EXERCISE AT THIS LEVEL?: 30 MIN

## 2024-04-06 SDOH — HEALTH STABILITY: PHYSICAL HEALTH: ON AVERAGE, HOW MANY DAYS PER WEEK DO YOU ENGAGE IN MODERATE TO STRENUOUS EXERCISE (LIKE A BRISK WALK)?: 4 DAYS

## 2024-04-06 ASSESSMENT — ANXIETY QUESTIONNAIRES
GAD7 TOTAL SCORE: 1
7. FEELING AFRAID AS IF SOMETHING AWFUL MIGHT HAPPEN: NOT AT ALL
2. NOT BEING ABLE TO STOP OR CONTROL WORRYING: NOT AT ALL
GAD7 TOTAL SCORE: 1
3. WORRYING TOO MUCH ABOUT DIFFERENT THINGS: NOT AT ALL
8. IF YOU CHECKED OFF ANY PROBLEMS, HOW DIFFICULT HAVE THESE MADE IT FOR YOU TO DO YOUR WORK, TAKE CARE OF THINGS AT HOME, OR GET ALONG WITH OTHER PEOPLE?: NOT DIFFICULT AT ALL
6. BECOMING EASILY ANNOYED OR IRRITABLE: NOT AT ALL
5. BEING SO RESTLESS THAT IT IS HARD TO SIT STILL: NOT AT ALL
4. TROUBLE RELAXING: NOT AT ALL
1. FEELING NERVOUS, ANXIOUS, OR ON EDGE: SEVERAL DAYS
IF YOU CHECKED OFF ANY PROBLEMS ON THIS QUESTIONNAIRE, HOW DIFFICULT HAVE THESE PROBLEMS MADE IT FOR YOU TO DO YOUR WORK, TAKE CARE OF THINGS AT HOME, OR GET ALONG WITH OTHER PEOPLE: NOT DIFFICULT AT ALL
7. FEELING AFRAID AS IF SOMETHING AWFUL MIGHT HAPPEN: NOT AT ALL
GAD7 TOTAL SCORE: 1

## 2024-04-06 ASSESSMENT — SOCIAL DETERMINANTS OF HEALTH (SDOH): HOW OFTEN DO YOU GET TOGETHER WITH FRIENDS OR RELATIVES?: TWICE A WEEK

## 2024-04-08 ENCOUNTER — OFFICE VISIT (OUTPATIENT)
Dept: FAMILY MEDICINE | Facility: CLINIC | Age: 64
End: 2024-04-08
Payer: COMMERCIAL

## 2024-04-08 VITALS
TEMPERATURE: 97.5 F | SYSTOLIC BLOOD PRESSURE: 122 MMHG | OXYGEN SATURATION: 97 % | HEART RATE: 83 BPM | DIASTOLIC BLOOD PRESSURE: 80 MMHG | RESPIRATION RATE: 16 BRPM | WEIGHT: 152 LBS | BODY MASS INDEX: 29.84 KG/M2 | HEIGHT: 60 IN

## 2024-04-08 DIAGNOSIS — S63.634A SPRAIN OF INTERPHALANGEAL JOINT OF RIGHT RING FINGER, INITIAL ENCOUNTER: ICD-10-CM

## 2024-04-08 DIAGNOSIS — Z13.1 SCREENING FOR DIABETES MELLITUS: ICD-10-CM

## 2024-04-08 DIAGNOSIS — Z13.220 LIPID SCREENING: ICD-10-CM

## 2024-04-08 DIAGNOSIS — F33.1 MAJOR DEPRESSIVE DISORDER, RECURRENT EPISODE, MODERATE (H): ICD-10-CM

## 2024-04-08 DIAGNOSIS — F41.9 ANXIETY: ICD-10-CM

## 2024-04-08 DIAGNOSIS — H90.3 BILATERAL SENSORINEURAL HEARING LOSS: ICD-10-CM

## 2024-04-08 DIAGNOSIS — Z13.820 ENCOUNTER FOR OSTEOPOROSIS SCREENING IN ASYMPTOMATIC POSTMENOPAUSAL PATIENT: ICD-10-CM

## 2024-04-08 DIAGNOSIS — Z12.31 VISIT FOR SCREENING MAMMOGRAM: ICD-10-CM

## 2024-04-08 DIAGNOSIS — Z00.00 ENCOUNTER FOR MEDICARE ANNUAL WELLNESS EXAM: Primary | ICD-10-CM

## 2024-04-08 DIAGNOSIS — Z78.0 ENCOUNTER FOR OSTEOPOROSIS SCREENING IN ASYMPTOMATIC POSTMENOPAUSAL PATIENT: ICD-10-CM

## 2024-04-08 PROCEDURE — 91320 SARSCV2 VAC 30MCG TRS-SUC IM: CPT | Performed by: FAMILY MEDICINE

## 2024-04-08 PROCEDURE — G0438 PPPS, INITIAL VISIT: HCPCS | Mod: 25 | Performed by: FAMILY MEDICINE

## 2024-04-08 PROCEDURE — 90480 ADMN SARSCOV2 VAC 1/ONLY CMP: CPT | Performed by: FAMILY MEDICINE

## 2024-04-08 PROCEDURE — 80061 LIPID PANEL: CPT | Performed by: FAMILY MEDICINE

## 2024-04-08 PROCEDURE — 99214 OFFICE O/P EST MOD 30 MIN: CPT | Mod: 25 | Performed by: FAMILY MEDICINE

## 2024-04-08 PROCEDURE — 80048 BASIC METABOLIC PNL TOTAL CA: CPT | Performed by: FAMILY MEDICINE

## 2024-04-08 PROCEDURE — 36415 COLL VENOUS BLD VENIPUNCTURE: CPT | Performed by: FAMILY MEDICINE

## 2024-04-08 RX ORDER — BUSPIRONE HYDROCHLORIDE 5 MG/1
TABLET ORAL
Qty: 270 TABLET | Refills: 3 | Status: SHIPPED | OUTPATIENT
Start: 2024-04-08

## 2024-04-08 RX ORDER — FLUOXETINE 40 MG/1
CAPSULE ORAL
Qty: 90 CAPSULE | Refills: 3 | Status: SHIPPED | OUTPATIENT
Start: 2024-04-08

## 2024-04-08 ASSESSMENT — PATIENT HEALTH QUESTIONNAIRE - PHQ9
10. IF YOU CHECKED OFF ANY PROBLEMS, HOW DIFFICULT HAVE THESE PROBLEMS MADE IT FOR YOU TO DO YOUR WORK, TAKE CARE OF THINGS AT HOME, OR GET ALONG WITH OTHER PEOPLE: NOT DIFFICULT AT ALL
SUM OF ALL RESPONSES TO PHQ QUESTIONS 1-9: 0
SUM OF ALL RESPONSES TO PHQ QUESTIONS 1-9: 0

## 2024-04-08 ASSESSMENT — PAIN SCALES - GENERAL: PAINLEVEL: NO PAIN (0)

## 2024-04-08 NOTE — PATIENT INSTRUCTIONS
Preventive Care Advice   This is general advice given by our system to help you stay healthy. However, your care team may have specific advice just for you. Please talk to your care team about your preventive care needs.  Nutrition  Eat 5 or more servings of fruits and vegetables each day.  Try wheat bread, brown rice and whole grain pasta (instead of white bread, rice, and pasta).  Get enough calcium and vitamin D. Check the label on foods and aim for 100% of the RDA (recommended daily allowance).  Lifestyle  Exercise at least 150 minutes each week   (30 minutes a day, 5 days a week).  Do muscle strengthening activities 2 days a week. These help control your weight and prevent disease.  No smoking.  Wear sunscreen to prevent skin cancer.  Have a dental exam and cleaning every 6 months.  Yearly exams  See your health care team every year to talk about:  Any changes in your health.  Any medicines your care team has prescribed.  Preventive care, family planning, and ways to prevent chronic diseases.  Shots (vaccines)   HPV shots (up to age 26), if you've never had them before.  Hepatitis B shots (up to age 59), if you've never had them before.  COVID-19 shot: Get this shot when it's due.  Flu shot: Get a flu shot every year.  Tetanus shot: Get a tetanus shot every 10 years.  Pneumococcal, hepatitis A, and RSV shots: Ask your care team if you need these based on your risk.  Shingles shot (for age 50 and up).  General health tests  Diabetes screening:  Starting at age 35, Get screened for diabetes at least every 3 years.  If you are younger than age 35, ask your care team if you should be screened for diabetes.  Cholesterol test: At age 39, start having a cholesterol test every 5 years, or more often if advised.  Bone density scan (DEXA): At age 50, ask your care team if you should have this scan for osteoporosis (brittle bones).  Hepatitis C: Get tested at least once in your life.  STIs (sexually transmitted  infections)  Before age 24: Ask your care team if you should be screened for STIs.  After age 24: Get screened for STIs if you're at risk. You are at risk for STIs (including HIV) if:  You are sexually active with more than one person.  You don't use condoms every time.  You or a partner was diagnosed with a sexually transmitted infection.  If you are at risk for HIV, ask about PrEP medicine to prevent HIV.  Get tested for HIV at least once in your life, whether you are at risk for HIV or not.  Cancer screening tests  Cervical cancer screening: If you have a cervix, begin getting regular cervical cancer screening tests at age 21. Most people who have regular screenings with normal results can stop after age 65. Talk about this with your provider.  Breast cancer scan (mammogram): If you've ever had breasts, begin having regular mammograms starting at age 40. This is a scan to check for breast cancer.  Colon cancer screening: It is important to start screening for colon cancer at age 45.  Have a colonoscopy test every 10 years (or more often if you're at risk) Or, ask your provider about stool tests like a FIT test every year or Cologuard test every 3 years.  To learn more about your testing options, visit: https://www.GiveForward/840078.pdf.  For help making a decision, visit: https://bit.ly/ai64823.  Prostate cancer screening test: If you have a prostate and are age 55 to 69, ask your provider if you would benefit from a yearly prostate cancer screening test.  Lung cancer screening: If you are a current or former smoker age 50 to 80, ask your care team if ongoing lung cancer screenings are right for you.  For informational purposes only. Not to replace the advice of your health care provider. Copyright   2023 [employer]. All rights reserved. Clinically reviewed by the [facility] Transitions Program. [employer] [id number] - REV 01/24.    Nutrition for Older Adults: Care Instructions  Overview      Good nutrition is important at any age. But it is especially important for older adults. Eating healthy foods helps keep your body strong. And it can help lower your risk for disease.  As you get older, your body needs more of certain nutrients. These include vitamin B12, calcium, and vitamin D. But it may be harder for you to get these and other important nutrients. This could be for many reasons. You may not feel as hungry as you used to. Or you could have problems with your teeth or mouth that make it hard to chew. Or you may not enjoy planning and preparing meals, especially if you live alone.  Talk with your doctor if you want help getting the most nutrition from what you eat. They may have you work with a dietitian to help you plan meals.  Follow-up care is a key part of your treatment and safety. Be sure to make and go to all appointments, and call your doctor if you are having problems. It's also a good idea to know your test results and keep a list of the medicines you take.  How can you care for yourself at home?  To stay healthy  Eat a variety of foods. The more you vary the foods you eat, the more vitamins, minerals, and other nutrients you get.  Ask your doctor if you should take a multivitamin. Choose one with about 100% of the daily value (DV) for vitamins and minerals. Do not take more than 100% of the daily value for any vitamin or mineral unless your doctor tells you to. Talk with your doctor if you are not sure which multivitamin is right for you.  Try to eat lots of fruits and vegetables. Fresh or frozen vegetables and fruits are healthy choices. Choose canned vegetables that have no salt added and fruits that are canned in their own juice or light syrup.  Include foods that are high in vitamin B12 in your diet. Good choices are fortified breakfast cereal, nonfat or low-fat milk and other dairy products, meat, poultry, fish, and eggs.  Get enough calcium and vitamin D. Good choices include  nonfat or low-fat milk, cheese, and yogurt. Other good options are tofu, orange juice with added calcium, and some leafy green vegetables, such as sarah greens and kale. If you don't use milk products, talk to your doctor about calcium and vitamin D supplements.  Try to eat protein foods every day. Good choices include lean meat, fish, poultry, eggs, and cheese. Other good options are cooked beans, peanut butter, and nuts and seeds.  Choose whole grains for half of the grains you eat. Look for 100% whole wheat bread, whole-grain cereals, brown rice, and other whole grains.  If you have constipation  Eat high-fiber foods every day if you can. These include fruits, vegetables, cooked dried beans, and whole grains.  Drink plenty of fluids. If you have kidney, heart, or liver disease and have to limit fluids, talk with your doctor before you increase the amount of fluids you drink.  Ask your doctor if stool softeners may help keep your bowels regular.  If you have mouth problems that make chewing hard  Pick canned or cooked fruits and vegetables. These are often softer.  Chop or shred meat, poultry, and fish. Add sauce or gravy to the meat to help keep it moist.  Pick other protein foods that are soft. These include cheese, peanut butter, cooked beans, cottage cheese, and eggs.  If you have trouble shopping for yourself  Ask a local food store to deliver groceries to your home.  Contact your local area agency on aging and ask about resources that can help.  Ask a family member or neighbor to help you.  If you have trouble preparing meals  Try easier cooking methods such as using a slow cooker or microwave oven.  Let the grocery store do some of the work for you. Look for precut, washed, and ready-to-eat foods.  Take part in group meal programs. You can find these through senior citizen programs.  Have meals brought to your home. Your community may offer programs that deliver meals, such as Meals on Wheels. Or you  "could use an online meal delivery service.  If you are able, take a cooking class.  If your appetite is poor  Try to eat meals on a regular schedule. It may help to eat smaller meals more often throughout the day.  If you can, eat some meals with other people. You could ask family or friends to eat with you. Or you could take part in group meal programs offered in your community.  Ask your doctor if your medicines could cause appetite or taste problems. If so, ask about changing medicines.  Add spices and herbs to increase the flavor of food.  If you think you are depressed, ask your doctor for help. Depression can affect your appetite. And it can make it hard to do everyday activities like grocery shopping and making meals. Treatment can help.  When should you call for help?  Watch closely for changes in your health, and be sure to contact your doctor if you have any problems.  Where can you learn more?  Go to https://www.Help/Systems.[employer]/patiented  Enter L643 in the search box to learn more about \"Nutrition for Older Adults: Care Instructions.\"  Current as of: September 25, 2023               Content Version: 14.0    9154-4646 [employer].   Care instructions adapted under license by your healthcare professional. If you have questions about a medical condition or this instruction, always ask your healthcare professional. [employer] disclaims any warranty or liability for your use of this information.      Learning About Activities of Daily Living  What are activities of daily living?     Activities of daily living (ADLs) are the basic self-care tasks you do every day. These include eating, bathing, dressing, and moving around.  As you age, and if you have health problems, you may find that it's harder to do some of these tasks. If so, your doctor can suggest ideas that may help.  To measure what kind of help you may need, your doctor will ask how well you are able to do ADLs. Let your " doctor know if there are any tasks that you are having trouble doing. This is an important first step to getting help. And when you have the help you need, you can stay as independent as possible.  How will a doctor assess your ADLs?  Asking about ADLs is part of a routine health checkup your doctor will likely do as you age. Your health check might be done in a doctor's office, in your home, or at a hospital. The goal is to find out if you are having any problems that could make it hard to care for yourself or that make it unsafe for you to be on your own.  To measure your ADLs, your doctor will ask how hard it is for you to do routine tasks. Your doctor may also want to know if you have changed the way you do a task because of a health problem. Your doctor may watch how you:  Walk back and forth.  Keep your balance while you stand or walk.  Move from sitting to standing or from a bed to a chair.  Button or unbutton a shirt or sweater.  Remove and put on your shoes.  It's common to feel a little worried or anxious if you find you can't do all the things you used to be able to do. Talking with your doctor about ADLs is a way to make sure you're as safe as possible and able to care for yourself as well as you can. You may want to bring a caregiver, friend, or family member to your checkup. They can help you talk to your doctor.  Follow-up care is a key part of your treatment and safety. Be sure to make and go to all appointments, and call your doctor if you are having problems. It's also a good idea to know your test results and keep a list of the medicines you take.  Current as of: October 24, 2023               Content Version: 14.0    9663-2989 [employer].   Care instructions adapted under license by your healthcare professional. If you have questions about a medical condition or this instruction, always ask your healthcare professional. [employer] disclaims any warranty or liability  for your use of this information.      Preventing Falls: Care Instructions  Injuries and health problems such as trouble walking or poor eyesight can increase your risk of falling. So can some medicines. But there are things you can do to help prevent falls. You can exercise to get stronger. You can also arrange your home to make it safer.    Talk to your doctor about the medicines you take. Ask if any of them increase the risk of falls and whether they can be changed or stopped.   Try to exercise regularly. It can help improve your strength and balance. This can help lower your risk of falling.     Practice fall safety and prevention.    Wear low-heeled shoes that fit well and give your feet good support. Talk to your doctor if you have foot problems that make this hard.  Carry a cellphone or wear a medical alert device that you can use to call for help.  Use stepladders instead of chairs to reach high objects. Don't climb if you're at risk for falls. Ask for help, if needed.  Wear the correct eyeglasses, if you need them.    Make your home safer.    Remove rugs, cords, clutter, and furniture from walkways.  Keep your house well lit. Use night-lights in hallways and bathrooms.  Install and use sturdy handrails on stairways.  Wear nonskid footwear, even inside. Don't walk barefoot or in socks without shoes.    Be safe outside.    Use handrails, curb cuts, and ramps whenever possible.  Keep your hands free by using a shoulder bag or backpack.  Try to walk in well-lit areas. Watch out for uneven ground, changes in pavement, and debris.  Be careful in the winter. Walk on the grass or gravel when sidewalks are slippery. Use de-icer on steps and walkways. Add non-slip devices to shoes.    Put grab bars and nonskid mats in your shower or tub and near the toilet. Try to use a shower chair or bath bench when bathing.   Get into a tub or shower by putting in your weaker leg first. Get out with your strong side first. Have a  "phone or medical alert device in the bathroom with you.   Where can you learn more?  Go to https://www.[employer].net/patiented  Enter G117 in the search box to learn more about \"Preventing Falls: Care Instructions.\"  Current as of: July 17, 2023               Content Version: 14.0    1570-8144 [employer].   Care instructions adapted under license by your healthcare professional. If you have questions about a medical condition or this instruction, always ask your healthcare professional. [employer] disclaims any warranty or liability for your use of this information.      Hearing Loss: Care Instructions  Overview     Hearing loss is a sudden or slow decrease in how well you hear. It can range from slight to profound. Permanent hearing loss can occur with aging. It also can happen when you are exposed long-term to loud noise. Examples include listening to loud music, riding motorcycles, or being around other loud machines.  Hearing loss can affect your work and home life. It can make you feel lonely or depressed. You may feel that you have lost your independence. But hearing aids and other devices can help you hear better and feel connected to others.  Follow-up care is a key part of your treatment and safety. Be sure to make and go to all appointments, and call your doctor if you are having problems. It's also a good idea to know your test results and keep a list of the medicines you take.  How can you care for yourself at home?  Avoid loud noises whenever possible. This helps keep your hearing from getting worse.  Always wear hearing protection around loud noises.  Wear a hearing aid as directed.  A professional can help you pick a hearing aid that will work best for you.  You can also get hearing aids over the counter for mild to moderate hearing loss.  Have hearing tests as your doctor suggests. They can show whether your hearing has changed. Your hearing aid may need to be " "adjusted.  Use other devices as needed. These may include:  Telephone amplifiers and hearing aids that can connect to a television, stereo, radio, or microphone.  Devices that use lights or vibrations. These alert you to the doorbell, a ringing telephone, or a baby monitor.  Television closed-captioning. This shows the words at the bottom of the screen. Most new TVs can do this.  TTY (text telephone). This lets you type messages back and forth on the telephone instead of talking or listening. These devices are also called TDD. When messages are typed on the keyboard, they are sent over the phone line to a receiving TTY. The message is shown on a monitor.  Use text messaging, social media, and email if it is hard for you to communicate by telephone.  Try to learn a listening technique called speechreading. It is not lipreading. You pay attention to people's gestures, expressions, posture, and tone of voice. These clues can help you understand what a person is saying. Face the person you are talking to, and have them face you. Make sure the lighting is good. You need to see the other person's face clearly.  Think about counseling if you need help to adjust to your hearing loss.  When should you call for help?  Watch closely for changes in your health, and be sure to contact your doctor if:    You think your hearing is getting worse.     You have new symptoms, such as dizziness or nausea.   Where can you learn more?  Go to https://www.[employer].net/patiented  Enter R798 in the search box to learn more about \"Hearing Loss: Care Instructions.\"  Current as of: September 27, 2023               Content Version: 14.0    7560-3180 [employer].   Care instructions adapted under license by your healthcare professional. If you have questions about a medical condition or this instruction, always ask your healthcare professional. [employer] disclaims any warranty or liability for your use of this " information.       Learning About Risk for Heart Attack and Stroke (01:56)  Your health professional recommends that you watch this short online health video.  Learn what raises your risk for having a heart attack or stroke and how you can lower your risk.  Purpose:  Explains risk factors for heart attack and stroke and ways to lower the risk.  Goal:  Users will understand what it means to be at risk for having a heart attack or stroke and what they can do to reduce their risk.     How to watch the video    Scan the QR code   OR Visit the website    https://i./r/Jhjpzhxhazcpy   Current as of: June 24, 2023               Content Version: 14.0    7041-4253 [employer].   Care instructions adapted under license by your healthcare professional. If you have questions about a medical condition or this instruction, always ask your healthcare professional. [employer] disclaims any warranty or liability for your use of this information.

## 2024-04-08 NOTE — PROGRESS NOTES
Preventive Care Visit  [facility] MYRNA Gomez       Apr 8, 2024    Assessment & Plan     Encounter for Medicare annual wellness exam      Sprain of interphalangeal joint of right middle finger, initial encounter  I recommended removing the patient's ring to help reduce the swelling more quickly.  It may take a few months for the swelling to completely resolve    Major depressive disorder, recurrent episode, moderate (H)  The current medical regimen is effective;  continue present plan and medications.      Visit for screening mammogram    - MA Screen Bilateral w/Duane; Future    Anxiety  The current medical regimen is effective;  continue present plan and medications.    - FLUoxetine (PROZAC) 40 MG capsule; TAKE 1 CAPSULE(40 MG) BY MOUTH DAILY.  - busPIRone (BUSPAR) 5 MG tablet; TAKE 2 TABLETS BY MOUTH EVERY MORNING AND 1 TABLET EVERY EVENING    Encounter for osteoporosis screening in asymptomatic postmenopausal patient    - DX Bone Density; Future    Bilateral sensorineural hearing loss    - Adult Audiology  Referral; Future    Lipid screening    - Lipid panel reflex to direct LDL Non-fasting; Future  - Lipid panel reflex to direct LDL Non-fasting    Screening for diabetes mellitus    - Basic metabolic panel  (Ca, Cl, CO2, Creat, Gluc, K, Na, BUN); Future  - Basic metabolic panel  (Ca, Cl, CO2, Creat, Gluc, K, Na, BUN)        30 minutes spent by me on the date of the encounter doing chart review, history and exam, documentation and further activities per the note      BMI  Estimated body mass index is 29.69 kg/m  as calculated from the following:    Height as of this encounter: 1.524 m (5').    Weight as of this encounter: 68.9 kg (152 lb).       Counseling  Appropriate preventive services were discussed with this patient, including applicable screening as appropriate for fall prevention, nutrition, physical activity, Tobacco-use cessation, weight loss and cognition.  Checklist  reviewing preventive services available has been given to the patient.  Reviewed patient's diet, addressing concerns and/or questions.   She is at risk for psychosocial distress and has been provided with information to reduce risk.   Patient reported safety concerns were addressed today.The patient was provided with written information regarding signs of hearing loss.   Information on urinary incontinence and treatment options given to patient.           Dawit Nicholson is a 64 year old, presenting for the following:  Physical        2024     1:06 PM   Additional Questions   Roomed by Millie MENDOZA   Accompanied by self         2024     1:06 PM   Patient Reported Additional Medications   Patient reports taking the following new medications none         Via the Health Maintenance questionnaire, the patient has reported the following services have been completed -Eye Exam, this information has been sent to the abstraction team.  Health Care Directive  Patient has a Health Care Directive on file  Advance care planning document is on file and is current.    HPI      -- Jammed her right ring finger on her car door - still having some pain/swelling with it     Depression and Anxiety   How are you doing with your depression since your last visit? No change  How are you doing with your anxiety since your last visit?  No change  Are you having other symptoms that might be associated with depression or anxiety? No  Have you had a significant life event? OTHER: Retired    Do you have any concerns with your use of alcohol or other drugs? No    Social History     Tobacco Use    Smoking status: Former     Packs/day: 2.00     Years: 12.00     Additional pack years: 0.00     Total pack years: 24.00     Types: Cigarettes     Start date: 1971     Quit date: 1984     Years since quittin.8     Passive exposure: Never    Smokeless tobacco: Never   Vaping Use    Vaping Use: Never used   Substance Use  Topics    Alcohol use: Yes     Comment: 3-5 per week    Drug use: No         2/21/2023     7:24 AM 3/16/2024     6:58 AM 4/8/2024     1:01 PM   PHQ   PHQ-9 Total Score 1 0    0 0   Q9: Thoughts of better off dead/self-harm past 2 weeks Not at all Not at all Not at all         2/21/2023     7:25 AM 3/16/2024     6:59 AM 4/6/2024     9:11 AM   LEONOR-7 SCORE   Total Score 1 (minimal anxiety) 1 (minimal anxiety) 1 (minimal anxiety)   Total Score 1 1    1 1         4/8/2024     1:01 PM   Last PHQ-9   1.  Little interest or pleasure in doing things 0   2.  Feeling down, depressed, or hopeless 0   3.  Trouble falling or staying asleep, or sleeping too much 0   4.  Feeling tired or having little energy 0   5.  Poor appetite or overeating 0   6.  Feeling bad about yourself 0   7.  Trouble concentrating 0   8.  Moving slowly or restless 0   Q9: Thoughts of better off dead/self-harm past 2 weeks 0   PHQ-9 Total Score 0         4/6/2024     9:11 AM   LEONOR-7    1. Feeling nervous, anxious, or on edge 1   2. Not being able to stop or control worrying 0   3. Worrying too much about different things 0   4. Trouble relaxing 0   5. Being so restless that it is hard to sit still 0   6. Becoming easily annoyed or irritable 0   7. Feeling afraid, as if something awful might happen 0   LEONOR-7 Total Score 1   If you checked any problems, how difficult have they made it for you to do your work, take care of things at home, or get along with other people? Not difficult at all       Suicide Assessment Five-step Evaluation and Treatment (SAFE-T)            4/6/2024   General Health   How would you rate your overall physical health? Excellent   Feel stress (tense, anxious, or unable to sleep) Only a little   (!) STRESS CONCERN      4/6/2024   Nutrition   Diet: Regular (no restrictions)         4/6/2024   Exercise   Days per week of moderate/strenous exercise 4 days   Average minutes spent exercising at this level 30 min         4/6/2024   Social  Factors   Frequency of gathering with friends or relatives Twice a week   Worry food won't last until get money to buy more No   Food not last or not have enough money for food? No   Do you have housing?  Yes   Are you worried about losing your housing? No   Lack of transportation? No   Unable to get utilities (heat,electricity)? No         2024   Fall Risk   Gait Speed Test (Document in seconds) 3.34          2024   Activities of Daily Living- Home Safety   Needs help with the following daily activites None of the above   Safety concerns in the home No grab bars in the bathroom         2024   Dental   Dentist two times every year? Yes         2024   Hearing Screening   Hearing concerns? (!) I FEEL THAT PEOPLE ARE MUMBLING OR NOT SPEAKING CLEARLY.    (!) I NEED TO ASK PEOPLE TO SPEAK UP OR REPEAT THEMSELVES.    (!) TROUBLE UNDERSTANDING SOFT OR WHISPERED SPEECH.         2024   Driving Risk Screening   Patient/family members have concerns about driving No         2024   General Alertness/Fatigue Screening   Have you been more tired than usual lately? No         2024   Urinary Incontinence Screening   Bothered by leaking urine in past 6 months Yes         2024   TB Screening   Were you born outside of the [place]? No       Today's PHQ-9 Score:       2024     1:01 PM   PHQ-9 SCORE   PHQ-9 Total Score MyChart 0   PHQ-9 Total Score 0         2024   Substance Use   Alcohol more than 3/day or more than 7/wk No   Do you have a current opioid prescription? No   How severe/bad is pain from 1 to 10? 1/10   Do you use any other substances recreationally? (!) ALCOHOL     Social History     Tobacco Use    Smoking status: Former     Packs/day: 2.00     Years: 12.00     Additional pack years: 0.00     Total pack years: 24.00     Types: Cigarettes     Start date: 1971     Quit date: 1984     Years since quittin.8     Passive exposure: Never    Smokeless tobacco: Never   Vaping Use     Vaping Use: Never used   Substance Use Topics    Alcohol use: Yes     Comment: 3-5 per week    Drug use: No           2/22/2022   LAST FHS-7 RESULTS   1st degree relative breast or ovarian cancer No   Any relative bilateral breast cancer No   Any male have breast cancer No   Any ONE woman have BOTH breast AND ovarian cancer No   Any woman with breast cancer before 50yrs No   2 or more relatives with breast AND/OR ovarian cancer No   2 or more relatives with breast AND/OR bowel cancer No        Mammogram Screening - Mammogram every 1-2 years updated in Health Maintenance based on mutual decision making    ASCVD Risk   The 10-year ASCVD risk score (Viridiana HADLEY, et al., 2019) is: 5%    Values used to calculate the score:      Age: 64 years      Sex: Female      Is Non- : No      Diabetic: No      Tobacco smoker: No      Systolic Blood Pressure: 122 mmHg      Is BP treated: No      HDL Cholesterol: 97 mg/dL      Total Cholesterol: 239 mg/dL            Reviewed and updated as needed this visit by Provider                      Current providers sharing in care for this patient include:  Patient Care Team:  Nona Torres DO as PCP - General (Family Medicine)  John Tran LICSW as Other (see comments) ( - Clinical)  Lexus Pandey MD as MD (Ophthalmology)  Nona Torres DO as Assigned PCP    The following health maintenance items are reviewed in Epic and correct as of today:  Health Maintenance   Topic Date Due    DEXA  Never done    EYE EXAM  07/28/2022    MEDICARE ANNUAL WELLNESS VISIT  02/21/2024    MAMMO SCREENING  02/22/2024    DTAP/TDAP/TD IMMUNIZATION (2 - Td or Tdap) 06/17/2024    PHQ-9  10/08/2024    LEONOR ASSESSMENT  04/08/2025    ANNUAL REVIEW OF HM ORDERS  04/08/2025    FALL RISK ASSESSMENT  04/08/2025    GLUCOSE  02/21/2026    LIPID  02/21/2028    ADVANCE CARE PLANNING  04/08/2029    COLORECTAL CANCER SCREENING  02/28/2032    HEPATITIS C SCREENING   Completed    DEPRESSION ACTION PLAN  Completed    INFLUENZA VACCINE  Completed    Pneumococcal Vaccine: 65+ Years  Completed    ZOSTER IMMUNIZATION  Completed    RSV VACCINE (Pregnancy & 60+)  Completed    COVID-19 Vaccine  Completed    IPV IMMUNIZATION  Aged Out    HPV IMMUNIZATION  Aged Out    MENINGITIS IMMUNIZATION  Aged Out    RSV MONOCLONAL ANTIBODY  Aged Out    PAP  Discontinued         Review of Systems  Constitutional, HEENT, cardiovascular, pulmonary, gi and gu systems are negative, except as otherwise noted.     Objective    Exam  /80 (BP Location: Right arm, Patient Position: Sitting, Cuff Size: Adult Regular)   Pulse 83   Temp 97.5  F (36.4  C) (Oral)   Resp 16   Ht 1.524 m (5')   Wt 68.9 kg (152 lb)   LMP  (LMP Unknown)   SpO2 97%   BMI 29.69 kg/m     Estimated body mass index is 29.69 kg/m  as calculated from the following:    Height as of this encounter: 1.524 m (5').    Weight as of this encounter: 68.9 kg (152 lb).    Physical Exam  GENERAL: alert and no distress  EYES: Eyes grossly normal to inspection, PERRL and conjunctivae and sclerae normal  HENT: ear canals and TM's normal, nose and mouth without ulcers or lesions  NECK: no adenopathy, no asymmetry, masses, or scars  RESP: lungs clear to auscultation - no rales, rhonchi or wheezes  CV: regular rate and rhythm, normal S1 S2, no S3 or S4, no murmur, click or rub, no peripheral edema  ABDOMEN: soft, nontender, no hepatosplenomegaly, no masses and bowel sounds normal  MS: tenderness to palpation tender right PIP joint of the fourth finger  SKIN: no suspicious lesions or rashes  NEURO: Normal strength and tone, mentation intact and speech normal  PSYCH: mentation appears normal, affect normal/bright         4/8/2024   Mini Cog   Clock Draw Score 2 Normal   3 Item Recall 3 objects recalled   Mini Cog Total Score 5             4/8/2024   Vision Screen   Patient wears corrective lenses (select all that apply) Worn during vision screen    Vision Screen Results Pass   VISION   Wears glasses: worn for testing  Tool used: Decker   Right eye:        10/12.5 (20/25)  Left eye:          10/12.5 (20/25)  Visual Acuity: Pass        Signed Electronically by: Nona Torres DO

## 2024-04-09 LAB
ANION GAP SERPL CALCULATED.3IONS-SCNC: 11 MMOL/L (ref 7–15)
BUN SERPL-MCNC: 16.4 MG/DL (ref 8–23)
CALCIUM SERPL-MCNC: 9.6 MG/DL (ref 8.8–10.2)
CHLORIDE SERPL-SCNC: 102 MMOL/L (ref 98–107)
CHOLEST SERPL-MCNC: 274 MG/DL
CREAT SERPL-MCNC: 0.99 MG/DL (ref 0.51–0.95)
DEPRECATED HCO3 PLAS-SCNC: 27 MMOL/L (ref 22–29)
EGFRCR SERPLBLD CKD-EPI 2021: 63 ML/MIN/1.73M2
FASTING STATUS PATIENT QL REPORTED: NO
GLUCOSE SERPL-MCNC: 106 MG/DL (ref 70–99)
HDLC SERPL-MCNC: 110 MG/DL
LDLC SERPL CALC-MCNC: 118 MG/DL
NONHDLC SERPL-MCNC: 164 MG/DL
POTASSIUM SERPL-SCNC: 4.5 MMOL/L (ref 3.4–5.3)
SODIUM SERPL-SCNC: 140 MMOL/L (ref 135–145)
TRIGL SERPL-MCNC: 232 MG/DL

## 2024-04-11 ENCOUNTER — MYC MEDICAL ADVICE (OUTPATIENT)
Dept: FAMILY MEDICINE | Facility: CLINIC | Age: 64
End: 2024-04-11
Payer: COMMERCIAL

## 2024-04-11 NOTE — TELEPHONE ENCOUNTER
RN replied to patient via myContactCardhart. See message for details.     James Macias RN, BSN, PHN  [facility]: [place]

## 2024-04-18 NOTE — PROGRESS NOTES
AUDIOLOGY REPORT    SUBJECTIVE:  Lyn Santana is a 64 year old female who was seen in the Audiology Clinic at the [facility] for audiologic evaluation, referred by Nona Torres D.O.  They were accompanied today by their self. The patient reports her  has encouraged her to get her hearing tested. States left ear may hear better than the right ear. Loud noise exposure from music/concerts in teens and 20's. Her mother and brother had/have hearing loss. Has occasional imbalance, denied vertigo. She denied otalgia, otorrhea, aural fullness, tinnitus, history of ear surgery, and previous hearing aid use.     OBJECTIVE:  Abuse Screening:  Do you feel unsafe at home or work/school? No  Do you feel threatened by someone? No  Does anyone try to keep you from having contact with others, or doing things outside of your home? No  Physical signs of abuse present? No     Fall Risk Screen:  1. Have you fallen two or more times in the past year? Yes on ice and water  2. Have you fallen and had an injury in the past year? Yes    Has already spoken with PCP about the falls.     Otoscopic exam indicates ears are clear of cerumen bilaterally     Pure Tone Thresholds assessed using conventional audiometry with good reliability from 250-8000 Hz bilaterally using insert earphones and circumaural headphones.     RIGHT:  Normal hearing sloping to a mild presumably sensorineural hearing loss at 8000 Hz    LEFT:  Normal hearing sloping to a mild to moderate presumably sensorineural hearing loss from 0107-1409 Hz.     Tympanogram:    RIGHT: Slight negative pressure    LEFT:  Normal eardrum mobility     Reflexes (reported by stimulus ear):  Did not test due to equipment limitations    Speech Reception Threshold:    RIGHT: 15 dB HL    LEFT:   15 dB HL    Word Recognition Score:     RIGHT: 100% at 60 dB HL using NU-6 recorded word list.    LEFT:   100% at 60 dB HL using NU-6 recorded word list.      ASSESSMENT:    Today's results reveal normal hearing sloping to a mild presumably sensorineural hearing loss at 8000 Hz in the right ear, and normal hearing sloping to a mild to moderate presumably sensorineural hearing loss from 2137-3932 Hz in the left ear. Today s results were discussed with the patient in detail.     PLAN:  Repeat hearing test in 1-2 years, sooner if new concerns arise. Please call this clinic with questions regarding these results or recommendations.    Starr Gallardo., CCC-A  [place] Licensed Audiologist #[id number]

## 2024-04-19 ENCOUNTER — OFFICE VISIT (OUTPATIENT)
Dept: AUDIOLOGY | Facility: CLINIC | Age: 64
End: 2024-04-19
Payer: COMMERCIAL

## 2024-04-19 DIAGNOSIS — H90.3 BILATERAL SENSORINEURAL HEARING LOSS: ICD-10-CM

## 2024-04-19 PROCEDURE — 92567 TYMPANOMETRY: CPT | Performed by: AUDIOLOGIST

## 2024-04-19 PROCEDURE — 92557 COMPREHENSIVE HEARING TEST: CPT | Performed by: AUDIOLOGIST

## 2024-06-15 ENCOUNTER — HEALTH MAINTENANCE LETTER (OUTPATIENT)
Age: 64
End: 2024-06-15

## 2024-06-21 ENCOUNTER — ANCILLARY PROCEDURE (OUTPATIENT)
Dept: MAMMOGRAPHY | Facility: CLINIC | Age: 64
End: 2024-06-21
Attending: FAMILY MEDICINE
Payer: COMMERCIAL

## 2024-06-21 DIAGNOSIS — Z12.31 VISIT FOR SCREENING MAMMOGRAM: ICD-10-CM

## 2024-06-21 PROCEDURE — 77067 SCR MAMMO BI INCL CAD: CPT | Mod: GC | Performed by: STUDENT IN AN ORGANIZED HEALTH CARE EDUCATION/TRAINING PROGRAM

## 2024-06-21 PROCEDURE — 77063 BREAST TOMOSYNTHESIS BI: CPT | Mod: GC | Performed by: STUDENT IN AN ORGANIZED HEALTH CARE EDUCATION/TRAINING PROGRAM

## 2024-06-25 ENCOUNTER — APPOINTMENT (OUTPATIENT)
Dept: CT IMAGING | Facility: CLINIC | Age: 64
End: 2024-06-25
Attending: EMERGENCY MEDICINE
Payer: COMMERCIAL

## 2024-06-25 ENCOUNTER — OFFICE VISIT (OUTPATIENT)
Dept: URGENT CARE | Facility: URGENT CARE | Age: 64
End: 2024-06-25
Payer: COMMERCIAL

## 2024-06-25 ENCOUNTER — HOSPITAL ENCOUNTER (EMERGENCY)
Facility: CLINIC | Age: 64
Discharge: HOME | End: 2024-06-25
Attending: EMERGENCY MEDICINE | Admitting: EMERGENCY MEDICINE
Payer: COMMERCIAL

## 2024-06-25 VITALS
OXYGEN SATURATION: 99 % | BODY MASS INDEX: 28.47 KG/M2 | HEART RATE: 76 BPM | WEIGHT: 145 LBS | TEMPERATURE: 98.4 F | SYSTOLIC BLOOD PRESSURE: 135 MMHG | HEIGHT: 60 IN | DIASTOLIC BLOOD PRESSURE: 93 MMHG | RESPIRATION RATE: 16 BRPM

## 2024-06-25 VITALS
TEMPERATURE: 97.4 F | OXYGEN SATURATION: 99 % | SYSTOLIC BLOOD PRESSURE: 157 MMHG | RESPIRATION RATE: 18 BRPM | DIASTOLIC BLOOD PRESSURE: 83 MMHG | HEART RATE: 73 BPM

## 2024-06-25 DIAGNOSIS — S00.83XA FACIAL CONTUSION, INITIAL ENCOUNTER: ICD-10-CM
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
## 2024-06-25 DIAGNOSIS — S01.81XA LACERATION OF FOREHEAD, INITIAL ENCOUNTER: Primary | ICD-10-CM

## 2024-06-25 DIAGNOSIS — S01.81XA FACIAL LACERATION, INITIAL ENCOUNTER: ICD-10-CM

## 2024-06-25 DIAGNOSIS — S09.90XA INJURY OF HEAD, INITIAL ENCOUNTER: ICD-10-CM

## 2024-06-25 PROCEDURE — 70450 CT HEAD/BRAIN W/O DYE: CPT | Mod: 26 | Performed by: RADIOLOGY

## 2024-06-25 PROCEDURE — 70486 CT MAXILLOFACIAL W/O DYE: CPT

## 2024-06-25 PROCEDURE — 90715 TDAP VACCINE 7 YRS/> IM: CPT | Mod: JZ | Performed by: EMERGENCY MEDICINE

## 2024-06-25 PROCEDURE — 99214 OFFICE O/P EST MOD 30 MIN: CPT | Performed by: NURSE PRACTITIONER

## 2024-06-25 PROCEDURE — 72125 CT NECK SPINE W/O DYE: CPT

## 2024-06-25 PROCEDURE — 70486 CT MAXILLOFACIAL W/O DYE: CPT | Mod: 26 | Performed by: RADIOLOGY

## 2024-06-25 PROCEDURE — 90471 IMMUNIZATION ADMIN: CPT | Performed by: EMERGENCY MEDICINE

## 2024-06-25 PROCEDURE — 99284 EMERGENCY DEPT VISIT MOD MDM: CPT | Performed by: EMERGENCY MEDICINE

## 2024-06-25 PROCEDURE — 250N000011 HC RX IP 250 OP 636: Mod: JZ | Performed by: EMERGENCY MEDICINE

## 2024-06-25 PROCEDURE — 70450 CT HEAD/BRAIN W/O DYE: CPT

## 2024-06-25 PROCEDURE — 72125 CT NECK SPINE W/O DYE: CPT | Mod: 26 | Performed by: RADIOLOGY

## 2024-06-25 PROCEDURE — 99284 EMERGENCY DEPT VISIT MOD MDM: CPT | Mod: 25 | Performed by: EMERGENCY MEDICINE

## 2024-06-25 RX ORDER — LIDOCAINE HYDROCHLORIDE 10 MG/ML
INJECTION, SOLUTION EPIDURAL; INFILTRATION; INTRACAUDAL; PERINEURAL
Status: DISCONTINUED
Start: 2024-06-25 | End: 2024-06-25 | Stop reason: HOSPADM

## 2024-06-25 RX ADMIN — CLOSTRIDIUM TETANI TOXOID ANTIGEN (FORMALDEHYDE INACTIVATED), CORYNEBACTERIUM DIPHTHERIAE TOXOID ANTIGEN (FORMALDEHYDE INACTIVATED), BORDETELLA PERTUSSIS TOXOID ANTIGEN (GLUTARALDEHYDE INACTIVATED), BORDETELLA PERTUSSIS FILAMENTOUS HEMAGGLUTININ ANTIGEN (FORMALDEHYDE INACTIVATED), BORDETELLA PERTUSSIS PERTACTIN ANTIGEN, AND BORDETELLA PERTUSSIS FIMBRIAE 2/3 ANTIGEN 0.5 ML: 5; 2; 2.5; 5; 3; 5 INJECTION, SUSPENSION INTRAMUSCULAR at 17:18

## 2024-06-25 ASSESSMENT — ACTIVITIES OF DAILY LIVING (ADL)
ADLS_ACUITY_SCORE: 35
ADLS_ACUITY_SCORE: 33
ADLS_ACUITY_SCORE: 33
ADLS_ACUITY_SCORE: 35

## 2024-06-25 ASSESSMENT — PAIN SCALES - GENERAL: PAINLEVEL: MODERATE PAIN (4)

## 2024-06-25 ASSESSMENT — COLUMBIA-SUICIDE SEVERITY RATING SCALE - C-SSRS
6. HAVE YOU EVER DONE ANYTHING, STARTED TO DO ANYTHING, OR PREPARED TO DO ANYTHING TO END YOUR LIFE?: NO
2. HAVE YOU ACTUALLY HAD ANY THOUGHTS OF KILLING YOURSELF IN THE PAST MONTH?: NO
1. IN THE PAST MONTH, HAVE YOU WISHED YOU WERE DEAD OR WISHED YOU COULD GO TO SLEEP AND NOT WAKE UP?: NO

## 2024-06-25 NOTE — ED TRIAGE NOTES
Arrives ambulatory after a fall. Per patient she tripped and hit her head on a step. Has a laceration to the forehead. C collar applied in triage. Denies LOC.     Triage Assessment (Adult)       Row Name 06/25/24 1311          Triage Assessment    Airway WDL WDL        Respiratory WDL    Respiratory WDL WDL        Skin Circulation/Temperature WDL    Skin Circulation/Temperature WDL WDL        Cardiac WDL    Cardiac WDL WDL        Peripheral/Neurovascular WDL    Peripheral Neurovascular WDL WDL        Cognitive/Neuro/Behavioral WDL    Cognitive/Neuro/Behavioral WDL WDL

## 2024-06-25 NOTE — ED PROVIDER NOTES
[facility]ERGENCY DE[facility]MENT ([facility])    6/25/24       ED PROVIDER NOTE     History     Chief Complaint   Patient presents with    Head Injury     The history is provided by the patient and medical records.     Lyn Santana is a 64 year old female with history of depression and anxiety (on medications) who presents to the Emergency Department after suffering mechanical fall with head injury. She was walking from her garden to the garage to get shovel tripped on a step and struck forehead against concrete. No loss of consciousness with this. This fall occurred at 11:15 AM. She yelled for her  soon after and he came to her aid.  She was brought to urgent care where she was noted to have a complex laceration to the forehead along with nausea and dizziness.  She was sent patient to the Emergency Department for higher level of cares including advanced imaging, complex laceration repair that they are unable to do out of urgent care.  Here patient continues to have pain to her forehead and neck. She also bumped her knee against something but no scrapes or cuts, not concerned by this. She notes having a few episodes of nausea today especially with driving from [place] to [place] to [place], but no episodes of vomiting. No epistaxis. Not on anticoagulation.     Per [facility] records last Tdap 2014.     Past Medical History  Past Medical History:   Diagnosis Date    Depressive disorder Dec. 2013    Have been taking an antidepressant since Jan. 2015; OK now     Past Surgical History:   Procedure Laterality Date    COLONOSCOPY N/A 2/28/2022    Procedure: COLONOSCOPY;  Surgeon: Paulo Sanchez MD;  Location:  GI    COLONOSCOPY N/A 2/28/2022    Procedure: COLONOSCOPY, WITH POLYPECTOMY AND BIOPSY;  Surgeon: Paulo Sanchez MD;  Location:  GI    SURGICAL HISTORY OF -       cryotherapy for abnormal pap(many years ago)     busPIRone (BUSPAR) 5 MG tablet  FLUoxetine (PROZAC) 40 MG  capsule  Multiple Vitamin (MULTIVITAMINS PO)      No Known Allergies  Family History  Family History   Problem Relation Age of Onset    Cancer Mother         blood cancer    Alcohol/Drug Mother         alcoholic    Substance Abuse Mother     Other Cancer Mother     Obesity Mother     C.A.D. Father     Hypertension Father     Psychotic Disorder Father         Bipolar    Bipolar Disorder Father     Depression Father     Obesity Father     Cancer Maternal Grandmother         brain cancer    Other Cancer Maternal Grandmother     Cancer Maternal Grandfather         mouth or throat cancer    Heart Disease Paternal Grandfather         Heart attack  from this in 60's    Hypertension Brother     Cardiovascular Brother     Depression Brother     Diabetes Brother     Obesity Brother     Alcohol/Drug Brother         drug abuse    Suicide Paternal Aunt     Depression Paternal Aunt     Depression Paternal Half-Sister     Depression Niece     Substance Abuse Paternal Half-Brother     Glaucoma No family hx of     Macular Degeneration No family hx of      Social History   Social History     Tobacco Use    Smoking status: Former     Current packs/day: 0.00     Average packs/day: 2.0 packs/day for 13.1 years (26.1 ttl pk-yrs)     Types: Cigarettes     Start date: 1971     Quit date: 1984     Years since quittin.0     Passive exposure: Never    Smokeless tobacco: Never   Vaping Use    Vaping status: Never Used   Substance Use Topics    Alcohol use: Yes     Comment: 3-5 per week    Drug use: No      A medically appropriate review of systems was performed with pertinent positives and negatives noted in the HPI, and all other systems negative.    Physical Exam   BP: (!) 157/83  Pulse: 73  Temp: 97.4  F (36.3  C)  Resp: 18  SpO2: 99 %    Physical Exam  Constitutional:       General: She is not in acute distress.     Appearance: She is well-developed. She is not toxic-appearing.   HENT:      Head:      Comments: 5 and half  centimeter linear laceration on the mid forehead.  Mildly gaping.  Bruising noted on the bridge of the nose.  No septal hematoma.  Septum is well aligned.  No nosebleed.  Some bruising on the tip of the chin.  Some purpleish ecchymosis.  No laceration.  Neck:      Comments: Very minimal tenderness noticed on the C6 area of the cervical spine.  C-collar in place.  Cardiovascular:      Rate and Rhythm: Normal rate and regular rhythm.      Heart sounds: Normal heart sounds.   Pulmonary:      Effort: Pulmonary effort is normal. No respiratory distress.      Breath sounds: Normal breath sounds.   Abdominal:      General: There is no distension.      Palpations: Abdomen is soft.      Tenderness: There is no abdominal tenderness. There is no rebound.   Musculoskeletal:         General: No tenderness.      Cervical back: Normal range of motion and neck supple.      Comments: Normal range of motion of all 4 extremities.  Normal gait.   Skin:     General: Skin is warm and dry.   Neurological:      Mental Status: She is alert and oriented to person, place, and time.   Psychiatric:         Behavior: Behavior normal.         Thought Content: Thought content normal.           ED Course, Procedures, & Data      Procedures         Results for orders placed or performed during the hospital encounter of 06/25/24   Head CT w/o contrast     Status: None    Narrative    EXAM: CT HEAD W/O CONTRAST  6/25/2024 2:27 PM     HISTORY:  fall, head injury       COMPARISON:  None.    TECHNIQUE: Using multidetector thin collimation helical acquisition  technique, axial, coronal and sagittal CT images from the skull base  to the vertex were obtained without intravenous contrast.   (topogram) image(s) also obtained and reviewed.    FINDINGS:  No acute intracranial hemorrhage, mass effect, or midline shift. No  acute loss of gray-white matter differentiation in the cerebral  hemispheres. The ventricles are proportionate to the cerebral  sulci.  Clear basal cisterns. Mild periventricular subcortical white matter  hypoattenuation, likely pseudosubluxation given age.    The bony calvaria in the bones of the skull base are normal. The  visualized portions of the paranasal sinuses and mastoid air cells are  clear. Orbits are unremarkable. Small anterior frontal soft tissue  contusion with subcutaneous emphysema. Chronic nasal deformity.      Impression    IMPRESSION:  No acute intracranial pathology.    I have personally reviewed the examination and initial interpretation  and I agree with the findings.    DENIS GOLDMAN MD         SYSTEM ID:  [id number]   CT Facial Bones without Contrast     Status: None    Narrative    EXAM: CT FACIAL BONES WITHOUT CONTRAST  6/25/2024 2:28 PM     HISTORY:  facial pain       COMPARISON: Same day head CT      TECHNIQUE: Using thin collimation multidetector helical acquisition  technique, axial, sagittal, and coronal thin section CT images were  reconstructed through the facial bones. Images were reviewed in bone  and soft tissue windows.    FINDINGS:   No acute facial bone fracture. Intact cribriform plate. Orbital  structures are within normal limits.     No periapical dental lucency. Redemonstration of the anterior frontal  subcutaneous contusion with subcutaneous emphysema. Trace bilateral  maxillary sinus mucosal thickening. Chronic nasal deformity.      Impression    IMPRESSION: Anterior frontal scalp abrasion and laceration. No facial  bone fracture.     I have personally reviewed the examination and initial interpretation  and I agree with the findings.    OMAR BLOOM MD         SYSTEM ID:  [id number]   CT Cervical Spine w/o Contrast     Status: None    Narrative    EXAM: CT CERVICAL SPINE W/O CONTRAST  6/25/2024 2:27 PM     HISTORY:  fall       COMPARISON:  None available.    TECHNIQUE: Using multidetector thin collimation helical acquisition  technique, axial, coronal and sagittal CT images through the  cervical  spine were obtained without intravenous contrast.    FINDINGS:  Normal vertebral body alignment. No acute fracture or traumatic  subluxation. Minimal degenerative anterolisthesis at C4-5 and C7-T1.  Moderate loss of disc height at C5-6. Mild loss of disc height at  C6-7. Additional multilevel degenerative changes including endplate  osteophytosis, uncinate hypertrophy, and facet hypertrophy. No  high-grade spinal canal stenosis. Moderate to severe neural foraminal  stenosis on the left at C5-6 and on the right at C6-7. Additional  multilevel mild neural foraminal narrowing. No prevertebral edema.     No abnormality of the paraspinous soft tissues.      Impression    IMPRESSION:  1. No acute fracture or traumatic subluxation.  2. Multilevel cervical degenerative changes, greatest at C5-6 and  C6-7.    OMAR BLOOM MD         SYSTEM ID:  [id number]     Medications   Tdap (tetanus-diphtheria-acell pertussis) (ADACEL) injection 0.5 mL (0.5 mLs Intramuscular $Given 6/25/24 4494)     [facility] Procedure Note        Laceration Repair:    Performed by: Cheryl Cobian MD  Authorized by: Cheryl Cobian MD  Consent given by: Patient who states understanding of the procedure being performed after discussing the risks, benefits and alternatives.    Preparation: Patient was prepped and draped in usual sterile fashion.  Irrigation solution: saline    Body area:face  Laceration length: 7cm  Contamination: The wound is not contaminated.  Foreign bodies:none  Tendon involvement: none  Anesthesia: Local  Local anesthetic: Lidocaine     1%  Anesthetic total: 7ml    Debridement: none  Skin closure: Closed with 7 x 5.0 Ethilon and with 3 x SQ 5.0 Vicryl Sutures  Technique: interrupted  Approximation: close  Approximation difficulty: intermediate (layered closure or heavily contaminated)    Patient tolerance: Patient tolerated the procedure well with no immediate complications.         No results found  for any visits on 06/25/24.  Medications - No data to display  Labs Ordered and Resulted from Time of ED Arrival to Time of ED Departure - No data to display  Head CT w/o contrast    (Results Pending)   CT Spine Complete w/o Contrast    (Results Pending)   CT Facial Bones without Contrast    (Results Pending)          Critical care was not performed.     Medical Decision Making  The patient's presentation was of high complexity (an acute health issue posing potential threat to life or bodily function).    The patient's evaluation involved:  review of external note(s) from 3+ sources (see separate area of note for details)  review of 3+ test result(s) ordered prior to this encounter (see separate area of note for details)  ordering and/or review of 3+ test(s) in this encounter (see separate area of note for details)        The patient's management necessitated moderate risk (a decision regarding minor procedure (laceration repair) with risk factors of none).    Assessment & Plan    Patient is a 64-year-old female who presented to the ER due to mechanical fall.  We obtained a CT of her head face and neck that are all normal.  She did have a large laceration of her forehead that was repaired.  Patient's tetanus was updated.  Patient tolerated the procedure well.  Patient stable for discharge.  I did review discharge instructions with the patient.    I have reviewed the nursing notes. I have reviewed the findings, diagnosis, plan and need for follow up with the patient.    New Prescriptions    No medications on file       Final diagnoses:   None     Alannah BRYAN, am serving as a trained medical scribe to document services personally performed by Cheryl Cobian MD based on the provider's statements to me on June 25, 2024.  This document has been checked and approved by the attending provider.    ICheryl MD, was physically present and have reviewed and verified the accuracy of this note  documented by Alannah Bates, medical scribe.      Cheryl Cobian MD   [facility] EMERGENCY DEPARTMENT  6/25/2024        Cheryl Cobian MD  06/25/24 [id number]

## 2024-06-25 NOTE — PROGRESS NOTES
Chief Complaint   Patient presents with    Fall     Fell and hit head on ground  around 11:30 am today     Head Injury     Laceration on forehead      SUBJECTIVE:  Lyn Santana is a 64 year old female who presents to the clinic today with a severe forehead laceration that occurred about an hour ago after falling and hitting head on concrete. She is nauseous and dizzy. Tdap last done 14.  She is not on anticoagulation.  Did not lose consciousness, no vomiting or orbital tenderness.    Past Medical History:   Diagnosis Date    Depressive disorder Dec. 2013    Have been taking an antidepressant since 2015; OK now     Current Outpatient Medications   Medication Sig Dispense Refill    busPIRone (BUSPAR) 5 MG tablet TAKE 2 TABLETS BY MOUTH EVERY MORNING AND 1 TABLET EVERY EVENING 270 tablet 3    FLUoxetine (PROZAC) 40 MG capsule TAKE 1 CAPSULE(40 MG) BY MOUTH DAILY. 90 capsule 3    Multiple Vitamin (MULTIVITAMINS PO) Take by mouth daily (Patient not taking: Reported on 2024)       No current facility-administered medications for this visit.     Social History     Tobacco Use    Smoking status: Former     Current packs/day: 0.00     Average packs/day: 2.0 packs/day for 13.1 years (26.1 ttl pk-yrs)     Types: Cigarettes     Start date: 1971     Quit date: 1984     Years since quittin.0     Passive exposure: Never    Smokeless tobacco: Never   Substance Use Topics    Alcohol use: Yes     Comment: 3-5 per week     No Known Allergies    Review of Systems  All systems negative except for those listed above in HPI.    EXAM:   BP (!) 135/93   Pulse 76   Temp 98.4  F (36.9  C) (Temporal)   Resp 16   Ht 1.524 m (5')   Wt 65.8 kg (145 lb)   LMP  (LMP Unknown)   SpO2 99%   BMI 28.32 kg/m      Physical Exam  Vitals reviewed.   Constitutional:       Appearance: Normal appearance.   Cardiovascular:      Rate and Rhythm: Normal rate.      Pulses: Normal pulses.   Pulmonary:      Effort: Pulmonary  effort is normal.   Musculoskeletal:         General: Normal range of motion.   Skin:     General: Skin is warm and dry.      Findings: Bruising and erythema (forehead laceration gaping heavy bleeding) present. No rash.   Neurological:      General: No focal deficit present.      Mental Status: She is alert and oriented to person, place, and time.      Motor: Weakness present.      Gait: Gait abnormal.   Psychiatric:         Mood and Affect: Mood normal.         Behavior: Behavior normal.       ASSESSMENT:    ICD-10-CM    1. Laceration of forehead, initial encounter  S01.81XA       2. Fall on concrete  W19.XXXA         PLAN:    Triaged to the ER for higher level of care given severe forehead laceration with nausea dizziness that occurred about an hour ago hitting face on concrete  Urgent care limited without advanced imaging such as CT MRI vs observation in regards to head trauma  Tdap last done 06/17/14  Patient will go to local ER now by car with     Follow up with primary care provider with any problems, questions or concerns or if symptoms worsen or fail to improve. Patient agreed to plan and verbalized understanding.    Brenda De Leon, CHARLES-BC  [facility] [place]

## 2024-06-25 NOTE — PATIENT INSTRUCTIONS
Triaged to the ER for higher level of care given severe scalp laceration with nausea dizziness that occurred about an hour ago hitting forehead on concrete  Urgent care limited without advanced imaging such as CT MRI vs observation in regards to head trauma  Tdap last done 06/17/14  Patient will go to local ER now by car with

## 2024-06-25 NOTE — DISCHARGE INSTRUCTIONS
You had 7 outside stitches and 3 internal stitches placed.     The outside stitches need to be removed in 5-6 days.     Your CT head, face, and neck are normal.     Please follow for the stitches removal.     Take ibuprofen and tylenol for pain and swelling.

## 2024-06-26 ENCOUNTER — MYC MEDICAL ADVICE (OUTPATIENT)
Dept: FAMILY MEDICINE | Facility: CLINIC | Age: 64
End: 2024-06-26
Payer: COMMERCIAL

## 2024-06-26 ENCOUNTER — PATIENT OUTREACH (OUTPATIENT)
Dept: FAMILY MEDICINE | Facility: CLINIC | Age: 64
End: 2024-06-26
Payer: COMMERCIAL

## 2024-06-26 NOTE — TELEPHONE ENCOUNTER
RN replied to patient via [facility]. See message for details.     James Macias RN, BSN, PHN  [facility]: [place]

## 2024-06-26 NOTE — TELEPHONE ENCOUNTER
Transitions of Care Outreach  Chief Complaint   Patient presents with    Hospital F/U       Most Recent Admission Date: 6/25/2024   Most Recent Admission Diagnosis:      Most Recent Discharge Date: 6/25/2024   Most Recent Discharge Diagnosis: Facial laceration, initial encounter - S01.81XA  Injury of head, initial encounter - S09.90XA  Facial contusion, initial encounter - S00.83XA     Transitions of Care Assessment    Discharge Assessment  How are you doing now that you are home?: doing well, has not looked at sutures under gauze  How are your symptoms? (Red Flag symptoms escalate to triage hotline per guidelines): Improved  Do you know how to contact your clinic care team if you have future questions or changes to your health status? : Yes  Does the patient have their discharge instructions? : Yes  Does the patient have questions regarding their discharge instructions? : Yes (see comment) (stuture care for showers)  Were you started on any new medications or were there changes to any of your previous medications? : No  Does the patient have all of their medications?: Yes  Do you have questions regarding any of your medications? : No  Do you have all of your needed medical supplies or equipment (DME)?  (i.e. oxygen tank, CPAP, cane, etc.): Yes    Follow up Plan          Future Appointments   Date Time Provider Department Center   7/2/2024  9:00 AM Bravo Snyder DO [facility]   5/8/2025  9:00 AM Nona Torres DO [facility]       Outpatient Plan as outlined on AVS reviewed with patient.    For any urgent concerns, please contact our 24 hour nurse triage line: [phone] ([phone])       Christine M Klisch, RN

## 2024-06-26 NOTE — TELEPHONE ENCOUNTER
Patient in ER 6/25    Diagnosis    Facial laceration, initial encounter    Injury of head, initial encounter    Facial contusion, initial encounter    Recommended follow up:    You had 7 outside stitches and 3 internal stitches placed.  The outside stitches need to be removed in 5-6 days.  Your CT head, face, and neck are normal.  Please follow for the stitches removal.  Take ibuprofen and tylenol for pain and swelling      James Macias RN, BSN, PHN  [facility]

## 2024-07-02 ENCOUNTER — OFFICE VISIT (OUTPATIENT)
Dept: FAMILY MEDICINE | Facility: CLINIC | Age: 64
End: 2024-07-02
Payer: COMMERCIAL

## 2024-07-02 VITALS
SYSTOLIC BLOOD PRESSURE: 130 MMHG | WEIGHT: 146 LBS | RESPIRATION RATE: 16 BRPM | OXYGEN SATURATION: 97 % | DIASTOLIC BLOOD PRESSURE: 70 MMHG | HEIGHT: 60 IN | BODY MASS INDEX: 28.66 KG/M2 | HEART RATE: 86 BPM | TEMPERATURE: 98 F

## 2024-07-02 DIAGNOSIS — S01.81XD FACIAL LACERATION, SUBSEQUENT ENCOUNTER: ICD-10-CM

## 2024-07-02 DIAGNOSIS — Z09 HOSPITAL DISCHARGE FOLLOW-UP: Primary | ICD-10-CM

## 2024-07-02 DIAGNOSIS — S09.90XD INJURY OF HEAD, SUBSEQUENT ENCOUNTER: ICD-10-CM
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
## 2024-07-02 PROCEDURE — 99213 OFFICE O/P EST LOW 20 MIN: CPT | Performed by: FAMILY MEDICINE

## 2024-07-02 NOTE — PROGRESS NOTES
{PROVIDER CHARTING PREFERENCE:664452}    Dawit Nicholson is a 64 year old, presenting for the following health issues:  No chief complaint on file.      7/2/2024     8:18 AM   Additional Questions   Roomed by paty     Via the Health Maintenance questionnaire, the patient has reported the following services have been completed -Eye Exam: [facility] 2023-10-15, this information has been sent to the abstraction team.  HPI     {MA/LPN/RN Pre-Provider Visit Orders- hCG/UA/Strep (Optional):773305}  ED/UC Followup:    Facility:  [facility]  Date of visit: 6/25/14  Reason for visit: Facial laceration  Current Status: stitch removal today   {additonal problems for provider to add (Optional):990675}    {ROS Picklists (Optional):931933}      Objective    LMP  (LMP Unknown)   There is no height or weight on file to calculate BMI.  Physical Exam   {Exam List (Optional):338145}    {Diagnostic Test Results (Optional):694279}        Signed Electronically by: Bravo Snyder DO  {Email feedback regarding this note to [email or web address]   :426352}

## 2024-07-02 NOTE — PROGRESS NOTES
ICD-10-CM    1. Hospital discharge follow-up  Z09       2. Fall, subsequent encounter  W19.XXXD       3. Injury of head, subsequent encounter  S09.90XD       4. Facial laceration, subsequent encounter  S01.81XD         New to this provider here for hospital follow-up.  She was seen on 625 after she tripped and fell and struck her forehead against a concrete and an open laceration.  She denied any loss of consciousness.  She did have 7 stitches placed and was advised to follow-up in clinic to have it removed.  She denies any concussion-like symptoms no headaches no dizziness nausea vomiting.  No neurological findings.  Reviewed epic she did have a negative CT scan.  She denies any new injury since her last visit.  Discussed close monitoring for concussion-like symptoms and signs of head bleed.  She is to follow-up if she has any new symptoms or neurological issues.  7 stitches removed without complication today.  Follow-up routine care with PCP otherwise    Subjective   Lyn is a 64 year old, presenting for the following health issues:  ER F/U and Suture Removal      7/2/2024     8:18 AM   Additional Questions   Roomed by paty     Via the [employer] Maintenance questionnaire, the patient has reported the following services have been completed -Eye Exam: [facility] clinic 2023-10-15, this information has been sent to the abstraction team.  HPI   Doing well after ed visit, no ongoing headache, no seizure like actvity, no neurological problems, no concussion problems    Review of Systems  Constitutional, HEENT, cardiovascular, pulmonary, GI, , musculoskeletal, neuro, skin, endocrine and psych systems are negative, except as otherwise noted.      Objective    /70   Pulse 86   Temp 98  F (36.7  C) (Oral)   Resp 16   Ht 1.524 m (5')   Wt 66.2 kg (146 lb)   LMP  (LMP Unknown)   SpO2 97%   BMI 28.51 kg/m    Body mass index is 28.51 kg/m .  Physical Exam   PatientGENERAL: alert and no  distress  NECK: no adenopathy, no asymmetry, masses, or scars  RESP: lungs clear to auscultation - no rales, rhonchi or wheezes  CV: regular rate and rhythm, normal S1 S2, no S3 or S4, no murmur, click or rub, no peripheral edema  MS: no gross musculoskeletal defects noted, no edema    7 stiches removed from  forehead, no redness, no drainage, healing very well.         Signed Electronically by: Bravo Snyder DO

## 2024-09-16 ENCOUNTER — TRANSFERRED RECORDS (OUTPATIENT)
Dept: HEALTH INFORMATION MANAGEMENT | Facility: CLINIC | Age: 64
End: 2024-09-16

## 2024-10-03 ENCOUNTER — MYC MEDICAL ADVICE (OUTPATIENT)
Dept: FAMILY MEDICINE | Facility: CLINIC | Age: 64
End: 2024-10-03
Payer: COMMERCIAL

## 2024-10-03 NOTE — TELEPHONE ENCOUNTER
RN replied to patient via [employer]. See message for details.     James Macias RN, BSN, PHN  [facility]: [place]

## 2024-10-14 ENCOUNTER — MYC MEDICAL ADVICE (OUTPATIENT)
Dept: FAMILY MEDICINE | Facility: CLINIC | Age: 64
End: 2024-10-14
Payer: COMMERCIAL

## 2024-10-14 DIAGNOSIS — Z78.0 ENCOUNTER FOR OSTEOPOROSIS SCREENING IN ASYMPTOMATIC POSTMENOPAUSAL PATIENT: Primary | ICD-10-CM

## 2024-10-14 DIAGNOSIS — Z13.820 ENCOUNTER FOR OSTEOPOROSIS SCREENING IN ASYMPTOMATIC POSTMENOPAUSAL PATIENT: Primary | ICD-10-CM

## 2024-10-14 NOTE — TELEPHONE ENCOUNTER
Routing to Dr Torres.  Patient is requesting to get a Dexa Scan.  Can you place an order for this?    KIMBERLI Wilkins  [facility]

## 2024-10-15 ENCOUNTER — PATIENT OUTREACH (OUTPATIENT)
Dept: CARE COORDINATION | Facility: CLINIC | Age: 64
End: 2024-10-15
Payer: COMMERCIAL

## 2024-10-17 ENCOUNTER — PATIENT OUTREACH (OUTPATIENT)
Dept: CARE COORDINATION | Facility: CLINIC | Age: 64
End: 2024-10-17
Payer: COMMERCIAL

## 2024-11-06 ENCOUNTER — OFFICE VISIT (OUTPATIENT)
Dept: FAMILY MEDICINE | Facility: CLINIC | Age: 64
End: 2024-11-06
Payer: COMMERCIAL

## 2024-11-06 VITALS
TEMPERATURE: 97.1 F | DIASTOLIC BLOOD PRESSURE: 77 MMHG | WEIGHT: 149 LBS | HEART RATE: 73 BPM | BODY MASS INDEX: 29.25 KG/M2 | HEIGHT: 60 IN | OXYGEN SATURATION: 97 % | SYSTOLIC BLOOD PRESSURE: 144 MMHG

## 2024-11-06 DIAGNOSIS — Z71.84 TRAVEL ADVICE ENCOUNTER: Primary | ICD-10-CM

## 2024-11-06 PROCEDURE — 90691 TYPHOID VACCINE IM: CPT | Mod: GA | Performed by: NURSE PRACTITIONER

## 2024-11-06 PROCEDURE — 90471 IMMUNIZATION ADMIN: CPT | Mod: GA | Performed by: NURSE PRACTITIONER

## 2024-11-06 PROCEDURE — 99401 PREV MED CNSL INDIV APPRX 15: CPT | Mod: 25 | Performed by: NURSE PRACTITIONER

## 2024-11-06 RX ORDER — AZITHROMYCIN 500 MG/1
500 TABLET, FILM COATED ORAL DAILY
Qty: 3 TABLET | Refills: 0 | Status: SHIPPED | OUTPATIENT
Start: 2024-11-06 | End: 2024-11-09

## 2024-11-06 RX ORDER — ATOVAQUONE AND PROGUANIL HYDROCHLORIDE 250; 100 MG/1; MG/1
1 TABLET, FILM COATED ORAL DAILY
Qty: 13 TABLET | Refills: 0 | Status: SHIPPED | OUTPATIENT
Start: 2024-11-06

## 2024-11-06 ASSESSMENT — PAIN SCALES - GENERAL: PAINLEVEL_OUTOF10: NO PAIN (0)

## 2024-11-06 NOTE — PROGRESS NOTES
Nurse Note ( Pre-Travel Consult)    Itinerary:  [facility]    Departure Date: 1/3/25    Return Date: 1/12/25    Length of Trip 11 days    Reason for Travel: Tourism         Urban or rural: both    Accommodations: Hotel        IMMUNIZATION HISTORY  Have you received any immunizations within the past 4 weeks?  No  Have you ever fainted from having your blood drawn or from an injection?  No  Have you ever had a fever reaction to vaccination?  No  Have you ever had any bad reaction or side effect from any vaccination?  No  Have you ever had hepatitis A or B vaccine?  Yes  Do you live (or work closely) with anyone who has AIDS, an AIDS-like condition, any other immune disorder or who is on chemotherapy for cancer?  No  Do you have a family history of immunodeficiency?  No  Have you received any injection of immune globulin or any blood products during the past 12 months?  No    Patient roomed by David Santana is a 64 year old female seen today with spouse for counsultation for international travel.   Patient will be departing in  3 month(s) and  traveling with spouse.      Patient itinerary :  will be in the [place]  ([facility] for 3 nights ) region of [facility] which risk for Malaria, Dengue Fever, food borne illnesses, and motor vehicle accidents. exposure.      Patient's activities will include sightseeing and bird watching .    Patient's country of birth is [place]    Special medical concerns: Anxiety/depression  Pre-travel questionnaire was completed by patient and reviewed by provider.     Vitals: BP (!) 144/77   Pulse 73   Temp 97.1  F (36.2  C) (Temporal)   Ht 1.524 m (5')   Wt 67.6 kg (149 lb)   LMP  (LMP Unknown)   SpO2 97%   BMI 29.10 kg/m    BMI= Body mass index is 29.1 kg/m .    EXAM:  General:  Well-nourished, well-developed in no acute distress.  Appears to be stated age, interacts appropriately and expresses understanding of information  given to patient.    Current Outpatient Medications   Medication Sig Dispense Refill    busPIRone (BUSPAR) 5 MG tablet TAKE 2 TABLETS BY MOUTH EVERY MORNING AND 1 TABLET EVERY EVENING 270 tablet 3    FLUoxetine (PROZAC) 40 MG capsule TAKE 1 CAPSULE(40 MG) BY MOUTH DAILY. 90 capsule 3    Multiple Vitamin (MULTIVITAMINS PO) Take by mouth daily       Patient Active Problem List   Diagnosis    Anxiety    Major depressive disorder, recurrent episode, moderate (H)     No Known Allergies      Immunizations discussed include:   Covid 19: Up to date  Hepatitis A:  Up to date  Hepatitis B: Up to date  Influenza: Up to date  Typhoid: Ordered/given today, risks, benefits and side effects reviewed  Rabies: Declined  reviewed managment of a animal bite or scratch (washing wound, seek medical care within 24 hours for post exposure prophylaxis )  Yellow Fever: Not indicated  Japanese Encephalitis: Not indicated  Meningococcus: Not indicated  Tetanus/Diphtheria: Up to date  Measles/Mumps/Rubella: Declined  vaccine or titers  Cholera: Not needed  Polio: Not indicated  Pneumococcal: Up to date  Varicella: Immune by disease history per patient report  Shingrix: Up to date  HPV:  Not indicated     TB: low risk    Altitude Exposure on this trip: no  Past tolerance to Altitude: na    ASSESSMENT/PLAN:  Lyn was seen today for travel clinic.    Diagnoses and all orders for this visit:    Travel advice encounter  -     atovaquone-proguanil (MALARONE) 250-100 MG tablet; Take 1 tablet by mouth daily. Start 2 days before exposure to Malaria and continue daily till  7 days after exposure.  -     azithromycin (ZITHROMAX) 500 MG tablet; Take 1 tablet (500 mg) by mouth daily for 3 doses. Take 1 tablet a day for up to 3 days for severe diarrhea    Other orders  -     TYPHOID VACCINE, IM      I have reviewed general recommendations for safe travel   including: food/water precautions, insect precautions, safer sex   practices given high prevalence  of Zika, HIV and other STDs,   roadway safety. Educational materials and [employer] report provided.    Malaraia prophylaxis recommended: Malarone  Symptomatic treatment for traveler's diarrhea: azithromycin        Evacuation insurance advised and resources were provided to patient.    Total visit time 22 minutes  with over 50% of time spent counseling patient and shared decision making as detailed above.    Daxa Villagomez CNP  Certificate in Travel Health

## 2024-11-06 NOTE — PATIENT INSTRUCTIONS
Thank you for visiting the [facility] : [phone]  Today November 6, 2024 you received the    Typhoid vaccine      Follow up vaccine appointments can be made as a NURSE ONLY visit at the Travel Clinic, (BE PREPARED TO WAIT, ) or at designated [facility].    If you are receiving the Rabies vaccines series, it is important that you follow the exact schedule ordered.     Pre-travel     We recommend that you purchase Medical Evacuation Insurance prior to your departure.  Https://wwwnc.cdc.gov/travel/page/insurance    [place] your travel plans with the [employer] Department of [employer] Your [employer] through STEP ( Smart Traveler Enrollment Program ) https://step.state.gov.  STEP is a free service to allow [place] citizens and nationals traveling and living abroad to enroll their trip with the nearest U[place]. Embassy or Consulate.    Animal Exposure: Avoid all mammals even if they look healthy.  If there is a bite, scratch or even a lick, wash area immediately with soap and water for 15 minutes and seek medical care within 24 hours for evaluation of Rabies post exposure treatment.  Contact your Medical Evacuation Insurance.    Repiratory illness prevention strategies ( Covid and Influenza ) CDC recommendations:  Consider wearing a mask in crowded or poorly ventilated indoor areas, including on public transportation and in transportation hubs.  Hand washing: frequent, thorough handwashing with soap and water for 20 seconds. Use an alcohol-based hand  with at least 60% alcohol if soap and water are not readily available. Avoid touching face, nose, eyes, mouth unless you have done appropriate hand washing as above.  VACCINES: Staying up to date on COVID-19 vaccines significantly lowers the risk of getting very sick, being hospitalized, or dying from COVID-19. CDC recommends that everyone stay up to date on their COVID-19 vaccines, especially people with weakened immune systems.    Travel Covid  19 Testing:  updated 12/06/2021  International travelers: Pre-travel:  See country specific Embassy websites or airline websites.    Post travel: CDC recommends getting tested 3-5 days after your trip     Post-travel illness:  Contact your provider or [facility] if you develop a fever, rash, cough, diarrhea or other symptoms for up to 1 year after travel.  Inform your healthcare provider when and where you traveled to.    Please call the [facility] with any questions [phone]  Or send your provider a 'My Chart' note.

## 2024-12-02 ENCOUNTER — ANCILLARY PROCEDURE (OUTPATIENT)
Dept: BONE DENSITY | Facility: CLINIC | Age: 64
End: 2024-12-02
Attending: FAMILY MEDICINE
Payer: COMMERCIAL

## 2024-12-02 DIAGNOSIS — Z78.0 ENCOUNTER FOR OSTEOPOROSIS SCREENING IN ASYMPTOMATIC POSTMENOPAUSAL PATIENT: ICD-10-CM

## 2024-12-02 DIAGNOSIS — Z13.820 ENCOUNTER FOR OSTEOPOROSIS SCREENING IN ASYMPTOMATIC POSTMENOPAUSAL PATIENT: ICD-10-CM

## 2024-12-02 PROCEDURE — 77080 DXA BONE DENSITY AXIAL: CPT | Performed by: INTERNAL MEDICINE

## 2024-12-09 NOTE — TELEPHONE ENCOUNTER
12/9/2024      RE: Dalton Kumar  [address]  [place] [address]       Dear Colleague,    Thank you for the opportunity to participate in the care of your patient, Dalton Kumar, at the [facility] [place] [facility]. Please see a copy of my visit note below.      [facility] - [facility]   Cardiology Clinic Note      HPI:   Ms. Dalton Kumar is a pleasant 60 year old female with medical history pertinent for dyslipidemia and hypertriglyceridemia, DM2, HTN, CKD III, bipolar I disorder, and Stargart disease. She presents to cardiology clinic for follow up.    Dalton was most recently seen in cardiology clinic in July 2024 by Dr. Vick. At that visit, fenofibrate was increased and Lovaza was ordered.     Today Dalton denies chest pain, palpitations, dizziness, syncope, or lower extremity edema. She has been eating more homemade food made by her daughter (lower oil, lower fat); cut out soda and carbs.   She not been taking Lovaza at night consistently because she takes it on an empty stomach and it makes her queasy.       PAST MEDICAL HISTORY:  Past Medical History:   Diagnosis Date     Bipolar 1 disorder (H)      Chronic kidney disease      Diabetes mellitus, type 2 (H) 07/09/2024     Hyperlipidemia      Hypertension      Hypothyroidism (acquired)      Lithium toxicity      Retinitis pigmentosa      Subdural hematoma (H)        FAMILY HISTORY:  Family History   Problem Relation Age of Onset     Diabetes Mother      Coronary Artery Disease Father      Coronary Artery Disease Early Onset Father      No Known Problems Brother      No Known Problems Brother      No Known Problems Brother      No Known Problems Brother      No Known Problems Brother      Cirrhosis Sister      Hyperlipidemia Daughter      No Known Problems Daughter      No Known Problems Daughter      No Known Problems Daughter      Emphysema Paternal Aunt   Will route to provider to advise on taper down instructions, these tablets cannot be split in half. Would you like him to take every other day instead? Or would you order a lower dose tablet?     Lydia Shah RN         Glaucoma No family hx of      Macular Degeneration No family hx of        SOCIAL HISTORY:  Social History     Socioeconomic History     Marital status:    Tobacco Use     Smoking status: Former     Current packs/day: 0.00     Average packs/day: 0.5 packs/day for 7.0 years (3.5 ttl pk-yrs)     Types: Cigarettes     Start date: 1994     Quit date: 2001     Years since quittin.8     Smokeless tobacco: Never     Tobacco comments:     Quit    Vaping Use     Vaping status: Never Used   Substance and Sexual Activity     Alcohol use: Yes     Comment: 1 beer     Drug use: Never     Sexual activity: Yes     Partners: Male     Social Determinants of Health     Financial Resource Strain: Unknown (1/10/2024)    Financial Resource Strain      Within the past 12 months, have you or your family members you live with been unable to get utilities (heat, electricity) when it was really needed?: Patient declined   Food Insecurity: Low Risk  (1/10/2024)    Food Insecurity      Within the past 12 months, did you worry that your food would run out before you got money to buy more?: No      Within the past 12 months, did the food you bought just not last and you didn t have money to get more?: Patient declined   Transportation Needs: Unknown (1/10/2024)    Transportation Needs      Within the past 12 months, has lack of transportation kept you from medical appointments, getting your medicines, non-medical meetings or appointments, work, or from getting things that you need?: Patient declined   Interpersonal Safety: Low Risk  (2024)    Interpersonal Safety      Do you feel physically and emotionally safe where you currently live?: Yes      Within the past 12 months, have you been hit, slapped, kicked or otherwise physically hurt by someone?: No      Within the past 12 months, have you been humiliated or emotionally abused in other ways by your partner or ex-partner?: No   Housing Stability: Unknown  (1/10/2024)    Housing Stability      Do you have housing? : Patient declined      Are you worried about losing your housing?: Patient declined       CURRENT MEDICATIONS:  Current Outpatient Medications   Medication Sig Dispense Refill     acetaminophen (TYLENOL) 500 MG tablet Take 1,000 mg by mouth       albuterol (PROAIR HFA/PROVENTIL HFA/VENTOLIN HFA) 108 (90 Base) MCG/ACT inhaler INHALE 2 PUFFS BY MOUTH EVERY 6 HOURS AS NEEDED FOR SHORTNESS OF BREATH OR WHEEZING 54 g 11     atorvastatin (LIPITOR) 80 MG tablet TAKE 1 TABLET(80 MG) BY MOUTH DAILY 90 tablet 3     buPROPion (WELLBUTRIN XL) 150 MG 24 hr tablet Take 150 mg by mouth       Cholecalciferol (VITAMIN D) 125 MCG (5000 UT) capsule Take 1 capsule (5,000 Units) by mouth daily. 90 capsule 3     clobetasol propionate (TEMOVATE) 0.05 % external cream Apply a pea size amount for both hands twice a day for a week, then once daily for a week, and then stop. 15 g 0     desonide (DESOWEN) 0.05 % cream Apply topically 2 times daily       DiphenhydrAMINE HCl (BENADRYL PO) Take 25 mg by mouth daily as needed       divalproex sodium delayed-release (DEPAKOTE) 500 MG DR tablet Take 500 mg by mouth 2 times daily       fenofibrate (TRICOR) 145 MG tablet Take 1 tablet (145 mg) by mouth daily. 30 tablet 0     fluticasone (FLOVENT DISKUS) 250 MCG/ACT inhaler Inhale 1 puff into the lungs every 12 hours 60 each 4     fluticasone (FLOVENT HFA) 220 MCG/ACT inhaler Inhale 1 puff into the lungs 2 times daily 12 g 11     levothyroxine (SYNTHROID/LEVOTHROID) 175 MCG tablet Take 1 tablet (175 mcg) by mouth daily 90 tablet 1     lisinopril (ZESTRIL) 2.5 MG tablet Take 1 tablet (2.5 mg) by mouth daily 90 tablet 3     metFORMIN (GLUCOPHAGE XR) 500 MG 24 hr tablet Week 1 take 1 tablet by mouth with breakfast Week 2 take 1 tablet by mouth with breakfast and 1 tablet by mouth with dinner Week 3 Take 2 tablets with breakfast and 1 tablet with dinner Week 4 Take 2 tablets with breakfast and 2  tablets with dinner 360 tablet 1     omega-3 acid ethyl esters (LOVAZA) 1 g capsule Take 2 capsules (2 g) by mouth 2 times daily 30 capsule 3     paliperidone ER (INVEGA) 6 MG 24 hr tablet Take 6 mg by mouth       pimecrolimus (ELIDEL) 1 % cream        Skin Protectants, Misc. (EUCERIN) cream        topiramate (TOPAMAX) 100 MG tablet Take 100 mg by mouth       No current facility-administered medications for this visit.       ROS:   Refer to HPI    EXAM:  /78 (BP Location: Right arm, Patient Position: Chair, Cuff Size: Adult Large)   Pulse 86   Wt 81.9 kg (180 lb 8 oz)   SpO2 96%   BMI 31.72 kg/m    GENERAL: Appears comfortable, in no acute distress.   CV: RRR, +S1S2, no murmur, rub, or gallop  RESPIRATORY: Respirations regular, even, and unlabored. Lungs CTA throughout.   GI: Soft and non distended with normoactive bowel sounds present in all quadrants. No tenderness, rebound, guarding.   EXTREMITIES: no peripheral edema. 2+ bilateral pedal pulses.   NEUROLOGIC: Alert and oriented x 3. No focal deficits.   MUSCULOSKELETAL: No joint swelling or tenderness.   SKIN: No jaundice. No rashes or lesions.     Labs, reviewed with patient in clinic today:  CBC RESULTS:  Lab Results   Component Value Date    WBC 8.7 03/23/2024    WBC 9.7 05/07/2021    RBC 3.76 (L) 03/23/2024    RBC 4.26 05/07/2021    HGB 12.2 03/23/2024    HGB 13.3 05/07/2021    HCT 37.2 03/23/2024    HCT 40.6 05/07/2021    MCV 99 03/23/2024    MCV 95 05/07/2021    MCH 32.4 03/23/2024    MCH 31.2 05/07/2021    MCHC 32.8 03/23/2024    MCHC 32.8 05/07/2021    RDW 14.8 03/23/2024    RDW 13.3 05/07/2021     03/23/2024     05/07/2021       CMP RESULTS:  Lab Results   Component Value Date     03/23/2024     05/07/2021    POTASSIUM 4.8 03/23/2024    POTASSIUM 4.4 08/10/2022    POTASSIUM 4.2 05/07/2021    CHLORIDE 102 03/23/2024    CHLORIDE 106 08/10/2022    CHLORIDE 107 05/07/2021    CO2 22 03/23/2024    CO2 19 (L) 08/10/2022     "CO2 17 (L) 05/07/2021    ANIONGAP 13 03/23/2024    ANIONGAP 13 08/10/2022    ANIONGAP 11 05/07/2021     (H) 03/23/2024     (H) 08/10/2022     (H) 05/07/2021    BUN 25.0 (H) 03/23/2024    BUN 30 08/10/2022    BUN 27 05/07/2021    CR 1.50 (H) 03/23/2024    CR 0.98 05/07/2021    GFRESTIMATED 39 (L) 03/23/2024    GFRESTIMATED 63 05/07/2021    GFRESTBLACK 73 05/07/2021    RICK 10.5 (H) 03/23/2024    RICK 9.6 05/07/2021    BILITOTAL 0.3 07/16/2024    BILITOTAL 0.2 05/07/2021    ALBUMIN 4.5 07/16/2024    ALBUMIN 4.3 08/10/2022    ALBUMIN 4.3 05/07/2021    ALKPHOS 79 07/16/2024    ALKPHOS 65 05/07/2021    ALT 25 07/16/2024    ALT 38 05/07/2021    AST 29 07/16/2024    AST 23 05/07/2021        INR RESULTS:  No results found for: \"INR\"    Lab Results   Component Value Date    MAG 2.3 01/06/2009     No results found for: \"NTBNPI\"  No results found for: \"NTBNP\"    LIPIDS:  Recent Labs   Lab Test 12/02/24  1359 07/16/24  1003   CHOL 203* 244*   HDL 10* 9*   LDL 71 76   TRIG 420* 823*       Assessment and Plan:   Ms. Kumar is a 60 year old female with a PMH of dyslipidemia and hypertriglyceridemia, DM2, HTN, CKD III, bipolar I disorder, and Stargart disease.    # Dyslipidemia  # Hypertriglyceridemia  Decrease in triglycerides from 823->420 after initiation of Lovaza (not taking BID consistently).   - Recommend eating small snack when taking evening Lovaza, or taking at dinnertime, to prevent queasiness  - repeat fasting lipid panel in 3 months     # CKD stage III  GFR 39-52    Follow up:  1 year  Chart review time today: 10 minutes  Visit time today: 13 minutes  Total time spent today: 23 minutes      Nella Pamela, CNP  General Cardiology   12/09/24        Please do not hesitate to contact me if you have any questions/concerns.     Sincerely,     NASIM MCCLELLAND CNP  "

## 2025-04-20 DIAGNOSIS — F41.9 ANXIETY: ICD-10-CM

## 2025-04-21 RX ORDER — FLUOXETINE HYDROCHLORIDE 40 MG/1
CAPSULE ORAL
Qty: 90 CAPSULE | Refills: 0 | Status: SHIPPED | OUTPATIENT
Start: 2025-04-21

## 2025-05-03 SDOH — HEALTH STABILITY: PHYSICAL HEALTH: ON AVERAGE, HOW MANY MINUTES DO YOU ENGAGE IN EXERCISE AT THIS LEVEL?: 30 MIN

## 2025-05-03 SDOH — HEALTH STABILITY: PHYSICAL HEALTH: ON AVERAGE, HOW MANY DAYS PER WEEK DO YOU ENGAGE IN MODERATE TO STRENUOUS EXERCISE (LIKE A BRISK WALK)?: 5 DAYS

## 2025-05-03 ASSESSMENT — SOCIAL DETERMINANTS OF HEALTH (SDOH): HOW OFTEN DO YOU GET TOGETHER WITH FRIENDS OR RELATIVES?: MORE THAN THREE TIMES A WEEK

## 2025-05-07 ASSESSMENT — PATIENT HEALTH QUESTIONNAIRE - PHQ9
SUM OF ALL RESPONSES TO PHQ QUESTIONS 1-9: 3
10. IF YOU CHECKED OFF ANY PROBLEMS, HOW DIFFICULT HAVE THESE PROBLEMS MADE IT FOR YOU TO DO YOUR WORK, TAKE CARE OF THINGS AT HOME, OR GET ALONG WITH OTHER PEOPLE: NOT DIFFICULT AT ALL
SUM OF ALL RESPONSES TO PHQ QUESTIONS 1-9: 3

## 2025-05-08 ENCOUNTER — OFFICE VISIT (OUTPATIENT)
Dept: FAMILY MEDICINE | Facility: CLINIC | Age: 65
End: 2025-05-08
Attending: FAMILY MEDICINE
Payer: COMMERCIAL

## 2025-05-08 VITALS
OXYGEN SATURATION: 98 % | SYSTOLIC BLOOD PRESSURE: 130 MMHG | RESPIRATION RATE: 20 BRPM | TEMPERATURE: 98.3 F | WEIGHT: 149.2 LBS | HEART RATE: 84 BPM | BODY MASS INDEX: 29.29 KG/M2 | DIASTOLIC BLOOD PRESSURE: 74 MMHG | HEIGHT: 60 IN

## 2025-05-08 DIAGNOSIS — F41.9 ANXIETY: ICD-10-CM

## 2025-05-08 DIAGNOSIS — Z00.00 ENCOUNTER FOR ANNUAL WELLNESS EXAM IN MEDICARE PATIENT: Primary | ICD-10-CM

## 2025-05-08 DIAGNOSIS — Z13.1 SCREENING FOR DIABETES MELLITUS: ICD-10-CM

## 2025-05-08 DIAGNOSIS — Z13.220 LIPID SCREENING: ICD-10-CM

## 2025-05-08 DIAGNOSIS — Z12.31 ENCOUNTER FOR SCREENING MAMMOGRAM FOR BREAST CANCER: ICD-10-CM

## 2025-05-08 DIAGNOSIS — F33.1 MAJOR DEPRESSIVE DISORDER, RECURRENT EPISODE, MODERATE (H): ICD-10-CM

## 2025-05-08 RX ORDER — FLUOXETINE HYDROCHLORIDE 40 MG/1
CAPSULE ORAL
Qty: 90 CAPSULE | Refills: 2 | Status: SHIPPED | OUTPATIENT
Start: 2025-05-08

## 2025-05-08 RX ORDER — BUSPIRONE HYDROCHLORIDE 5 MG/1
TABLET ORAL
Qty: 270 TABLET | Refills: 3 | Status: SHIPPED | OUTPATIENT
Start: 2025-05-08

## 2025-05-08 RX ORDER — CETIRIZINE HYDROCHLORIDE 10 MG/1
10 TABLET ORAL DAILY
COMMUNITY
Start: 2025-05-08

## 2025-05-08 ASSESSMENT — PAIN SCALES - GENERAL: PAINLEVEL_OUTOF10: NO PAIN (0)

## 2025-05-08 ASSESSMENT — ANXIETY QUESTIONNAIRES
1. FEELING NERVOUS, ANXIOUS, OR ON EDGE: NOT AT ALL
GAD7 TOTAL SCORE: 0
GAD7 TOTAL SCORE: 0
8. IF YOU CHECKED OFF ANY PROBLEMS, HOW DIFFICULT HAVE THESE MADE IT FOR YOU TO DO YOUR WORK, TAKE CARE OF THINGS AT HOME, OR GET ALONG WITH OTHER PEOPLE?: NOT DIFFICULT AT ALL
4. TROUBLE RELAXING: NOT AT ALL
3. WORRYING TOO MUCH ABOUT DIFFERENT THINGS: NOT AT ALL
GAD7 TOTAL SCORE: 0
7. FEELING AFRAID AS IF SOMETHING AWFUL MIGHT HAPPEN: NOT AT ALL
IF YOU CHECKED OFF ANY PROBLEMS ON THIS QUESTIONNAIRE, HOW DIFFICULT HAVE THESE PROBLEMS MADE IT FOR YOU TO DO YOUR WORK, TAKE CARE OF THINGS AT HOME, OR GET ALONG WITH OTHER PEOPLE: NOT DIFFICULT AT ALL
7. FEELING AFRAID AS IF SOMETHING AWFUL MIGHT HAPPEN: NOT AT ALL
5. BEING SO RESTLESS THAT IT IS HARD TO SIT STILL: NOT AT ALL
2. NOT BEING ABLE TO STOP OR CONTROL WORRYING: NOT AT ALL
6. BECOMING EASILY ANNOYED OR IRRITABLE: NOT AT ALL

## 2025-05-08 NOTE — PROGRESS NOTES
Preventive Care Visit  [facility]  Nona Torres DO, Family Medicine  May 8, 2025      Assessment & Plan     Encounter for annual wellness exam in Medicare patient      Major depressive disorder, recurrent episode, moderate (H)  The current medical regimen is effective;  continue present plan and medications.    Anxiety  The current medical regimen is effective;  continue present plan and medications.    - busPIRone (BUSPAR) 5 MG tablet; TAKE 2 TABLETS BY MOUTH EVERY MORNING AND 1 TABLET EVERY EVENING  - FLUoxetine (PROZAC) 40 MG capsule; TAKE 1 CAPSULE(40 MG) BY MOUTH DAILY.    Lipid screening  Return fastin  - Lipid panel reflex to direct LDL Fasting; Future    Screening for diabetes mellitus    - Basic metabolic panel  (Ca, Cl, CO2, Creat, Gluc, K, Na, BUN); Future    Encounter for screening mammogram for breast cancer    - MA Screen Bilateral w/[place]; Future            BMI  Estimated body mass index is 29.14 kg/m  as calculated from the following:    Height as of this encounter: 1.524 m (5').    Weight as of this encounter: 67.7 kg (149 lb 3.2 oz).   Weight management plan: Discussed healthy diet and exercise guidelines    Counseling  Appropriate preventive services were addressed with this patient via screening, questionnaire, or discussion as appropriate for fall prevention, nutrition, physical activity, Tobacco-use cessation, social engagement, weight loss and cognition.  Checklist reviewing preventive services available has been given to the patient.  Reviewed patient's diet, addressing concerns and/or questions.   Discussed possible causes of fatigue. Information on urinary incontinence and treatment options given to patient.       Follow-up   No follow-ups on file.        Dawit Nicholson is a 65 year old, presenting for the following:  Physical        5/8/2025     8:37 AM   Additional Questions   Roomed by Millie MENDOZA   Accompanied by self         5/8/2025     8:37 AM   Patient  Reported Additional Medications   Patient reports taking the following new medications none           HPI     Depression and Anxiety   How are you doing with your depression since your last visit? No change  How are you doing with your anxiety since your last visit?  No change  Are you having other symptoms that might be associated with depression or anxiety? No  Have you had a significant life event? No   Do you have any concerns with your use of alcohol or other drugs? No  She is doing great on buspar and prozac     Social History     Tobacco Use    Smoking status: Former     Current packs/day: 0.00     Average packs/day: 2.0 packs/day for 13.1 years (26.1 ttl pk-yrs)     Types: Cigarettes     Start date: 1971     Quit date: 1984     Years since quittin.9     Passive exposure: Never    Smokeless tobacco: Never   Vaping Use    Vaping status: Never Used   Substance Use Topics    Alcohol use: Yes     Comment: 3-5 per week    Drug use: No         3/16/2024     6:58 AM 2024     1:01 PM 2025    10:39 AM   PHQ   PHQ-9 Total Score 0    0 0 3    Q9: Thoughts of better off dead/self-harm past 2 weeks Not at all Not at all  Not at all       Patient-reported    Proxy-reported         3/16/2024     6:59 AM 2024     9:11 AM 2025     8:26 AM   LEONOR-7 SCORE   Total Score 1 (minimal anxiety) 1 (minimal anxiety) 0 (minimal anxiety)   Total Score 1    1 1 0        Patient-reported         2025    10:39 AM   Last PHQ-9   1.  Little interest or pleasure in doing things 0   2.  Feeling down, depressed, or hopeless 0   3.  Trouble falling or staying asleep, or sleeping too much 1   4.  Feeling tired or having little energy 1   5.  Poor appetite or overeating 1   6.  Feeling bad about yourself 0   7.  Trouble concentrating 0   8.  Moving slowly or restless 0   Q9: Thoughts of better off dead/self-harm past 2 weeks 0   PHQ-9 Total Score 3        Patient-reported         2025     8:26 AM   LEONOR-7    1.  Feeling nervous, anxious, or on edge 0   2. Not being able to stop or control worrying 0   3. Worrying too much about different things 0   4. Trouble relaxing 0   5. Being so restless that it is hard to sit still 0   6. Becoming easily annoyed or irritable 0   7. Feeling afraid, as if something awful might happen 0   LEONOR-7 Total Score 0    If you checked any problems, how difficult have they made it for you to do your work, take care of things at home, or get along with other people? Not difficult at all       Patient-reported       Suicide Assessment Five-step Evaluation and Treatment (SAFE-T)        Advance Care Planning    Discussed advance care planning with patient; informed AVS has link to [facility].        5/3/2025   General Health   How would you rate your overall physical health? Good   Feel stress (tense, anxious, or unable to sleep) Only a little   (!) STRESS CONCERN      5/3/2025   Nutrition   Diet: Regular (no restrictions)         5/3/2025   Exercise   Days per week of moderate/strenous exercise 5 days   Average minutes spent exercising at this level 30 min         5/3/2025   Social Factors   Frequency of gathering with friends or relatives More than three times a week   Worry food won't last until get money to buy more No   Food not last or not have enough money for food? No   Do you have housing? (Housing is defined as stable permanent housing and does not include staying outside in a car, in a tent, in an abandoned building, in an overnight shelter, or couch-surfing.) Yes   Are you worried about losing your housing? No   Lack of transportation? No   Unable to get utilities (heat,electricity)? No         5/8/2025   Fall Risk   Reason Gait Speed Test Not Completed Patient declines   Reason for decline just clumsy          5/3/2025   Activities of Daily Living- Home Safety   Needs help with the following daily activites None of the above   Safety concerns in the home None of the above          5/3/2025   Dental   Dentist two times every year? Yes         5/3/2025   Hearing Screening   Hearing concerns? None of the above         5/3/2025   Driving Risk Screening   Patient/family members have concerns about driving No         5/3/2025   General Alertness/Fatigue Screening   Have you been more tired than usual lately? (!) YES         5/3/2025   Urinary Incontinence Screening   Bothered by leaking urine in past 6 months Yes       Today's PHQ-9 Score:       2025    10:39 AM   PHQ-9 SCORE   PHQ-9 Total Score MyChart 3 (Minimal depression)   PHQ-9 Total Score 3        Patient-reported         5/3/2025   Substance Use   Alcohol more than 3/day or more than 7/wk No   Do you have a current opioid prescription? No   How severe/bad is pain from 1 to 10? 0/10 (No Pain)   Do you use any other substances recreationally? No     Social History     Tobacco Use    Smoking status: Former     Current packs/day: 0.00     Average packs/day: 2.0 packs/day for 13.1 years (26.1 ttl pk-yrs)     Types: Cigarettes     Start date: 1971     Quit date: 1984     Years since quittin.9     Passive exposure: Never    Smokeless tobacco: Never   Vaping Use    Vaping status: Never Used   Substance Use Topics    Alcohol use: Yes     Comment: 3-5 per week    Drug use: No           2024   LAST FHS-7 RESULTS   1st degree relative breast or ovarian cancer No   Any relative bilateral breast cancer No   Any male have breast cancer No   Any ONE woman have BOTH breast AND ovarian cancer No   Any woman with breast cancer before 50yrs No   2 or more relatives with breast AND/OR ovarian cancer No   2 or more relatives with breast AND/OR bowel cancer No        Mammogram Screening - Mammogram every 1-2 years updated in Health Maintenance based on mutual decision making      History of abnormal Pap smear: No - age 65 or older with adequate negative prior screening test results (3 consecutive negative cytology results, 2 consecutive  negative cotesting results, or 2 consecutive negative HrHPV test results within 10 years, with the most recent test occurring within the recommended screening interval for the test used)        Latest Ref Rng & Units 1/30/2019     2:40 PM 1/30/2019     2:29 PM 3/26/2014    12:00 AM   PAP / HPV   PAP (Historical)   NIL  NIL    HPV 16 DNA NEG^Negative Negative      HPV 18 DNA NEG^Negative Negative      Other HR HPV NEG^Negative Negative        ASCVD Risk   The ASCVD Risk score (Viridiana DK, et al., 2019) failed to calculate for the following reasons:    The valid HDL cholesterol range is 20 to 100 mg/dL            Reviewed and updated as needed this visit by Provider                      Current providers sharing in care for this patient include:  Patient Care Team:  Nona Torres DO as PCP - General (Family Medicine)  John Tran LICSW as Other (see comments) ( - Clinical)  Lexus Pandey MD as MD (Ophthalmology)  Nona Torres DO as Assigned PCP  Richelle Esteves AuD as Audiologist (Audiology)    The following health maintenance items are reviewed in Epic and correct as of today:  Health Maintenance   Topic Date Due    MEDICARE ANNUAL WELLNESS VISIT  04/08/2025    ANNUAL REVIEW OF HM ORDERS  04/08/2025    PHQ-9  11/08/2025    EYE EXAM  01/02/2026    LEONOR ASSESSMENT  05/08/2026    FALL RISK ASSESSMENT  05/08/2026    MAMMO SCREENING  06/21/2026    DIABETES SCREENING  04/08/2027    LIPID  04/08/2029    ADVANCE CARE PLANNING  04/08/2029    COLORECTAL CANCER SCREENING  02/28/2032    DTAP/TDAP/TD IMMUNIZATION (3 - Td or Tdap) 06/25/2034    DEXA  12/02/2039    HEPATITIS C SCREENING  Completed    DEPRESSION ACTION PLAN  Completed    INFLUENZA VACCINE  Completed    Pneumococcal Vaccine: 50+ Years  Completed    ZOSTER IMMUNIZATION  Completed    RSV VACCINE  Completed    COVID-19 Vaccine  Completed    HPV IMMUNIZATION  Aged Out    MENINGITIS IMMUNIZATION  Aged Out    PAP  Discontinued          Review of Systems  Constitutional, HEENT, cardiovascular, pulmonary, gi and gu systems are negative, except as otherwise noted.     Objective    Exam  /74   Pulse 84   Temp 98.3  F (36.8  C) (Oral)   Resp 20   Ht 1.524 m (5')   Wt 67.7 kg (149 lb 3.2 oz)   LMP  (LMP Unknown)   SpO2 98%   BMI 29.14 kg/m     Estimated body mass index is 29.14 kg/m  as calculated from the following:    Height as of this encounter: 1.524 m (5').    Weight as of this encounter: 67.7 kg (149 lb 3.2 oz).    Physical Exam  GENERAL: alert and no distress  EYES: Eyes grossly normal to inspection, PERRL and conjunctivae and sclerae normal  HENT: ear canals and TM's normal, nose and mouth without ulcers or lesions  NECK: no adenopathy, no asymmetry, masses, or scars  RESP: lungs clear to auscultation - no rales, rhonchi or wheezes  CV: regular rate and rhythm, normal S1 S2, no S3 or S4, no murmur, click or rub, no peripheral edema  ABDOMEN: soft, nontender, no hepatosplenomegaly, no masses and bowel sounds normal  MS: no gross musculoskeletal defects noted, no edema  SKIN: no suspicious lesions or rashes  NEURO: Normal strength and tone, mentation intact and speech normal  PSYCH: mentation appears normal, affect normal/bright         5/8/2025   Mini Cog   Clock Draw Score 2 Normal   3 Item Recall 3 objects recalled   Mini Cog Total Score 5             4/8/2024   Vision Screen   Patient wears corrective lenses (select all that apply) Worn during vision screen   Vision Screen Results Pass       Signed Electronically by: Nona Torres DO

## 2025-05-08 NOTE — PATIENT INSTRUCTIONS
Calcium 600 mg twice a day in food or supplement       Patient Education   Preventive Care Advice   This is general advice given by our system to help you stay healthy. However, your care team may have specific advice just for you. Please talk to your care team about your preventive care needs.  Nutrition  Eat 5 or more servings of fruits and vegetables each day.  Try wheat bread, brown rice and whole grain pasta (instead of white bread, rice, and pasta).  Get enough calcium and vitamin D. Check the label on foods and aim for 100% of the RDA (recommended daily allowance).  Lifestyle  Exercise at least 150 minutes each week  (30 minutes a day, 5 days a week).  Do muscle strengthening activities 2 days a week. These help control your weight and prevent disease.  No smoking.  Wear sunscreen to prevent skin cancer.  Have a dental exam and cleaning every 6 months.  Yearly exams  See your health care team every year to talk about:  Any changes in your health.  Any medicines your care team has prescribed.  Preventive care, family planning, and ways to prevent chronic diseases.  Shots (vaccines)   HPV shots (up to age 26), if you've never had them before.  Hepatitis B shots (up to age 59), if you've never had them before.  COVID-19 shot: Get this shot when it's due.  Flu shot: Get a flu shot every year.  Tetanus shot: Get a tetanus shot every 10 years.  Pneumococcal, hepatitis A, and RSV shots: Ask your care team if you need these based on your risk.  Shingles shot (for age 50 and up)  General health tests  Diabetes screening:  Starting at age 35, Get screened for diabetes at least every 3 years.  If you are younger than age 35, ask your care team if you should be screened for diabetes.  Cholesterol test: At age 39, start having a cholesterol test every 5 years, or more often if advised.  Bone density scan (DEXA): At age 50, ask your care team if you should have this scan for osteoporosis (brittle bones).  Hepatitis C: Get  tested at least once in your life.  STIs (sexually transmitted infections)  Before age 24: Ask your care team if you should be screened for STIs.  After age 24: Get screened for STIs if you're at risk. You are at risk for STIs (including HIV) if:  You are sexually active with more than one person.  You don't use condoms every time.  You or a partner was diagnosed with a sexually transmitted infection.  If you are at risk for HIV, ask about PrEP medicine to prevent HIV.  Get tested for HIV at least once in your life, whether you are at risk for HIV or not.  Cancer screening tests  Cervical cancer screening: If you have a cervix, begin getting regular cervical cancer screening tests starting at age 21.  Breast cancer scan (mammogram): If you've ever had breasts, begin having regular mammograms starting at age 40. This is a scan to check for breast cancer.  Colon cancer screening: It is important to start screening for colon cancer at age 45.  Have a colonoscopy test every 10 years (or more often if you're at risk) Or, ask your provider about stool tests like a FIT test every year or Cologuard test every 3 years.  To learn more about your testing options, visit:   .  For help making a decision, visit:   https://bit.ly/ml90917.  Prostate cancer screening test: If you have a prostate, ask your care team if a prostate cancer screening test (PSA) at age 55 is right for you.  Lung cancer screening: If you are a current or former smoker ages 50 to 80, ask your care team if ongoing lung cancer screenings are right for you.  For informational purposes only. Not to replace the advice of your health care provider. Copyright   2023 [facility]. All rights reserved. Clinically reviewed by the [facility] Transitions Program. [employer] 851504 - REV 01/24.   Learning About Risk for Heart Attack and Stroke (01:56)  Your health professional recommends that you watch this short online health video.  Learn what  raises your risk for having a heart attack or stroke and how you can lower your risk.   Purpose: Understand the risk of having a heart attack or stroke and what you can do about it.  Goal: Understand the risk of having a heart attack or stroke and what you can do about it.    Watch: Scan the QR code or visit the link to view video       https://hwi.se/[email or web address]  Current as of: July 31, 2024  Content Version: 14.4    5743-8304 [employer].   Care instructions adapted under license by your healthcare professional. If you have questions about a medical condition or this instruction, always ask your healthcare professional. [employer] disclaims any warranty or liability for your use of this information.

## 2025-05-14 ENCOUNTER — LAB (OUTPATIENT)
Dept: LAB | Facility: CLINIC | Age: 65
End: 2025-05-14
Payer: COMMERCIAL

## 2025-05-14 DIAGNOSIS — Z13.1 SCREENING FOR DIABETES MELLITUS: ICD-10-CM

## 2025-05-14 DIAGNOSIS — Z13.220 LIPID SCREENING: ICD-10-CM

## 2025-05-14 LAB
ANION GAP SERPL CALCULATED.3IONS-SCNC: 11 MMOL/L (ref 7–15)
BUN SERPL-MCNC: 18 MG/DL (ref 8–23)
CALCIUM SERPL-MCNC: 8.7 MG/DL (ref 8.8–10.4)
CHLORIDE SERPL-SCNC: 106 MMOL/L (ref 98–107)
CHOLEST SERPL-MCNC: 223 MG/DL
CREAT SERPL-MCNC: 0.82 MG/DL (ref 0.51–0.95)
EGFRCR SERPLBLD CKD-EPI 2021: 78 ML/MIN/1.73M2
FASTING STATUS PATIENT QL REPORTED: YES
FASTING STATUS PATIENT QL REPORTED: YES
GLUCOSE SERPL-MCNC: 91 MG/DL (ref 70–99)
HCO3 SERPL-SCNC: 23 MMOL/L (ref 22–29)
HDLC SERPL-MCNC: 93 MG/DL
LDLC SERPL CALC-MCNC: 108 MG/DL
NONHDLC SERPL-MCNC: 130 MG/DL
POTASSIUM SERPL-SCNC: 4.6 MMOL/L (ref 3.4–5.3)
SODIUM SERPL-SCNC: 140 MMOL/L (ref 135–145)
TRIGL SERPL-MCNC: 110 MG/DL

## 2025-05-14 PROCEDURE — 36415 COLL VENOUS BLD VENIPUNCTURE: CPT

## 2025-05-14 PROCEDURE — 80061 LIPID PANEL: CPT

## 2025-05-14 PROCEDURE — 80048 BASIC METABOLIC PNL TOTAL CA: CPT

## 2025-05-16 ENCOUNTER — RESULTS FOLLOW-UP (OUTPATIENT)
Dept: FAMILY MEDICINE | Facility: CLINIC | Age: 65
End: 2025-05-16

## 2025-05-16 DIAGNOSIS — R79.89 LOW SERUM CALCIUM: Primary | ICD-10-CM

## 2025-06-09 ENCOUNTER — MYC MEDICAL ADVICE (OUTPATIENT)
Dept: FAMILY MEDICINE | Facility: CLINIC | Age: 65
End: 2025-06-09
Payer: COMMERCIAL

## 2025-07-03 ENCOUNTER — ANCILLARY PROCEDURE (OUTPATIENT)
Dept: MAMMOGRAPHY | Facility: CLINIC | Age: 65
End: 2025-07-03
Attending: FAMILY MEDICINE
Payer: COMMERCIAL

## 2025-07-03 DIAGNOSIS — Z12.31 ENCOUNTER FOR SCREENING MAMMOGRAM FOR BREAST CANCER: ICD-10-CM

## 2025-07-03 PROCEDURE — 77063 BREAST TOMOSYNTHESIS BI: CPT | Mod: GC

## 2025-07-03 PROCEDURE — 77067 SCR MAMMO BI INCL CAD: CPT | Mod: GC

## 2025-07-16 ENCOUNTER — TRANSFERRED RECORDS (OUTPATIENT)
Dept: HEALTH INFORMATION MANAGEMENT | Facility: CLINIC | Age: 65
End: 2025-07-16

## (undated) DEVICE — RADIAL JAW 4

## (undated) RX ORDER — SIMETHICONE 40MG/0.6ML
SUSPENSION, DROPS(FINAL DOSAGE FORM)(ML) ORAL
Status: DISPENSED
Start: 2022-02-28

## (undated) RX ORDER — FENTANYL CITRATE 50 UG/ML
INJECTION, SOLUTION INTRAMUSCULAR; INTRAVENOUS
Status: DISPENSED
Start: 2022-02-28